# Patient Record
Sex: FEMALE | Race: WHITE | NOT HISPANIC OR LATINO | Employment: OTHER | URBAN - METROPOLITAN AREA
[De-identification: names, ages, dates, MRNs, and addresses within clinical notes are randomized per-mention and may not be internally consistent; named-entity substitution may affect disease eponyms.]

---

## 2017-03-24 ENCOUNTER — TELEPHONE (OUTPATIENT)
Dept: INTERNAL MEDICINE | Facility: CLINIC | Age: 67
End: 2017-03-24

## 2017-03-24 NOTE — TELEPHONE ENCOUNTER
----- Message from Caryn Dupree sent at 3/24/2017 10:12 AM EDT -----  Contact: pt - Dr Ramirez's pt - RE: lab test  Pt calling and would like a fecal test sent to pt. Pt informs would like to have fecal tested and have results available for appt in April. Could you please put order in pt's chart? Please advise. Thanks      Pt # [phone]

## 2017-04-17 ENCOUNTER — TELEPHONE (OUTPATIENT)
Dept: INTERNAL MEDICINE | Facility: CLINIC | Age: 67
End: 2017-04-17

## 2017-04-17 DIAGNOSIS — Z00.00 HEALTH CARE MAINTENANCE: Primary | ICD-10-CM

## 2017-04-17 NOTE — TELEPHONE ENCOUNTER
----- Message from Pennie Hartman sent at 4/17/2017  9:02 AM EDT -----  Contact: Patient  Patient called requesting lab appt prior to appt with Dr. Ramirez next week.  I have her scheduled for Thursday.  Need orders in chart please.

## 2017-04-20 ENCOUNTER — LAB (OUTPATIENT)
Dept: INTERNAL MEDICINE | Facility: CLINIC | Age: 67
End: 2017-04-20

## 2017-04-20 DIAGNOSIS — Z00.00 HEALTH CARE MAINTENANCE: ICD-10-CM

## 2017-04-20 LAB
ALBUMIN SERPL-MCNC: 4.32 G/DL (ref 3.4–4.6)
ALBUMIN/GLOB SERPL: 1.5 G/DL
ALP SERPL-CCNC: 47 U/L (ref 46–116)
ALT SERPL W P-5'-P-CCNC: 26 U/L (ref 14–59)
ANION GAP SERPL CALCULATED.3IONS-SCNC: 9 MMOL/L
AST SERPL-CCNC: 24 U/L (ref 7–37)
BASOPHILS # BLD AUTO: 0.06 10*3/MM3 (ref 0–0.2)
BASOPHILS NFR BLD AUTO: 0.9 % (ref 0–2)
BILIRUB SERPL-MCNC: 0.6 MG/DL (ref 0.2–1)
BILIRUB UR QL STRIP: NEGATIVE
BUN BLD-MCNC: 10 MG/DL (ref 6–22)
BUN/CREAT SERPL: 11.8 (ref 7–25)
CALCIUM SPEC-SCNC: 9.5 MG/DL (ref 8.6–10.5)
CHLORIDE SERPL-SCNC: 101 MMOL/L (ref 95–107)
CHOLEST SERPL-MCNC: 205 MG/DL (ref 0–200)
CLARITY UR: CLEAR
CO2 SERPL-SCNC: 27 MMOL/L (ref 23–32)
COLOR UR: YELLOW
CREAT BLD-MCNC: 0.85 MG/DL (ref 0.55–1.02)
DEPRECATED RDW RBC AUTO: 43 FL (ref 37–54)
EOSINOPHIL # BLD AUTO: 0.09 10*3/MM3 (ref 0–0.7)
EOSINOPHIL NFR BLD AUTO: 1.4 % (ref 0–5)
ERYTHROCYTE [DISTWIDTH] IN BLOOD BY AUTOMATED COUNT: 12.8 % (ref 11.5–15)
GFR SERPL CREATININE-BSD FRML MDRD: 67 ML/MIN/1.73
GLOBULIN UR ELPH-MCNC: 2.9 GM/DL
GLUCOSE BLD-MCNC: 85 MG/DL (ref 70–100)
GLUCOSE UR STRIP-MCNC: NEGATIVE MG/DL
HCT VFR BLD AUTO: 43.8 % (ref 34.1–44.9)
HDLC SERPL-MCNC: 82 MG/DL (ref 40–81)
HGB BLD-MCNC: 14.3 G/DL (ref 11.2–15.7)
HGB UR QL STRIP.AUTO: NEGATIVE
KETONES UR QL STRIP: ABNORMAL
LDLC SERPL CALC-MCNC: 114 MG/DL (ref 0–100)
LDLC/HDLC SERPL: 1.4 {RATIO}
LEUKOCYTE ESTERASE UR QL STRIP.AUTO: NEGATIVE
LYMPHOCYTES # BLD AUTO: 1.8 10*3/MM3 (ref 0.8–7)
LYMPHOCYTES NFR BLD AUTO: 27.5 % (ref 10–60)
MCH RBC QN AUTO: 30.4 PG (ref 26–34)
MCHC RBC AUTO-ENTMCNC: 32.6 G/DL (ref 31–37)
MCV RBC AUTO: 93.2 FL (ref 80–100)
MONOCYTES # BLD AUTO: 0.51 10*3/MM3 (ref 0–1)
MONOCYTES NFR BLD AUTO: 7.8 % (ref 0–13)
NEUTROPHILS # BLD AUTO: 4.08 10*3/MM3 (ref 1–11)
NEUTROPHILS NFR BLD AUTO: 62.4 % (ref 30–85)
NITRITE UR QL STRIP: NEGATIVE
PH UR STRIP.AUTO: 5.5 [PH] (ref 5–8)
PLATELET # BLD AUTO: 252 10*3/MM3 (ref 150–450)
PMV BLD AUTO: 10.6 FL (ref 6–12)
POTASSIUM BLD-SCNC: 4.6 MMOL/L (ref 3.3–5.3)
PROT SERPL-MCNC: 7.2 G/DL (ref 6.3–8.4)
PROT UR QL STRIP: NEGATIVE
RBC # BLD AUTO: 4.7 10*6/MM3 (ref 3.93–5.22)
SODIUM BLD-SCNC: 137 MMOL/L (ref 136–145)
SP GR UR STRIP: 1.01 (ref 1–1.03)
TRIGL SERPL-MCNC: 43 MG/DL (ref 0–150)
TSH SERPL DL<=0.05 MIU/L-ACNC: 0.97 MIU/ML (ref 0.4–4.2)
UROBILINOGEN UR QL STRIP: ABNORMAL
VLDLC SERPL-MCNC: 8.6 MG/DL
WBC NRBC COR # BLD: 6.54 10*3/MM3 (ref 5–10)

## 2017-04-20 PROCEDURE — 36415 COLL VENOUS BLD VENIPUNCTURE: CPT | Performed by: INTERNAL MEDICINE

## 2017-04-20 PROCEDURE — 81003 URINALYSIS AUTO W/O SCOPE: CPT | Performed by: INTERNAL MEDICINE

## 2017-04-20 PROCEDURE — 80050 GENERAL HEALTH PANEL: CPT | Performed by: INTERNAL MEDICINE

## 2017-04-20 PROCEDURE — 80061 LIPID PANEL: CPT | Performed by: INTERNAL MEDICINE

## 2017-04-25 ENCOUNTER — OFFICE VISIT (OUTPATIENT)
Dept: INTERNAL MEDICINE | Facility: CLINIC | Age: 67
End: 2017-04-25

## 2017-04-25 VITALS
DIASTOLIC BLOOD PRESSURE: 68 MMHG | SYSTOLIC BLOOD PRESSURE: 112 MMHG | BODY MASS INDEX: 28.17 KG/M2 | WEIGHT: 159 LBS | HEIGHT: 63 IN

## 2017-04-25 DIAGNOSIS — Z78.0 POST-MENOPAUSE: ICD-10-CM

## 2017-04-25 DIAGNOSIS — J30.1 SEASONAL ALLERGIC RHINITIS DUE TO POLLEN: ICD-10-CM

## 2017-04-25 DIAGNOSIS — Z00.00 HEALTH CARE MAINTENANCE: Primary | ICD-10-CM

## 2017-04-25 DIAGNOSIS — K64.4 EXTERNAL HEMORRHOID: ICD-10-CM

## 2017-04-25 PROCEDURE — 99397 PER PM REEVAL EST PAT 65+ YR: CPT | Performed by: INTERNAL MEDICINE

## 2017-04-25 PROCEDURE — 99213 OFFICE O/P EST LOW 20 MIN: CPT | Performed by: INTERNAL MEDICINE

## 2017-04-25 PROCEDURE — G0439 PPPS, SUBSEQ VISIT: HCPCS | Performed by: INTERNAL MEDICINE

## 2017-04-25 RX ORDER — FLUTICASONE PROPIONATE 50 MCG
2 SPRAY, SUSPENSION (ML) NASAL DAILY
Qty: 1 EACH | Refills: 11 | Status: SHIPPED | OUTPATIENT
Start: 2017-04-25 | End: 2018-06-09 | Stop reason: SDUPTHER

## 2017-04-25 NOTE — PATIENT INSTRUCTIONS
Annual wellness visit with preventive exam as well as age and risk appropriate councelling completed.    Cardiovascular disease councelling: current.  Diabetes screening and councelling: current.  Breast cancer screening: up to date. Recommended every year..  Osteoporosis screening: is due, will schedule. Benefits explained..  Glaucoma screening: up to date.   AAA screening: not needed..  Hep C screening (born between [birth year]) completed..  Colorectal cancer screening: up to date. Recommended every 10 years. Next screening is due in 2024..    Immunizations:   1. Influenza vaccine discussed and recommended, but patient declines.   2. Pneumococcal vaccines: completed.   3. Zostavax: completed.      Advanced directives planning: has Living Will. Discussed. I agree with patient wishes and decision..    Medications reviewed and medication list updated.   Potential harmful drug-disease interactions in the elderly:none.  High risk medication in elderly: none  ASA use: no indications.    Seasonal allergic sinusitis - continue Zyrtec and add nasal spray.  Hemorrhoids external - reassured. Use Preparation H as needed OTC.

## 2017-04-25 NOTE — PROGRESS NOTES
"Subjective   Verito العراقي is a 67 y.o. female. Here for AWV    History of Present Illness   /68  Ht 63\" (160 cm)  Wt 159 lb (72.1 kg)  BMI 28.17 kg/m2  Patient is being seen for subsequent wellness visit.  Hospitalizations in a past: only for childbirth  Once per lifetime Medicare screening tests: ECG - 02/12/2016    AAA risk assessment: 1. FH: none. 2.H/o tobacco smoking: none. 3. CV or PAD: none.    Tobacco use: none   Alcohol use: occasionally  Illicit drugs use: none  Diet: healthy heart  Exercise: 5-6 days a week and walking    Anxiety screening: How many days in the last 2 weeks you were  1. Feeling anxious, nervous, on edge: none  2. Unable to stop worrying: none  3. Worrying too much about different things: none  4. Having problems relaxing:none  5. Feeling restless or unable to sit still:none  6. Feeling irritable or easely annoyed: none  7. Being afraid that something awful might happen: none    Depression screening PHQ9:  Over the past 2 weeks how often have you been bothered by  1. Lack of interest or pleasuer in usual activities: none  2. Feeling down, depressed, hopeless:none  3. Troubles falling asleep/sleeping too long:none  4. Feeling tired, having little energy: none  5. Poor appetite or overeating: none  6. Feeling bad about yourself:none.  7. Trouble concentrating: at the baseline.  8. Moving or speaking too slow or much faster than usual: none  9. Thoughts about harming yourself:none.    Cognitive impairment screening:   Do you have difficulties with:  1. Language: no  2. Behavior: no  3. Learning/retaining new information: no  4. Handling complex tasks: no  5. Reasoning: no  6. Spacial ability and orientation: no    Hearing: normal.  Driving: unrestricted  ADL: independent  ADL details: Does patient needs help with:  telephone use: no  Transportation: no  Housework: no  Shopping: no  meal preparation: no  laundry: no  managing medication:no  Participate in the social activities: " "Y    Fall risk factors:   1.Use of alcohol: yes    2.Polypharmacy: no  3.H/o of previous fall:  no  4. Mobility impairment:  no  5. Visual impairment:  no  6. Deconditioning: no  7. Use of antihypertensive medication:  no  8. Use of sedatives: no  9. Use of antidepressant: no    Home safety:   1.loose rugs: no   2.unfamiliar environment: no   3. Uneven floors: no   4. No grab bars in the bathroom:  Yes. Recommended to update the bathroom and to instal the grab bar.  5. No banister on stairs: no      Advanced directives: has Living Will. Discussed. I agree with patient wishes and decision..    Co-managers: ophthalmology , GYN , dermatologist , GI .   Patient c/o seasonal allergies: runny nose usually in the spring. It had been bothering her some now. Not well controlled with Zyrtec OTC. Run out of the refills for hr Flonase and had not been using any nasal sprays lately.   Patient is concerned that she has some rectal \"protrusion\". Has h/o hemorrhoids post-partum , but had no issues since. She denies any pain, itching or blood in the stool or on the toilet paper. No constipation. Had been walking more recently (6 miles today), also does some yoga and plays golf.She had approached her GYN with this question, but had not been satisfied with her reassurance.    The following portions of the patient's history were reviewed and updated as appropriate: allergies, current medications, past family history, past medical history, past social history, past surgical history and problem list.    Review of Systems   Constitutional: Negative for chills and fever.   HENT: Negative for postnasal drip, sinus pressure and sore throat.    Eyes: Negative for pain and itching.   Respiratory: Negative for cough and chest tightness.    Cardiovascular: Negative for chest pain and leg swelling.   Gastrointestinal: Negative for abdominal pain and blood in stool.   Endocrine: Negative for cold intolerance and heat " intolerance.   Genitourinary: Negative for difficulty urinating and flank pain.   Musculoskeletal: Negative for back pain and neck pain.   Skin: Negative for color change and rash.   Allergic/Immunologic: Positive for environmental allergies.   Neurological: Negative for dizziness, weakness and headaches.   Hematological: Negative for adenopathy. Does not bruise/bleed easily.   Psychiatric/Behavioral: Negative for sleep disturbance. The patient is not nervous/anxious.        Objective   Physical Exam   Constitutional: She is oriented to person, place, and time. Vital signs are normal. She appears well-developed and well-nourished. No distress.   HENT:   Head: Normocephalic and atraumatic.   Right Ear: External ear normal.   Left Ear: External ear normal.   Nose: Nose normal. No mucosal edema. Right sinus exhibits no maxillary sinus tenderness and no frontal sinus tenderness. Left sinus exhibits no maxillary sinus tenderness and no frontal sinus tenderness.   Mouth/Throat: Oropharynx is clear and moist. No oropharyngeal exudate.   Eyes: Conjunctivae, EOM and lids are normal. Pupils are equal, round, and reactive to light. Right eye exhibits no discharge. Left eye exhibits no discharge. Right conjunctiva is not injected. Left conjunctiva is not injected. No scleral icterus. Right eye exhibits normal extraocular motion. Left eye exhibits normal extraocular motion.   Neck: Normal range of motion and full passive range of motion without pain. Neck supple. No JVD present. Carotid bruit is not present. No thyromegaly present.   Cardiovascular: Normal rate, regular rhythm, S1 normal, S2 normal, normal heart sounds and intact distal pulses.  PMI is not displaced.  Exam reveals no gallop and no friction rub.    No murmur heard.  Pulmonary/Chest: Effort normal and breath sounds normal. No accessory muscle usage. No respiratory distress. She has no decreased breath sounds. She has no wheezes. She has no rhonchi. She has no  rales.   Abdominal: Soft. Bowel sounds are normal. She exhibits no distension, no pulsatile liver, no fluid wave, no abdominal bruit, no ascites and no mass. There is no tenderness. There is no rebound and no guarding.   Genitourinary: Rectal exam shows external hemorrhoid. Rectal exam shows no fissure, no mass, no tenderness and anal tone normal.   Musculoskeletal: She exhibits no edema or deformity.   Lymphadenopathy:        Head (right side): No submental, no submandibular, no preauricular, no posterior auricular and no occipital adenopathy present.        Head (left side): No submental, no submandibular, no tonsillar, no preauricular, no posterior auricular and no occipital adenopathy present.     She has no cervical adenopathy.        Right cervical: No superficial cervical, no deep cervical and no posterior cervical adenopathy present.       Left cervical: No superficial cervical, no deep cervical and no posterior cervical adenopathy present.        Right: No supraclavicular adenopathy present.        Left: No supraclavicular adenopathy present.   Neurological: She is alert and oriented to person, place, and time. She has normal strength and normal reflexes. She displays normal reflexes. No cranial nerve deficit. She exhibits normal muscle tone. Coordination normal.   Reflex Scores:       Bicep reflexes are 2+ on the right side and 2+ on the left side.       Patellar reflexes are 2+ on the right side and 2+ on the left side.  Skin: Skin is warm and dry. No rash noted. She is not diaphoretic. No erythema.   tanned   Psychiatric: She has a normal mood and affect. Her speech is normal and behavior is normal. Thought content normal.   Vitals reviewed.      Assessment/Plan   Verito was seen today for annual exam.    Diagnoses and all orders for this visit:    Health care maintenance    Seasonal allergic rhinitis due to pollen  -     fluticasone (FLONASE) 50 MCG/ACT nasal spray; 2 sprays into each nostril  Daily.    Annual wellness visit with preventive exam as well as age and risk appropriate councelling completed.    Cardiovascular disease councelling: current.  Diabetes screening and councelling: current.  Breast cancer screening: up to date. Recommended every year..  Osteoporosis screening: is due, will schedule. Benefits explained..  Glaucoma screening: up to date.   AAA screening: not needed..  Hep C screening (born between [birth year]) completed..  Colorectal cancer screening: up to date. Recommended every 10 years. Next screening is due in 2024..    Immunizations:   1. Influenza vaccine discussed and recommended, but patient declines.   2. Pneumococcal vaccines: completed.   3. Zostavax: completed.      Advanced directives planning: has Living Will. Discussed. I agree with patient wishes and decision..    Medications reviewed and medication list updated.   Potential harmful drug-disease interactions in the elderly:none.  High risk medication in elderly: none  ASA use: no indications.    Seasonal allergic sinusitis - continue Zyrtec and add nasal spray.  Hemorrhoids external - reassured. Use Preparation H as needed OTC.

## 2017-06-05 ENCOUNTER — CLINICAL SUPPORT (OUTPATIENT)
Dept: INTERNAL MEDICINE | Facility: CLINIC | Age: 67
End: 2017-06-05

## 2017-06-05 DIAGNOSIS — Z78.0 POST-MENOPAUSE: ICD-10-CM

## 2017-06-05 PROCEDURE — 77080 DXA BONE DENSITY AXIAL: CPT | Performed by: INTERNAL MEDICINE

## 2017-10-31 ENCOUNTER — AMBULATORY SURGICAL CENTER (AMBULATORY)
Dept: URBAN - METROPOLITAN AREA SURGERY 17 | Facility: SURGERY | Age: 67
End: 2017-10-31

## 2017-10-31 ENCOUNTER — OFFICE (AMBULATORY)
Dept: URBAN - METROPOLITAN AREA CLINIC 64 | Facility: CLINIC | Age: 67
End: 2017-10-31
Payer: MEDICARE

## 2017-10-31 VITALS
OXYGEN SATURATION: 99 % | RESPIRATION RATE: 16 BRPM | OXYGEN SATURATION: 95 % | DIASTOLIC BLOOD PRESSURE: 70 MMHG | TEMPERATURE: 98.2 F | SYSTOLIC BLOOD PRESSURE: 130 MMHG | HEART RATE: 65 BPM | DIASTOLIC BLOOD PRESSURE: 76 MMHG | SYSTOLIC BLOOD PRESSURE: 132 MMHG | HEART RATE: 69 BPM | DIASTOLIC BLOOD PRESSURE: 81 MMHG | OXYGEN SATURATION: 98 % | SYSTOLIC BLOOD PRESSURE: 121 MMHG | RESPIRATION RATE: 17 BRPM | RESPIRATION RATE: 21 BRPM | HEIGHT: 62 IN | HEART RATE: 79 BPM | OXYGEN SATURATION: 94 % | WEIGHT: 160 LBS | SYSTOLIC BLOOD PRESSURE: 131 MMHG | DIASTOLIC BLOOD PRESSURE: 66 MMHG | HEART RATE: 73 BPM | TEMPERATURE: 98 F | DIASTOLIC BLOOD PRESSURE: 71 MMHG | SYSTOLIC BLOOD PRESSURE: 117 MMHG | OXYGEN SATURATION: 96 % | DIASTOLIC BLOOD PRESSURE: 88 MMHG | HEART RATE: 68 BPM | HEART RATE: 80 BPM | SYSTOLIC BLOOD PRESSURE: 118 MMHG | SYSTOLIC BLOOD PRESSURE: 119 MMHG | HEART RATE: 75 BPM | DIASTOLIC BLOOD PRESSURE: 79 MMHG | OXYGEN SATURATION: 100 % | RESPIRATION RATE: 14 BRPM | RESPIRATION RATE: 18 BRPM | RESPIRATION RATE: 15 BRPM | HEART RATE: 70 BPM

## 2017-10-31 DIAGNOSIS — D12.2 BENIGN NEOPLASM OF ASCENDING COLON: ICD-10-CM

## 2017-10-31 DIAGNOSIS — K64.8 OTHER HEMORRHOIDS: ICD-10-CM

## 2017-10-31 DIAGNOSIS — K59.1 FUNCTIONAL DIARRHEA: ICD-10-CM

## 2017-10-31 LAB
GI HISTOLOGY: A. UNSPECIFIED: (no result)
GI HISTOLOGY: B. SELECT: (no result)
GI HISTOLOGY: C. UNSPECIFIED: (no result)
GI HISTOLOGY: D. SELECT: (no result)
GI HISTOLOGY: PDF REPORT: (no result)

## 2017-10-31 PROCEDURE — 88305 TISSUE EXAM BY PATHOLOGIST: CPT | Performed by: INTERNAL MEDICINE

## 2017-10-31 PROCEDURE — 45380 COLONOSCOPY AND BIOPSY: CPT | Mod: 59 | Performed by: INTERNAL MEDICINE

## 2017-10-31 PROCEDURE — 45385 COLONOSCOPY W/LESION REMOVAL: CPT | Performed by: INTERNAL MEDICINE

## 2017-10-31 RX ADMIN — PROPOFOL 100 MG: 10 INJECTION, EMULSION INTRAVENOUS at 13:36

## 2017-10-31 RX ADMIN — PROPOFOL 50 MG: 10 INJECTION, EMULSION INTRAVENOUS at 13:40

## 2017-10-31 RX ADMIN — PROPOFOL 100 MG: 10 INJECTION, EMULSION INTRAVENOUS at 13:34

## 2017-10-31 RX ADMIN — PROPOFOL 50 MG: 10 INJECTION, EMULSION INTRAVENOUS at 13:49

## 2017-10-31 NOTE — SERVICEHPINOTES
She has had diarrhea now for over 2 months. No obvious cause. No bleeding, no nocturnal sx. Family hx. negative, no one else ill. Stool studies negative.

## 2017-11-09 PROBLEM — D12.6 TUBULAR ADENOMA OF COLON: Status: ACTIVE | Noted: 2017-11-09

## 2017-12-11 ENCOUNTER — APPOINTMENT (OUTPATIENT)
Dept: WOMENS IMAGING | Facility: HOSPITAL | Age: 67
End: 2017-12-11

## 2017-12-11 PROCEDURE — G0202 SCR MAMMO BI INCL CAD: HCPCS | Performed by: RADIOLOGY

## 2017-12-11 PROCEDURE — 77063 BREAST TOMOSYNTHESIS BI: CPT | Performed by: RADIOLOGY

## 2018-04-03 ENCOUNTER — TELEPHONE (OUTPATIENT)
Dept: INTERNAL MEDICINE | Facility: CLINIC | Age: 68
End: 2018-04-03

## 2018-04-03 NOTE — TELEPHONE ENCOUNTER
----- Message from Caryn Dupree sent at 4/3/2018 10:59 AM EDT -----  Contact: pt - Dr Ramirez's pt - RE: CPE appt  Pt calling and would like to have CPE before leaving the country. Pt had last CPE on 04/25/2017. Pt is leaving country on 05/14/2017. Pt informs can not do any appt during 04/30/2018 thru 05/04/2018, ok for 04/27/2018 and 05/11/2018 thru 05/14/2018. Could you please see if pt can be worked in during this time? Pt informed that there was nothing during this time but would put message back and see if Dr Ramirez could work pt in. Could you please call pt if appt could be made? Please advise. Thanks        Pt # [phone]

## 2018-04-24 ENCOUNTER — TELEPHONE (OUTPATIENT)
Dept: INTERNAL MEDICINE | Facility: CLINIC | Age: 68
End: 2018-04-24

## 2018-04-24 DIAGNOSIS — I34.1 MITRAL VALVE PROLAPSE: Primary | ICD-10-CM

## 2018-04-24 DIAGNOSIS — I51.89 DIASTOLIC DYSFUNCTION: ICD-10-CM

## 2018-04-24 DIAGNOSIS — Z00.00 HEALTH CARE MAINTENANCE: ICD-10-CM

## 2018-04-24 DIAGNOSIS — R53.83 OTHER FATIGUE: ICD-10-CM

## 2018-04-24 NOTE — TELEPHONE ENCOUNTER
----- Message from Pennie Hartman sent at 4/24/2018  1:58 PM EDT -----  Patient scheduled for labs, Friday 04/27/18.  Need orders please.  Thanks.

## 2018-04-27 ENCOUNTER — LAB (OUTPATIENT)
Dept: INTERNAL MEDICINE | Facility: CLINIC | Age: 68
End: 2018-04-27

## 2018-04-27 DIAGNOSIS — I34.1 MITRAL VALVE PROLAPSE: ICD-10-CM

## 2018-04-27 DIAGNOSIS — R53.83 OTHER FATIGUE: ICD-10-CM

## 2018-04-27 DIAGNOSIS — I51.89 DIASTOLIC DYSFUNCTION: ICD-10-CM

## 2018-04-27 LAB
ALBUMIN SERPL-MCNC: 4.8 G/DL (ref 3.5–5.2)
ALBUMIN/GLOB SERPL: 1.9 G/DL
ALP SERPL-CCNC: 54 U/L (ref 39–117)
ALT SERPL-CCNC: 14 U/L (ref 1–33)
AST SERPL-CCNC: 15 U/L (ref 1–32)
BASOPHILS # BLD AUTO: 0.07 10*3/MM3 (ref 0–0.2)
BASOPHILS NFR BLD AUTO: 1 % (ref 0–1.5)
BILIRUB SERPL-MCNC: 0.3 MG/DL (ref 0.1–1.2)
BILIRUB UR QL STRIP: NEGATIVE
BUN SERPL-MCNC: 16 MG/DL (ref 8–23)
BUN/CREAT SERPL: 17.6 (ref 7–25)
CALCIUM SERPL-MCNC: 9.7 MG/DL (ref 8.6–10.5)
CHLORIDE SERPL-SCNC: 100 MMOL/L (ref 98–107)
CHOLEST SERPL-MCNC: 237 MG/DL (ref 0–200)
CLARITY UR: CLEAR
CO2 SERPL-SCNC: 26.4 MMOL/L (ref 22–29)
COLOR UR: YELLOW
CREAT SERPL-MCNC: 0.91 MG/DL (ref 0.57–1)
EOSINOPHIL # BLD AUTO: 0.21 10*3/MM3 (ref 0–0.7)
EOSINOPHIL # BLD AUTO: 3.1 % (ref 0.3–6.2)
ERYTHROCYTE [DISTWIDTH] IN BLOOD BY AUTOMATED COUNT: 13.6 % (ref 11.7–13)
GLOBULIN SER CALC-MCNC: 2.5 GM/DL
GLUCOSE SERPL-MCNC: 103 MG/DL (ref 65–99)
GLUCOSE UR STRIP-MCNC: NEGATIVE MG/DL
HCT VFR BLD AUTO: 46.1 % (ref 35.6–45.5)
HDLC SERPL-MCNC: 80 MG/DL (ref 40–60)
HGB BLD-MCNC: 15 G/DL (ref 11.9–15.5)
HGB UR QL STRIP.AUTO: NEGATIVE
IMM GRANULOCYTES # BLD: 0.01 10*3/MM3 (ref 0–0.03)
IMM GRANULOCYTES NFR BLD: 0.1 % (ref 0–0.5)
KETONES UR QL STRIP: NEGATIVE
LDLC SERPL CALC-MCNC: 142 MG/DL (ref 0–100)
LEUKOCYTE ESTERASE UR QL STRIP.AUTO: NEGATIVE
LYMPHOCYTES # BLD AUTO: 3.14 10*3/MM3 (ref 0.9–4.8)
LYMPHOCYTES NFR BLD AUTO: 47 % (ref 19.6–45.3)
MCH RBC QN AUTO: 31.3 PG (ref 26.9–32)
MCHC RBC AUTO-ENTMCNC: 32.5 G/DL (ref 32.4–36.3)
MCV RBC AUTO: 96.2 FL (ref 80.5–98.2)
MONOCYTES # BLD AUTO: 0.73 10*3/MM3 (ref 0.2–1.2)
MONOCYTES NFR BLD AUTO: 10.9 % (ref 5–12)
NEUTROPHILS # BLD AUTO: 2.53 10*3/MM3 (ref 1.9–8.1)
NEUTROPHILS NFR BLD AUTO: 38 % (ref 42.7–76)
NITRITE UR QL STRIP: NEGATIVE
PH UR STRIP.AUTO: 7 [PH] (ref 5–8)
PLATELET # BLD AUTO: 249 10*3/MM3 (ref 140–500)
POTASSIUM SERPL-SCNC: 4.7 MMOL/L (ref 3.5–5.2)
PROT SERPL-MCNC: 7.3 G/DL (ref 6–8.5)
PROT UR QL STRIP: NEGATIVE
RBC # BLD AUTO: 4.79 10*6/MM3 (ref 3.9–5.2)
SODIUM SERPL-SCNC: 138 MMOL/L (ref 136–145)
SP GR UR STRIP: 1.01 (ref 1–1.03)
TRIGL SERPL-MCNC: 77 MG/DL (ref 0–150)
TSH SERPL-ACNC: 1.58 MIU/ML (ref 0.27–4.2)
UROBILINOGEN UR QL STRIP: NORMAL
VLDLC SERPL-MCNC: 15.4 MG/DL (ref 5–40)
WBC # BLD AUTO: 6.68 10*3/MM3 (ref 4.5–10.7)

## 2018-04-27 PROCEDURE — 81003 URINALYSIS AUTO W/O SCOPE: CPT | Performed by: INTERNAL MEDICINE

## 2018-04-30 ENCOUNTER — OFFICE VISIT (OUTPATIENT)
Dept: INTERNAL MEDICINE | Facility: CLINIC | Age: 68
End: 2018-04-30

## 2018-04-30 VITALS
HEIGHT: 63 IN | OXYGEN SATURATION: 96 % | BODY MASS INDEX: 28.7 KG/M2 | DIASTOLIC BLOOD PRESSURE: 76 MMHG | WEIGHT: 162 LBS | SYSTOLIC BLOOD PRESSURE: 116 MMHG | HEART RATE: 83 BPM

## 2018-04-30 DIAGNOSIS — E78.00 PURE HYPERCHOLESTEROLEMIA: ICD-10-CM

## 2018-04-30 DIAGNOSIS — Z00.00 HEALTH CARE MAINTENANCE: Primary | ICD-10-CM

## 2018-04-30 DIAGNOSIS — R73.01 IMPAIRED FASTING BLOOD SUGAR: ICD-10-CM

## 2018-04-30 DIAGNOSIS — F41.9 ANXIETY: ICD-10-CM

## 2018-04-30 PROBLEM — E66.3 OVERWEIGHT (BMI 25.0-29.9): Status: ACTIVE | Noted: 2018-04-30

## 2018-04-30 PROCEDURE — 99213 OFFICE O/P EST LOW 20 MIN: CPT | Performed by: INTERNAL MEDICINE

## 2018-04-30 PROCEDURE — G0439 PPPS, SUBSEQ VISIT: HCPCS | Performed by: INTERNAL MEDICINE

## 2018-04-30 RX ORDER — ESCITALOPRAM OXALATE 10 MG/1
10 TABLET ORAL DAILY
Qty: 30 TABLET | Refills: 11 | Status: SHIPPED | OUTPATIENT
Start: 2018-04-30 | End: 2018-08-29

## 2018-06-09 DIAGNOSIS — J30.1 SEASONAL ALLERGIC RHINITIS DUE TO POLLEN: ICD-10-CM

## 2018-06-11 RX ORDER — FLUTICASONE PROPIONATE 50 MCG
SPRAY, SUSPENSION (ML) NASAL
Qty: 16 ML | Refills: 10 | Status: SHIPPED | OUTPATIENT
Start: 2018-06-11 | End: 2019-06-19 | Stop reason: SDUPTHER

## 2018-08-29 ENCOUNTER — OFFICE VISIT (OUTPATIENT)
Dept: INTERNAL MEDICINE | Facility: CLINIC | Age: 68
End: 2018-08-29

## 2018-08-29 VITALS
DIASTOLIC BLOOD PRESSURE: 72 MMHG | HEIGHT: 63 IN | BODY MASS INDEX: 28.88 KG/M2 | SYSTOLIC BLOOD PRESSURE: 112 MMHG | RESPIRATION RATE: 14 BRPM | WEIGHT: 163 LBS

## 2018-08-29 DIAGNOSIS — F41.8 ANXIETY ASSOCIATED WITH DEPRESSION: Primary | ICD-10-CM

## 2018-08-29 DIAGNOSIS — E78.00 PURE HYPERCHOLESTEROLEMIA: ICD-10-CM

## 2018-08-29 PROCEDURE — 99213 OFFICE O/P EST LOW 20 MIN: CPT | Performed by: INTERNAL MEDICINE

## 2018-08-29 RX ORDER — DESVENLAFAXINE SUCCINATE 50 MG/1
50 TABLET, EXTENDED RELEASE ORAL DAILY
Qty: 30 TABLET | Refills: 11 | Status: SHIPPED | OUTPATIENT
Start: 2018-08-29 | End: 2019-03-29 | Stop reason: SDUPTHER

## 2018-08-29 NOTE — PROGRESS NOTES
"Subjective   Verito العراقي is a 68 y.o. female.     History of Present Illness   Verito العراقي 68 y.o. female presents today for anxiety d/o f/u. Last was seen on 04/30/2018 and on that visit medication was changed due to inadequate control.We had started Pristiq 50 mg.  Patient is compliant with treatment and denies  side effects. Patient states that the mood had much improved with the change in medical treatment. Patient reports that the level of anxiety is much less. Had been much calmer.    /72 (BP Location: Left arm, Patient Position: Sitting, Cuff Size: Adult)   Resp 14   Ht 160 cm (63\")   Wt 73.9 kg (163 lb)   BMI 28.87 kg/m²     Current Outpatient Prescriptions:   •  Calcium Carbonate-Vitamin D (CALCIUM + D PO), Take 1 tablet by mouth daily., Disp: , Rfl:   •  calcium carbonate-vitamin d 600-400 MG-UNIT per tablet, Take 1 tablet by mouth daily as needed., Disp: , Rfl:   •  Cetirizine HCl (ZYRTEC PO), Take 1 tablet by mouth daily., Disp: , Rfl:   •  Flaxseed, Linseed, (FLAX SEED OIL) 1300 MG capsule, Take by mouth., Disp: , Rfl:   •  fluticasone (FLONASE) 50 MCG/ACT nasal spray, PLACE TWO SPRAYS IN EACH NOSTRIL DAILY, Disp: 16 mL, Rfl: 10  •  glucosamine-chondroitin 500-400 MG capsule capsule, Take 2 capsules by mouth daily., Disp: , Rfl:   •  Multiple Vitamins tablet, Take by mouth., Disp: , Rfl:   •  vitamin C (ASCORBIC ACID) 500 MG tablet, Take 1 tablet by mouth daily., Disp: , Rfl:   •  vitamin E 400 UNIT capsule, Take 1 capsule by mouth daily., Disp: , Rfl:   •  desvenlafaxine (PRISTIQ) 50 MG 24 hr tablet, Take 1 tablet by mouth Daily., Disp: 30 tablet, Rfl: 11  The following portions of the patient's history were reviewed and updated as appropriate: allergies, current medications, past family history, past medical history, past social history, past surgical history and problem list.    Review of Systems   Constitutional: Negative for chills and fever.   Eyes: Negative for pain and " redness.   Respiratory: Negative for cough and shortness of breath.    Cardiovascular: Negative for chest pain and leg swelling.   Neurological: Negative for dizziness and headaches.       Objective   Physical Exam   Constitutional: She is oriented to person, place, and time. She appears well-developed and well-nourished.   HENT:   Head: Normocephalic and atraumatic.   Right Ear: Tympanic membrane, external ear and ear canal normal.   Left Ear: Tympanic membrane, external ear and ear canal normal.   Nose: Nose normal. Right sinus exhibits no maxillary sinus tenderness and no frontal sinus tenderness. Left sinus exhibits no maxillary sinus tenderness and no frontal sinus tenderness.   Mouth/Throat: Uvula is midline, oropharynx is clear and moist and mucous membranes are normal.   Eyes: Pupils are equal, round, and reactive to light. Conjunctivae and EOM are normal. Right eye exhibits no discharge. Left eye exhibits no discharge. No scleral icterus.   Neck: Neck supple. No JVD present.   Cardiovascular: Normal rate, regular rhythm and normal heart sounds.  Exam reveals no gallop and no friction rub.    No murmur heard.  Pulmonary/Chest: Effort normal and breath sounds normal. She has no wheezes. She has no rales.   Musculoskeletal: She exhibits no edema.   Lymphadenopathy:     She has no cervical adenopathy.   Neurological: She is alert and oriented to person, place, and time. No cranial nerve deficit.   Skin: Skin is warm and dry. No rash noted.   tanned   Psychiatric: She has a normal mood and affect. Her behavior is normal.   Vitals reviewed.      Assessment/Plan   Vertio was seen today for anxiety.    Diagnoses and all orders for this visit:    Anxiety associated with depression  -     desvenlafaxine (PRISTIQ) 50 MG 24 hr tablet; Take 1 tablet by mouth Daily.    Pure hypercholesterolemia  -     CBC & Differential; Future  -     Comprehensive Metabolic Panel; Future  -     Lipid Panel; Future  -     TSH;  Future      Anxiety/depression - much improved with medication, continue same. Regular exercise recommended.

## 2018-12-12 ENCOUNTER — APPOINTMENT (OUTPATIENT)
Dept: WOMENS IMAGING | Facility: HOSPITAL | Age: 68
End: 2018-12-12

## 2018-12-12 PROCEDURE — 77067 SCR MAMMO BI INCL CAD: CPT | Performed by: RADIOLOGY

## 2018-12-12 PROCEDURE — 77063 BREAST TOMOSYNTHESIS BI: CPT | Performed by: RADIOLOGY

## 2019-03-29 DIAGNOSIS — F41.8 ANXIETY ASSOCIATED WITH DEPRESSION: ICD-10-CM

## 2019-03-29 RX ORDER — DESVENLAFAXINE SUCCINATE 50 MG/1
TABLET, EXTENDED RELEASE ORAL
Qty: 30 TABLET | Refills: 4 | Status: SHIPPED | OUTPATIENT
Start: 2019-03-29 | End: 2019-07-24 | Stop reason: SDUPTHER

## 2019-06-11 ENCOUNTER — LAB (OUTPATIENT)
Dept: INTERNAL MEDICINE | Facility: CLINIC | Age: 69
End: 2019-06-11

## 2019-06-11 DIAGNOSIS — E78.00 PURE HYPERCHOLESTEROLEMIA: ICD-10-CM

## 2019-06-11 LAB
ALBUMIN SERPL-MCNC: 4.6 G/DL (ref 3.5–5.2)
ALBUMIN/GLOB SERPL: 1.8 G/DL
ALP SERPL-CCNC: 57 U/L (ref 39–117)
ALT SERPL-CCNC: 19 U/L (ref 1–33)
AST SERPL-CCNC: 20 U/L (ref 1–32)
BASOPHILS # BLD AUTO: 0.07 10*3/MM3 (ref 0–0.2)
BASOPHILS NFR BLD AUTO: 1.4 % (ref 0–1.5)
BILIRUB SERPL-MCNC: 0.5 MG/DL (ref 0.2–1.2)
BUN SERPL-MCNC: 12 MG/DL (ref 8–23)
BUN/CREAT SERPL: 14.6 (ref 7–25)
CALCIUM SERPL-MCNC: 9.5 MG/DL (ref 8.6–10.5)
CHLORIDE SERPL-SCNC: 101 MMOL/L (ref 98–107)
CHOLEST SERPL-MCNC: 243 MG/DL (ref 0–200)
CO2 SERPL-SCNC: 23.5 MMOL/L (ref 22–29)
CREAT SERPL-MCNC: 0.82 MG/DL (ref 0.57–1)
DEPRECATED RDW RBC AUTO: 45.1 FL (ref 37–54)
EOSINOPHIL # BLD AUTO: 0.18 10*3/MM3 (ref 0–0.4)
EOSINOPHIL NFR BLD AUTO: 3.5 % (ref 0.3–6.2)
ERYTHROCYTE [DISTWIDTH] IN BLOOD BY AUTOMATED COUNT: 13.5 % (ref 12.3–15.4)
GLOBULIN SER CALC-MCNC: 2.5 GM/DL
GLUCOSE SERPL-MCNC: 115 MG/DL (ref 65–99)
HCT VFR BLD AUTO: 45.4 % (ref 34–46.6)
HDLC SERPL-MCNC: 70 MG/DL (ref 40–60)
HGB BLD-MCNC: 14.6 G/DL (ref 12–15.9)
LDLC SERPL CALC-MCNC: 153 MG/DL (ref 0–100)
LYMPHOCYTES # BLD AUTO: 1.86 10*3/MM3 (ref 0.7–3.1)
LYMPHOCYTES NFR BLD AUTO: 36.3 % (ref 19.6–45.3)
MCH RBC QN AUTO: 30 PG (ref 26.6–33)
MCHC RBC AUTO-ENTMCNC: 32.2 G/DL (ref 31.5–35.7)
MCV RBC AUTO: 93.4 FL (ref 79–97)
MONOCYTES # BLD AUTO: 0.54 10*3/MM3 (ref 0.1–0.9)
MONOCYTES NFR BLD AUTO: 10.5 % (ref 5–12)
NEUTROPHILS # BLD AUTO: 2.47 10*3/MM3 (ref 1.7–7)
NEUTROPHILS NFR BLD AUTO: 48.3 % (ref 42.7–76)
PLATELET # BLD AUTO: 245 10*3/MM3 (ref 140–450)
PMV BLD AUTO: 9.8 FL (ref 6–12)
POTASSIUM SERPL-SCNC: 4.5 MMOL/L (ref 3.5–5.2)
PROT SERPL-MCNC: 7.1 G/DL (ref 6–8.5)
RBC # BLD AUTO: 4.86 10*6/MM3 (ref 3.77–5.28)
SODIUM SERPL-SCNC: 138 MMOL/L (ref 136–145)
TRIGL SERPL-MCNC: 102 MG/DL (ref 0–150)
VLDLC SERPL-MCNC: 20.4 MG/DL
WBC NRBC COR # BLD: 5.12 10*3/MM3 (ref 3.4–10.8)

## 2019-06-11 PROCEDURE — 36415 COLL VENOUS BLD VENIPUNCTURE: CPT | Performed by: INTERNAL MEDICINE

## 2019-06-11 PROCEDURE — 85025 COMPLETE CBC W/AUTO DIFF WBC: CPT | Performed by: INTERNAL MEDICINE

## 2019-06-12 LAB — TSH SERPL-ACNC: 1.71 MIU/ML (ref 0.27–4.2)

## 2019-06-19 DIAGNOSIS — J30.1 SEASONAL ALLERGIC RHINITIS DUE TO POLLEN: ICD-10-CM

## 2019-06-19 RX ORDER — FLUTICASONE PROPIONATE 50 MCG
SPRAY, SUSPENSION (ML) NASAL
Qty: 16 ML | Refills: 9 | Status: SHIPPED | OUTPATIENT
Start: 2019-06-19 | End: 2020-09-25 | Stop reason: SDUPTHER

## 2019-07-24 ENCOUNTER — OFFICE VISIT (OUTPATIENT)
Dept: INTERNAL MEDICINE | Facility: CLINIC | Age: 69
End: 2019-07-24

## 2019-07-24 VITALS
BODY MASS INDEX: 30.12 KG/M2 | SYSTOLIC BLOOD PRESSURE: 128 MMHG | HEIGHT: 63 IN | RESPIRATION RATE: 14 BRPM | WEIGHT: 170 LBS | DIASTOLIC BLOOD PRESSURE: 78 MMHG

## 2019-07-24 DIAGNOSIS — E78.00 PURE HYPERCHOLESTEROLEMIA: ICD-10-CM

## 2019-07-24 DIAGNOSIS — R73.01 IMPAIRED FASTING BLOOD SUGAR: ICD-10-CM

## 2019-07-24 DIAGNOSIS — Z12.31 VISIT FOR SCREENING MAMMOGRAM: Primary | ICD-10-CM

## 2019-07-24 DIAGNOSIS — F41.8 ANXIETY ASSOCIATED WITH DEPRESSION: ICD-10-CM

## 2019-07-24 PROCEDURE — 99397 PER PM REEVAL EST PAT 65+ YR: CPT | Performed by: INTERNAL MEDICINE

## 2019-07-24 PROCEDURE — G0439 PPPS, SUBSEQ VISIT: HCPCS | Performed by: INTERNAL MEDICINE

## 2019-07-24 RX ORDER — DESVENLAFAXINE SUCCINATE 50 MG/1
50 TABLET, EXTENDED RELEASE ORAL DAILY
Qty: 90 TABLET | Refills: 3 | Status: SHIPPED | OUTPATIENT
Start: 2019-07-24 | End: 2020-08-24 | Stop reason: SDUPTHER

## 2019-07-24 NOTE — PROGRESS NOTES
"Subjective   Verito العراقي is a 69 y.o. female.     History of Present Illness   /78 (BP Location: Left arm, Patient Position: Sitting, Cuff Size: Adult)   Resp 14   Ht 160 cm (63\")   Wt 77.1 kg (170 lb)   BMI 30.11 kg/m²   Patient is being seen for subsequent wellness visit.  Hospitalizations in a past:only for childbirth  Once per lifetime Medicare screening tests: ECG - 12/12/2016, nl    AAA risk assessment: 1. FH: none. 2.H/o tobacco smoking: none. 3. CV or PAD: none.    Tobacco use: none   Alcohol use: once a week  Illicit drugs use: none  Diet: healthy heart  Exercise: 5-6 days a week, walking and yoga    Anxiety screening: How many days in the last 2 weeks you were  1. Feeling anxious, nervous, on edge: none  2. Unable to stop worrying: none  3. Worrying too much about different things: none  4. Having problems relaxing:none  5. Feeling restless or unable to sit still:none  6. Feeling irritable or easely annoyed: none  7. Being afraid that something awful might happen: none    Depression screening PHQ9:  Over the past 2 weeks how often have you been bothered by  1. Lack of interest or pleasuer in usual activities: none  2. Feeling down, depressed, hopeless:none  3. Troubles falling asleep/sleeping too long:none  4. Feeling tired, having little energy: none  5. Poor appetite or overeating: none  6. Feeling bad about yourself:none.  7. Trouble concentrating: at the baseline.  8. Moving or speaking too slow or much faster than usual: none  9. Thoughts about harming yourself:none.    Cognitive impairment screening:   Do you have difficulties with:  1. Language: no  2. Behavior: no  3. Learning/retaining new information: no  4. Handling complex tasks: no  5. Reasoning: no  6. Spacial ability and orientation: no    Hearing: normal.  Driving: unrestricted  ADL: independent  ADL details: Does patient needs help with:  telephone use: no  Transportation: no  Housework: no  Shopping: no  meal preparation: " no  laundry: no  managing medication:no  Participate in the social activities: Y    Fall risk factors:   1.Use of alcohol: no    2.Polypharmacy: no  3.H/o of previous fall:  no  4. Mobility impairment:  no  5. Visual impairment:  no  6. Deconditioning: no  7. Use of antihypertensive medication:  no  8. Use of sedatives: no  9. Use of antidepressant: yes    Home safety:   1.loose rugs: no   2.unfamiliar environment: no   3. Uneven floors: no   4. No grab bars in the bathroom: yes, recommended to install  5. No banister on stairs: no      Advanced directives: has Living Will. Discussed. I agree with patient wishes and decision..    Co-managers: ophthalmology , GYN , gastroenterologist     Patient just came from vacation in [place], and she is concerned, that she had some swelling in her legs on arrival to [place], and after her return flight, on arrival to [place].                                       The following portions of the patient's history were reviewed and updated as appropriate: allergies, current medications, past family history, past medical history, past social history, past surgical history and problem list.    Review of Systems   Constitutional: Negative for chills and fever.   HENT: Negative for postnasal drip, sinus pressure and sore throat.    Eyes: Negative for pain and itching.   Respiratory: Negative for cough and chest tightness.    Cardiovascular: Negative for chest pain and leg swelling.   Gastrointestinal: Negative for abdominal pain and blood in stool.   Endocrine: Negative for cold intolerance and heat intolerance.   Genitourinary: Negative for difficulty urinating and flank pain.   Musculoskeletal: Negative for back pain and neck pain.   Skin: Negative for color change and rash.   Neurological: Negative for dizziness, weakness and headaches.   Hematological: Negative for adenopathy. Does not bruise/bleed easily.   Psychiatric/Behavioral: Negative for sleep  disturbance. The patient is not nervous/anxious.        Objective   Physical Exam   Constitutional: She is oriented to person, place, and time. Vital signs are normal. She appears well-developed. No distress.   overweight   HENT:   Head: Normocephalic and atraumatic.   Right Ear: External ear normal.   Left Ear: External ear normal.   Nose: Nose normal. No mucosal edema. Right sinus exhibits no maxillary sinus tenderness and no frontal sinus tenderness. Left sinus exhibits no maxillary sinus tenderness and no frontal sinus tenderness.   Mouth/Throat: Oropharynx is clear and moist. No oropharyngeal exudate.   Eyes: Conjunctivae, EOM and lids are normal. Pupils are equal, round, and reactive to light. Right eye exhibits no discharge. Left eye exhibits no discharge. Right conjunctiva is not injected. Left conjunctiva is not injected. No scleral icterus. Right eye exhibits normal extraocular motion. Left eye exhibits normal extraocular motion.   Neck: Normal range of motion and full passive range of motion without pain. Neck supple. No JVD present. Carotid bruit is not present. No thyromegaly present.   Cardiovascular: Normal rate, regular rhythm, S1 normal, S2 normal, normal heart sounds and intact distal pulses. PMI is not displaced. Exam reveals no gallop and no friction rub.   No murmur heard.  Pulmonary/Chest: Effort normal and breath sounds normal. No accessory muscle usage. No respiratory distress. She has no decreased breath sounds. She has no wheezes. She has no rhonchi. She has no rales.   Abdominal: Soft. Bowel sounds are normal. She exhibits no distension, no pulsatile liver, no fluid wave, no abdominal bruit, no ascites and no mass. There is no tenderness. There is no rebound and no guarding.   Musculoskeletal: She exhibits deformity (hammer toes). She exhibits no edema.   Lymphadenopathy:        Head (right side): No submental, no submandibular, no preauricular, no posterior auricular and no occipital  adenopathy present.        Head (left side): No submental, no submandibular, no tonsillar, no preauricular, no posterior auricular and no occipital adenopathy present.     She has no cervical adenopathy.        Right cervical: No superficial cervical, no deep cervical and no posterior cervical adenopathy present.       Left cervical: No superficial cervical, no deep cervical and no posterior cervical adenopathy present.        Right: No supraclavicular adenopathy present.        Left: No supraclavicular adenopathy present.   Neurological: She is alert and oriented to person, place, and time. She has normal strength and normal reflexes. She displays normal reflexes. No cranial nerve deficit. She exhibits normal muscle tone. Coordination normal.   Reflex Scores:       Bicep reflexes are 2+ on the right side and 2+ on the left side.       Patellar reflexes are 2+ on the right side and 2+ on the left side.  Skin: Skin is warm and dry. No rash noted. She is not diaphoretic. No erythema.   tanned   Psychiatric: She has a normal mood and affect. Her speech is normal and behavior is normal. Thought content normal.   Vitals reviewed.      Assessment/Plan   Verito was seen today for medicare wellness-subsequent and annual exam.    Diagnoses and all orders for this visit:    Visit for screening mammogram  -     Mammo Screening Bilateral With CAD; Future    Pure hypercholesterolemia  -     Comprehensive Metabolic Panel; Future  -     Lipid Panel; Future    Impaired fasting blood sugar  -     Hemoglobin A1c; Future        Annual wellness visit with preventive exam as well as age and risk appropriate councelling completed.    Cardiovascular disease councelling: current. Recommended: Regular aerobic exercise recommended., Weight loss recommended.  Diabetes screening and councelling: current. Recommended: Low carb diet recommended. Needs to avoid starches and sweets., Regular aerobic exercise recommended., Weight loss  recommended.  Breast cancer screening: up to date. Recommended every year..  Osteoporosis screening: up to date. Recommended every 5 years. Next screening is due in 2022..  Glaucoma screening: up to date.   AAA screening: not needed..  Hep C screening (born between [id number]) completed..  Colorectal cancer screening: up to date. Recommended every 5 years. Next screening is recommended in 2022..    Immunizations:   1. Influenza vaccine  is up to date and recommended yearly.   2. Pneumococcal vaccines: completed.   3. Shingles prevention: discussed and recommended to get Shindrix at the pharmacy.      Advanced directives planning: has Living Will. Discussed. I agree with patient wishes and decision..    Medications reviewed and medication list updated.   Potential harmful drug-disease interactions in the elderly:none.  High risk medication in elderly: none  ASA use: no indications.    Elevated fasting blood sugar-patient has family history of diabetes in her father, and needs to make every possible effort to lose weight.  Low-carb diet recommended.  Continue regular exercise.  We will reevaluate in 3 months.  Mild hyperlipidemia-again, patient needs to lose some weight.  We will recheck your fasting lipids in 3 months and we will recalculate her next 10 years cardiovascular risk, as it was still low at 6%, when we calculated it last time year ago.  On the last blood test your cholesterol had slightly increased, but I am reluctant to start statin medication, due to your increased risk of diabetes as above.  Obesity - lifestyle modifications discussed with patient and Weight Watchers and low carb diet, avoiding late dinners, healthy substitutions for calorie-dense snacks recommended. Weight loss target will be at least 10% of body weight. Health benefits of such weight loss explained. Target rate of the weight loss will be 1 lb a week.   Regular exercise recommended.   Episode of lower extremity edema after prolonged  airplane flight.Role of dietary salt restriction in control and prevention of edema explained to the patient. Advised to read labels on comertial food packaging, avoid TV dinners, canned soups and processed foods. Recommended to elevate feet whenever at rest. Wearing compression stoking or travelers socks encouraged, especially for the long car rides, airplane flights and in all situations of prolonged standing or walking.

## 2019-07-24 NOTE — PATIENT INSTRUCTIONS
Serving Sizes  A serving size is a measured amount of food or drink, such as one slice of bread, that has an associated nutrient content. Knowing the serving size of a food or drink can help you determine how much of that food you should consume.  What is the size of one serving?  The size of one healthy serving depends on the food or drink. To determine a serving size, read the food label. If the food or drink does not have a food label, try to find serving size information online. Or, use the following to estimate the size of one adult serving:  Grain  1 slice bread. ½ bagel. ½ cup pasta.  Vegetable  ½ cup cooked or canned vegetables. 1 cup raw, leafy greens.  Fruit  ½ cup canned fruit. 1 medium fruit. ¼ cup dried fruit.  Meat and Other Protein Sources  1 oz meat, poultry, or fish. ¼ cup cooked beans. 1 egg. ¼ cup nuts or seeds. 1 Tbsp nut butter. ¼ cup tofu or tempeh. 2 Tbsp hummus.  Dairy  An individual container of yogurt (6-8 oz). 1 piece of cheese the size of your thumb (1 oz). 1 cup (8 oz) milk or milk alternative.  Fat  A piece the size of one dice. 1 tsp soft margarine. 1 Tbsp mayonnaise. 1 tsp vegetable oil. 1 Tbsp regular salad dressing. 2 Tbsp low-fat salad dressing.  How many servings should I eat from each food group each day?  The following are the suggested number of servings to try and have every day from each food group. You can also look at your eating throughout the week and aim for meeting these requirements on most days for overall healthy eating.  Grain  6-8 servings. Try to have half of your grains from whole grains, such as whole wheat bread, corn tortillas, oatmeal, brown rice, whole wheat pasta, and bulgur.  Vegetable  At least 2½-3 servings.  Fruit  2 servings.  Meat and Other Protein Foods  5-6 servings. Aim to have lean proteins, such as chicken, turkey, fish, beans, or tofu.  Dairy  3 servings. Choose low-fat or nonfat if you are trying to control your weight.  Fat  2-3  servings.  Is a serving the same thing as a portion?  No. A portion is the actual amount you eat, which may be more than one serving. Knowing the specific serving size of a food and the nutritional information that goes with it can help you make a healthy decision on what size portion to eat.  What are some tips to help me learn healthy serving sizes?  · Check food labels for serving sizes. Many foods that come as a single portion actually contain multiple servings.  · Determine the serving size of foods you commonly eat and figure out how large a portion you usually eat.  · Measure the number of servings that can be held by the bowls, glasses, cups, and plates you typically use. For example, pour your breakfast cereal into your regular bowl and then pour it into a measuring cup.  · For 1-2 days, measure the serving sizes of all the foods you eat.  · Practice estimating serving sizes and determining how big your portions should be.  This information is not intended to replace advice given to you by your health care provider. Make sure you discuss any questions you have with your health care provider.  Document Released: 09/15/2004 Document Revised: 08/12/2017 Document Reviewed: 03/17/2015  [employer] Interactive Patient Education © 2018 [employer]    Annual wellness visit with preventive exam as well as age and risk appropriate councelling completed.    Cardiovascular disease councelling: current. Recommended: Regular aerobic exercise recommended., Weight loss recommended.  Diabetes screening and councelling: current. Recommended: Low carb diet recommended. Needs to avoid starches and sweets., Regular aerobic exercise recommended., Weight loss recommended.  Breast cancer screening: up to date. Recommended every year..  Osteoporosis screening: up to date. Recommended every 5 years. Next screening is due in 2022..  Glaucoma screening: up to date.   AAA screening: not needed..  Hep C screening (born between [id number])  completed..  Colorectal cancer screening: up to date. Recommended every 5 years. Next screening is recommended in 2022..    Immunizations:   1. Influenza vaccine  is up to date and recommended yearly.   2. Pneumococcal vaccines: completed.   3. Shingles prevention: discussed and recommended to get Shindrix at the pharmacy.      Advanced directives planning: has Living Will. Discussed. I agree with patient wishes and decision..    Medications reviewed and medication list updated.   Potential harmful drug-disease interactions in the elderly:none.  High risk medication in elderly: none  ASA use: no indications.    Elevated fasting blood sugar-patient has family history of diabetes in her father, and needs to make every possible effort to lose weight.  Low-carb diet recommended.  Continue regular exercise.  We will reevaluate in 3 months.  Mild hyperlipidemia-again, patient needs to lose some weight.  We will recheck your fasting lipids in 3 months and we will recalculate her next 10 years cardiovascular risk, as it was still low at 6%, when we calculated it last time year ago.  On the last blood test your cholesterol had slightly increased, but I am reluctant to start statin medication, due to your increased risk of diabetes as above.  Obesity - lifestyle modifications discussed with patient and Weight Watchers and low carb diet, avoiding late dinners, healthy substitutions for calorie-dense snacks recommended. Weight loss target will be at least 10% of body weight. Health benefits of such weight loss explained. Target rate of the weight loss will be 1 lb a week.   Regular exercise recommended.   Episode of lower extremity edema after prolonged airplane flight.Role of dietary salt restriction in control and prevention of edema explained to the patient. Advised to read labels on comertial food packaging, avoid TV dinners, canned soups and processed foods. Recommended to elevate feet whenever at rest. Wearing compression  stoking or travelers socks encouraged, especially for the long car rides, airplane flights and in all situations of prolonged standing or walking.    Return in about 3 months (around 10/24/2019) for [facility] argueta.  Serving Sizes  A serving size is a measured amount of food or drink, such as one slice of bread, that has an associated nutrient content. Knowing the serving size of a food or drink can help you determine how much of that food you should consume.  What is the size of one serving?  The size of one healthy serving depends on the food or drink. To determine a serving size, read the food label. If the food or drink does not have a food label, try to find serving size information online. Or, use the following to estimate the size of one adult serving:  Grain  1 slice bread. ½ bagel. ½ cup pasta.  Vegetable  ½ cup cooked or canned vegetables. 1 cup raw, leafy greens.  Fruit  ½ cup canned fruit. 1 medium fruit. ¼ cup dried fruit.  Meat and Other Protein Sources  1 oz meat, poultry, or fish. ¼ cup cooked beans. 1 egg. ¼ cup nuts or seeds. 1 Tbsp nut butter. ¼ cup tofu or tempeh. 2 Tbsp hummus.  Dairy  An individual container of yogurt (6-8 oz). 1 piece of cheese the size of your thumb (1 oz). 1 cup (8 oz) milk or milk alternative.  Fat  A piece the size of one dice. 1 tsp soft margarine. 1 Tbsp mayonnaise. 1 tsp vegetable oil. 1 Tbsp regular salad dressing. 2 Tbsp low-fat salad dressing.  How many servings should I eat from each food group each day?  The following are the suggested number of servings to try and have every day from each food group. You can also look at your eating throughout the week and aim for meeting these requirements on most days for overall healthy eating.  Grain  6-8 servings. Try to have half of your grains from whole grains, such as whole wheat bread, corn tortillas, oatmeal, brown rice, whole wheat pasta, and bulgur.  Vegetable  At least 2½-3 servings.  Fruit  2 servings.  Meat and Other  Protein Foods  5-6 servings. Aim to have lean proteins, such as chicken, turkey, fish, beans, or tofu.  Dairy  3 servings. Choose low-fat or nonfat if you are trying to control your weight.  Fat  2-3 servings.  Is a serving the same thing as a portion?  No. A portion is the actual amount you eat, which may be more than one serving. Knowing the specific serving size of a food and the nutritional information that goes with it can help you make a healthy decision on what size portion to eat.  What are some tips to help me learn healthy serving sizes?  · Check food labels for serving sizes. Many foods that come as a single portion actually contain multiple servings.  · Determine the serving size of foods you commonly eat and figure out how large a portion you usually eat.  · Measure the number of servings that can be held by the bowls, glasses, cups, and plates you typically use. For example, pour your breakfast cereal into your regular bowl and then pour it into a measuring cup.  · For 1-2 days, measure the serving sizes of all the foods you eat.  · Practice estimating serving sizes and determining how big your portions should be.  This information is not intended to replace advice given to you by your health care provider. Make sure you discuss any questions you have with your health care provider.  Document Released: 09/15/2004 Document Revised: 08/12/2017 Document Reviewed: 03/17/2015  [employer] Interactive Patient Education © 2018 [employer]

## 2019-10-18 ENCOUNTER — LAB (OUTPATIENT)
Dept: INTERNAL MEDICINE | Facility: CLINIC | Age: 69
End: 2019-10-18

## 2019-10-18 DIAGNOSIS — E78.00 PURE HYPERCHOLESTEROLEMIA: ICD-10-CM

## 2019-10-18 DIAGNOSIS — R73.01 IMPAIRED FASTING BLOOD SUGAR: ICD-10-CM

## 2019-10-18 LAB — HBA1C MFR BLD: 5.2 % (ref 4.8–5.6)

## 2019-10-19 LAB
ALBUMIN SERPL-MCNC: 4.8 G/DL (ref 3.5–5.2)
ALBUMIN/GLOB SERPL: 2.1 G/DL
ALP SERPL-CCNC: 54 U/L (ref 39–117)
ALT SERPL-CCNC: 19 U/L (ref 1–33)
AST SERPL-CCNC: 20 U/L (ref 1–32)
BILIRUB SERPL-MCNC: 0.5 MG/DL (ref 0.2–1.2)
BUN SERPL-MCNC: 11 MG/DL (ref 8–23)
BUN/CREAT SERPL: 13.4 (ref 7–25)
CALCIUM SERPL-MCNC: 9.5 MG/DL (ref 8.6–10.5)
CHLORIDE SERPL-SCNC: 100 MMOL/L (ref 98–107)
CHOLEST SERPL-MCNC: 203 MG/DL (ref 0–200)
CO2 SERPL-SCNC: 23.5 MMOL/L (ref 22–29)
CREAT SERPL-MCNC: 0.82 MG/DL (ref 0.57–1)
GLOBULIN SER CALC-MCNC: 2.3 GM/DL
GLUCOSE SERPL-MCNC: 82 MG/DL (ref 65–99)
HDLC SERPL-MCNC: 66 MG/DL (ref 40–60)
LDLC SERPL CALC-MCNC: 125 MG/DL (ref 0–100)
POTASSIUM SERPL-SCNC: 4.5 MMOL/L (ref 3.5–5.2)
PROT SERPL-MCNC: 7.1 G/DL (ref 6–8.5)
SODIUM SERPL-SCNC: 139 MMOL/L (ref 136–145)
TRIGL SERPL-MCNC: 59 MG/DL (ref 0–150)
VLDLC SERPL-MCNC: 11.8 MG/DL

## 2019-10-25 ENCOUNTER — OFFICE VISIT (OUTPATIENT)
Dept: INTERNAL MEDICINE | Facility: CLINIC | Age: 69
End: 2019-10-25

## 2019-10-25 VITALS
WEIGHT: 160 LBS | DIASTOLIC BLOOD PRESSURE: 78 MMHG | BODY MASS INDEX: 28.35 KG/M2 | RESPIRATION RATE: 14 BRPM | SYSTOLIC BLOOD PRESSURE: 120 MMHG | HEIGHT: 63 IN

## 2019-10-25 DIAGNOSIS — E78.00 PURE HYPERCHOLESTEROLEMIA: Primary | ICD-10-CM

## 2019-10-25 DIAGNOSIS — E66.3 OVERWEIGHT (BMI 25.0-29.9): ICD-10-CM

## 2019-10-25 DIAGNOSIS — Z23 NEED FOR VACCINATION: ICD-10-CM

## 2019-10-25 DIAGNOSIS — R73.01 IMPAIRED FASTING BLOOD SUGAR: ICD-10-CM

## 2019-10-25 PROCEDURE — 90662 IIV NO PRSV INCREASED AG IM: CPT | Performed by: INTERNAL MEDICINE

## 2019-10-25 PROCEDURE — G0008 ADMIN INFLUENZA VIRUS VAC: HCPCS | Performed by: INTERNAL MEDICINE

## 2019-10-25 PROCEDURE — 99214 OFFICE O/P EST MOD 30 MIN: CPT | Performed by: INTERNAL MEDICINE

## 2019-10-25 NOTE — PROGRESS NOTES
"Subjective   Verito العراقي is a 69 y.o. female.     History of Present Illness     /78 (BP Location: Left arm, Patient Position: Sitting, Cuff Size: Adult)   Resp 14   Ht 160 cm (63\")   Wt 72.6 kg (160 lb)   BMI 28.34 kg/m²     Current Outpatient Medications:   •  Calcium Carbonate-Vitamin D (CALCIUM + D PO), Take 1 tablet by mouth daily., Disp: , Rfl:   •  calcium carbonate-vitamin d 600-400 MG-UNIT per tablet, Take 1 tablet by mouth daily as needed., Disp: , Rfl:   •  Cetirizine HCl (ZYRTEC PO), Take 1 tablet by mouth daily., Disp: , Rfl:   •  desvenlafaxine (PRISTIQ) 50 MG 24 hr tablet, Take 1 tablet by mouth Daily., Disp: 90 tablet, Rfl: 3  •  Flaxseed, Linseed, (FLAX SEED OIL) 1300 MG capsule, Take by mouth., Disp: , Rfl:   •  fluticasone (FLONASE) 50 MCG/ACT nasal spray, SPRAY TWO SPRAYS IN EACH NOSTRIL ONCE DAILY, Disp: 16 mL, Rfl: 9  •  glucosamine-chondroitin 500-400 MG capsule capsule, Take 2 capsules by mouth daily., Disp: , Rfl:   •  Multiple Vitamins tablet, Take by mouth., Disp: , Rfl:   •  vitamin C (ASCORBIC ACID) 500 MG tablet, Take 1 tablet by mouth daily., Disp: , Rfl:   •  vitamin E 400 UNIT capsule, Take 1 capsule by mouth daily., Disp: , Rfl:     Patient has been diagnosed with hyperlipidemia. Target LDL is < 130 mg/dl (2 or more risk factors are present). Patient is on low fat diet with good compliance. Exercise 5-6 days a week and walking.She had increased her walking to 5 mi daily since we met last, and had lost 10 lbs in the last 3 months.    Patient had been noticed to have elevated fasting blood sugars, ans her FH of DM is of concern. Verito العراقي uses diet and exercise for blood sugars control. Patient does not check home blood sugars.. Compliance with low carb diabetic diet is good.    Patient is here for weight management. Last time we had addressed this problem 3 months ago. At that time BMI was 30.1. Dietary  and lifestyle changes were discussed and low carb diet and " incerased exercise was prescribed. Patient had lost 10 lbs since last visit. Today patient's BMI is 28.4.  Patient reports good compliance with low carb. Exercise: 6 days a week.       . The following portions of the patient's history were reviewed and updated as appropriate: allergies, current medications, past family history, past medical history, past social history, past surgical history and problem list.    Review of Systems   Constitutional: Negative for chills and fever.   Eyes: Negative for pain and redness.   Respiratory: Negative for cough and shortness of breath.    Cardiovascular: Negative for chest pain and leg swelling.   Neurological: Negative for dizziness and headaches.       Objective   Physical Exam   Constitutional: She is oriented to person, place, and time. She appears well-developed.   overweight   HENT:   Head: Normocephalic and atraumatic.   Right Ear: Tympanic membrane, external ear and ear canal normal.   Left Ear: Tympanic membrane, external ear and ear canal normal.   Nose: Nose normal. Right sinus exhibits no maxillary sinus tenderness and no frontal sinus tenderness. Left sinus exhibits no maxillary sinus tenderness and no frontal sinus tenderness.   Mouth/Throat: Uvula is midline, oropharynx is clear and moist and mucous membranes are normal.   Eyes: Conjunctivae and EOM are normal. Pupils are equal, round, and reactive to light. Right eye exhibits no discharge. Left eye exhibits no discharge. No scleral icterus.   Neck: Neck supple. No JVD present.   Cardiovascular: Normal rate, regular rhythm and normal heart sounds. Exam reveals no gallop and no friction rub.   No murmur heard.  Pulmonary/Chest: Effort normal and breath sounds normal. She has no wheezes. She has no rales.   Musculoskeletal: She exhibits no edema.   Lymphadenopathy:     She has no cervical adenopathy.   Neurological: She is alert and oriented to person, place, and time. No cranial nerve deficit.   Skin: Skin is warm  and dry. No rash noted.   tanned   Psychiatric: She has a normal mood and affect. Her behavior is normal.   Vitals reviewed.      Assessment/Plan   Verito was seen today for hyperlipidemia, blood sugar and weight check.    Diagnoses and all orders for this visit:    Pure hypercholesterolemia    Impaired fasting blood sugar    Overweight (BMI 25.0-29.9)        Hyperlipidemia - well controlled with diet and exercise. Normal liver function tests. LDL target is below < 130 mg/dl (2 or more risk factors are present). Patient's 125. Continue same regular exercise.  Impaired fasting blood sugars - resolved weith weight loss and increased exercise.. Hb A1C is   Lab Results   Component Value Date    HGBA1C 5.20 10/18/2019    .  No hypoglycemic episodes. Low carb diet and regular exercise recommended. Weight loss will be very beneficial.  Overweight - doing great with increase exercise and low carb diet. Continue same efforts.

## 2019-10-25 NOTE — PATIENT INSTRUCTIONS
Hyperlipidemia - well controlled with diet and exercise. Normal liver function tests. LDL target is below < 130 mg/dl (2 or more risk factors are present). Patient's 125. Continue same regular exercise.  Impaired fasting blood sugars - resolved weith weight loss and increased exercise.. Hb A1C is   Lab Results   Component Value Date    HGBA1C 5.20 10/18/2019    .  No hypoglycemic episodes. Low carb diet and regular exercise recommended. Weight loss will be very beneficial.  Overweight - doing great with increase exercise and low carb diet. Continue same efforts

## 2019-11-08 ENCOUNTER — TELEPHONE (OUTPATIENT)
Dept: INTERNAL MEDICINE | Facility: CLINIC | Age: 69
End: 2019-11-08

## 2019-11-08 NOTE — TELEPHONE ENCOUNTER
Pt called and is requesting a copy of her blood work from her appt at the end of October.  Pt is requesting the be mailed to her home address if possible.  I advised her she may have to pick them up and request a phone call when they are ready.     [address] [place] [address]

## 2019-12-06 ENCOUNTER — TELEPHONE (OUTPATIENT)
Dept: INTERNAL MEDICINE | Facility: CLINIC | Age: 69
End: 2019-12-06

## 2019-12-06 RX ORDER — ALBUTEROL SULFATE 90 UG/1
2 AEROSOL, METERED RESPIRATORY (INHALATION) EVERY 4 HOURS PRN
Qty: 18 G | Refills: 0 | Status: SHIPPED | OUTPATIENT
Start: 2019-12-06 | End: 2019-12-23

## 2019-12-06 NOTE — TELEPHONE ENCOUNTER
Patient was calling to ask if something could be called in for her chest congestion. Patient stated it has to be allergy related because she has had it before. Patient has been taking Robitussin for a week. Patient was offered an appointment as well as the walk in clinic hours but patient really just wanted something called in to her [facility] [address] [facility] patient would like a call when this is done or if theres an issue [phone]

## 2019-12-06 NOTE — TELEPHONE ENCOUNTER
Patient called and stated she is normally on this medication and it is not helping. Patient would like something prescribed in addition to the advair.    Pt call back  [phone]

## 2019-12-06 NOTE — TELEPHONE ENCOUNTER
Please call in Advair discus 250/50, 1 inhalation twice a day.  Rinse mouth with water after using.

## 2019-12-16 ENCOUNTER — APPOINTMENT (OUTPATIENT)
Dept: WOMENS IMAGING | Facility: HOSPITAL | Age: 69
End: 2019-12-16

## 2019-12-16 PROCEDURE — 77063 BREAST TOMOSYNTHESIS BI: CPT | Performed by: RADIOLOGY

## 2019-12-16 PROCEDURE — 77067 SCR MAMMO BI INCL CAD: CPT | Performed by: RADIOLOGY

## 2019-12-23 RX ORDER — ALBUTEROL SULFATE 90 UG/1
AEROSOL, METERED RESPIRATORY (INHALATION)
Qty: 1 INHALER | Refills: 0 | Status: SHIPPED | OUTPATIENT
Start: 2019-12-23 | End: 2022-07-08

## 2020-01-22 ENCOUNTER — APPOINTMENT (OUTPATIENT)
Dept: WOMENS IMAGING | Facility: HOSPITAL | Age: 70
End: 2020-01-22

## 2020-01-22 PROCEDURE — 76641 ULTRASOUND BREAST COMPLETE: CPT | Performed by: RADIOLOGY

## 2020-01-22 PROCEDURE — G0279 TOMOSYNTHESIS, MAMMO: HCPCS | Performed by: RADIOLOGY

## 2020-01-22 PROCEDURE — 77065 DX MAMMO INCL CAD UNI: CPT | Performed by: RADIOLOGY

## 2020-07-27 ENCOUNTER — OFFICE VISIT (OUTPATIENT)
Dept: FAMILY MEDICINE CLINIC | Facility: CLINIC | Age: 70
End: 2020-07-27

## 2020-07-27 VITALS
DIASTOLIC BLOOD PRESSURE: 68 MMHG | TEMPERATURE: 98 F | HEART RATE: 80 BPM | OXYGEN SATURATION: 98 % | BODY MASS INDEX: 31.01 KG/M2 | WEIGHT: 175 LBS | HEIGHT: 63 IN | SYSTOLIC BLOOD PRESSURE: 112 MMHG

## 2020-07-27 DIAGNOSIS — Z91.09 ENVIRONMENTAL ALLERGIES: Primary | ICD-10-CM

## 2020-07-27 DIAGNOSIS — E78.00 PURE HYPERCHOLESTEROLEMIA: ICD-10-CM

## 2020-07-27 DIAGNOSIS — F41.8 ANXIETY ASSOCIATED WITH DEPRESSION: ICD-10-CM

## 2020-07-27 PROCEDURE — 99214 OFFICE O/P EST MOD 30 MIN: CPT | Performed by: FAMILY MEDICINE

## 2020-07-27 RX ORDER — DESVENLAFAXINE SUCCINATE 50 MG/1
50 TABLET, EXTENDED RELEASE ORAL DAILY
Qty: 90 TABLET | Refills: 1 | Status: CANCELLED | OUTPATIENT
Start: 2020-07-27

## 2020-07-27 NOTE — PROGRESS NOTES
Verito العراقي is a 70 y.o. female.     Chief Complaint   Patient presents with   • Establish Care     new pt establishing today with dr estrada    • Allergies     pt takes allergies medication every day is doing ok sometimes needs the inhaler        HPI     Pt is a pleasant 70 y.o. YO female here for today for environmental allergies.  She is a new patient to me and is here to establish care, she is transferring care from another [facility] PCP.  Her other medical problems include hyperlipidemia, anxiety with depression, and mitral valve prolapse with diastolic dysfunction.    Environmental Allergies -- chronic, well controlled. Has some allergies year round but worse in the spring and fall. Takes cetirizine and Flonase daily.  She did have episode last winter where she required an albuterol inhaler.  No personal history of asthma.     Hyperlipidemia - chronic, seems to be mostly diet related and fluctuates with weight.  Patient typically gained some weight with her travels and over the summer, she is hoping to lose weight prior to lipids next being checked.  Last lipid check was October 2019.    Anxiety - chronic, stable.  She only started to struggle with this problem in the last couple of years.  Thinks she was started on treatment a bit over a year ago.  Currently takes desvenlafaxine 50 mg once daily.  Denies any negative side effects with this medication and feels that it does help her symptoms.    Mitral valve prolapse and diastolic dysfunction found incidentally based on a murmur and on subsequent echo.  Patient is asymptomatic.    The following portions of the patient's history were reviewed by me and updated as appropriate: allergies, current medications, past family history, past medical history, past social history, past surgical history, and problem list.     Review of Systems   Constitutional: Negative.    HENT: Negative.    Eyes: Negative for discharge and itching.   Respiratory: Negative.     Cardiovascular: Negative for chest pain, palpitations and leg swelling.   Gastrointestinal: Negative.    Endocrine: Negative.  Negative for cold intolerance and heat intolerance.   Genitourinary: Negative.    Musculoskeletal: Negative.  Negative for arthralgias and back pain.   Skin: Negative.    Allergic/Immunologic: Positive for environmental allergies. Negative for food allergies and immunocompromised state.   Neurological: Negative.  Negative for dizziness and facial asymmetry.   Hematological: Negative for adenopathy. Does not bruise/bleed easily.   Psychiatric/Behavioral: Negative for agitation and behavioral problems.     I have reviewed and confirmed the accuracy of the ROS as documented by the MA/LPN/RN Holli Maria MD    Objective  Vitals:    07/27/20 1251   BP: 112/68   Pulse: 80   Temp: 98 °F (36.7 °C)   SpO2: 98%     Body mass index is 31 kg/m².       Physical Exam   Constitutional: She is oriented to person, place, and time. She appears well-developed and well-nourished. No distress.   HENT:   Head: Normocephalic and atraumatic.   Nose: Nose normal.   Eyes: Conjunctivae are normal. Right eye exhibits no discharge. Left eye exhibits no discharge. No scleral icterus.   Neck: Neck supple. No thyromegaly present.   Cardiovascular: Normal rate and regular rhythm.   No murmur heard.  Pulmonary/Chest: Effort normal and breath sounds normal. No respiratory distress.   Lymphadenopathy:     She has no cervical adenopathy.   Neurological: She is alert and oriented to person, place, and time.   Skin: No erythema. No pallor.   Psychiatric: She has a normal mood and affect. Her behavior is normal. Judgment and thought content normal.   Vitals reviewed.        Current Outpatient Medications:   •  albuterol sulfate  (90 Base) MCG/ACT inhaler, INHALE TWO PUFFS BY MOUTH EVERY 4 HOURS AS NEEDED FOR WHEEZING, Disp: 1 inhaler, Rfl: 0  •  calcium carbonate-vitamin d 600-400 MG-UNIT per tablet, Take 1 tablet  by mouth daily as needed., Disp: , Rfl:   •  Cetirizine HCl (ZYRTEC PO), Take 1 tablet by mouth daily., Disp: , Rfl:   •  desvenlafaxine (PRISTIQ) 50 MG 24 hr tablet, Take 1 tablet by mouth Daily., Disp: 90 tablet, Rfl: 3  •  Flaxseed, Linseed, (FLAX SEED OIL) 1300 MG capsule, Take by mouth., Disp: , Rfl:   •  fluticasone (FLONASE) 50 MCG/ACT nasal spray, SPRAY TWO SPRAYS IN EACH NOSTRIL ONCE DAILY, Disp: 16 mL, Rfl: 9  •  Multiple Vitamins tablet, Take by mouth., Disp: , Rfl:   •  vitamin C (ASCORBIC ACID) 500 MG tablet, Take 1 tablet by mouth daily., Disp: , Rfl:   •  vitamin E 400 UNIT capsule, Take 1 capsule by mouth daily., Disp: , Rfl:   •  glucosamine-chondroitin 500-400 MG capsule capsule, Take 2 capsules by mouth daily., Disp: , Rfl:     Procedures    Lab Results (most recent)     None              Verito was seen today for establish care and allergies.    Diagnoses and all orders for this visit:    Environmental allergies -chronic, well-controlled on current therapy with daily cetirizine and fluticasone.  We will continue the same with no changes.    Pure hypercholesterolemia -chronic, stable, had improved on last check with diet changes and weight loss.  Patient is motivated to make similar changes now.  We will plan on checking when she can be fasting at a follow-up visit in about 3 months.  At that time will estimate her ASCVD risk and determine if further treatment or interventions are necessary.    Anxiety associated with depression -chronic, stable, symptoms are decently controlled per patient report.  Tolerating treatment with desvenlafaxine 50 mg daily well.  We will continue the same.    Encourage patient to have a Medicare wellness visit but she declined.  We will plan on follow-up for labs and cholesterol discussion in about 3 months.    Return in about 3 months (around 10/27/2020) for Recheck, needs AM appointment - fasting for labs/cholesterol.      Holli Maria MD

## 2020-08-24 DIAGNOSIS — F41.8 ANXIETY ASSOCIATED WITH DEPRESSION: ICD-10-CM

## 2020-08-24 NOTE — TELEPHONE ENCOUNTER
Caller: Verito العراقي    Relationship: Self    Best call back number: [phone]     Medication needed:   Requested Prescriptions     Pending Prescriptions Disp Refills   • desvenlafaxine (PRISTIQ) 50 MG 24 hr tablet 90 tablet 3     Sig: Take 1 tablet by mouth Daily.       When do you need the refill by: 08/25/2020    What details did the patient provide when requesting the medication: HAS ABOUT 15 LEFT    Does the patient have less than a 3 day supply:  [] Yes  [x] No    What is the patient's preferred pharmacy:       [facility] [address] [place] [address] [address] AT [facility] [address] - [phone]  - [phone] FX

## 2020-08-25 RX ORDER — DESVENLAFAXINE SUCCINATE 50 MG/1
50 TABLET, EXTENDED RELEASE ORAL DAILY
Qty: 90 TABLET | Refills: 3 | Status: SHIPPED | OUTPATIENT
Start: 2020-08-25 | End: 2021-07-30

## 2020-08-25 NOTE — TELEPHONE ENCOUNTER
Are you okay with refilling this does not look like you were the one refilling it but you have seen patient recently

## 2020-09-25 DIAGNOSIS — J30.1 SEASONAL ALLERGIC RHINITIS DUE TO POLLEN: ICD-10-CM

## 2020-09-25 RX ORDER — FLUTICASONE PROPIONATE 50 MCG
2 SPRAY, SUSPENSION (ML) NASAL DAILY
Qty: 16 ML | Refills: 9 | Status: SHIPPED | OUTPATIENT
Start: 2020-09-25 | End: 2021-11-04 | Stop reason: SDUPTHER

## 2020-11-18 ENCOUNTER — OFFICE VISIT (OUTPATIENT)
Dept: FAMILY MEDICINE CLINIC | Facility: CLINIC | Age: 70
End: 2020-11-18

## 2020-11-18 VITALS
OXYGEN SATURATION: 98 % | DIASTOLIC BLOOD PRESSURE: 76 MMHG | HEART RATE: 76 BPM | SYSTOLIC BLOOD PRESSURE: 118 MMHG | BODY MASS INDEX: 30.83 KG/M2 | TEMPERATURE: 96.9 F | WEIGHT: 174 LBS | HEIGHT: 63 IN

## 2020-11-18 DIAGNOSIS — R73.01 IMPAIRED FASTING BLOOD SUGAR: ICD-10-CM

## 2020-11-18 DIAGNOSIS — Z23 NEED FOR VACCINATION: ICD-10-CM

## 2020-11-18 DIAGNOSIS — Z13.0 SCREENING FOR DEFICIENCY ANEMIA: ICD-10-CM

## 2020-11-18 DIAGNOSIS — E55.9 HYPOVITAMINOSIS D: ICD-10-CM

## 2020-11-18 DIAGNOSIS — E78.00 PURE HYPERCHOLESTEROLEMIA: Primary | ICD-10-CM

## 2020-11-18 PROCEDURE — 99212 OFFICE O/P EST SF 10 MIN: CPT | Performed by: FAMILY MEDICINE

## 2020-11-18 NOTE — PROGRESS NOTES
Verito العراقي is a 70 y.o. female.     Chief Complaint   Patient presents with   • Hyperlipidemia     pt is fasting for labs      Masks/face shield/appropriate PPE were worn for the entirety of the visit by the patient, MA, and provider.     HPI     Pt is a pleasant 70 y.o. YO female here for follow-up of hyperlipidemia.  She has never been on treatment for her cholesterol levels, they often fluctuate with her weight.  Levels have not been checked for the last year.  She is fasting today for levels to be checked.    Not due for colonoscopy until 2022, Mammogram due January 2021, DEXA 2022    The following portions of the patient's history were reviewed by me and updated as appropriate: allergies, current medications, past family history, past medical history, past social history, past surgical history, and problem list.     Review of Systems   Constitutional: Negative.    HENT: Negative.    Eyes: Negative.    Respiratory: Negative.    Cardiovascular: Negative.  Negative for chest pain, palpitations and leg swelling.   Gastrointestinal: Negative.    Endocrine: Negative.    Genitourinary: Negative.    Musculoskeletal: Negative.    Skin: Negative.    Allergic/Immunologic: Negative.    Neurological: Negative.    Hematological: Negative.    Psychiatric/Behavioral: Negative.    I have reviewed and confirmed the accuracy of the ROS as documented by the MA/LPN/RN Holli Maria MD    Objective  Vitals:    11/18/20 1242   BP: 118/76   Pulse: 76   Temp: 96.9 °F (36.1 °C)   SpO2: 98%     Body mass index is 30.82 kg/m².       Physical Exam  Vitals signs reviewed.   Constitutional:       General: She is not in acute distress.     Appearance: She is well-developed.   HENT:      Head: Normocephalic and atraumatic.   Eyes:      General: No scleral icterus.        Right eye: No discharge.         Left eye: No discharge.      Conjunctiva/sclera: Conjunctivae normal.   Pulmonary:      Effort: Pulmonary effort is normal. No  respiratory distress.   Skin:     Coloration: Skin is not pale.      Findings: No erythema.   Neurological:      Mental Status: She is alert and oriented to person, place, and time.   Psychiatric:         Behavior: Behavior normal.         Thought Content: Thought content normal.         Judgment: Judgment normal.           Current Outpatient Medications:   •  Cetirizine HCl (ZYRTEC PO), Take 1 tablet by mouth daily., Disp: , Rfl:   •  desvenlafaxine (PRISTIQ) 50 MG 24 hr tablet, Take 1 tablet by mouth Daily., Disp: 90 tablet, Rfl: 3  •  fluticasone (FLONASE) 50 MCG/ACT nasal spray, 2 sprays by Each Nare route Daily., Disp: 16 mL, Rfl: 9  •  glucosamine-chondroitin 500-400 MG capsule capsule, Take 2 capsules by mouth daily., Disp: , Rfl:   •  Multiple Vitamins tablet, Take by mouth., Disp: , Rfl:   •  albuterol sulfate  (90 Base) MCG/ACT inhaler, INHALE TWO PUFFS BY MOUTH EVERY 4 HOURS AS NEEDED FOR WHEEZING, Disp: 1 inhaler, Rfl: 0  •  calcium carbonate-vitamin d 600-400 MG-UNIT per tablet, Take 1 tablet by mouth daily as needed., Disp: , Rfl:   •  Flaxseed, Linseed, (FLAX SEED OIL) 1300 MG capsule, Take by mouth., Disp: , Rfl:   •  vitamin C (ASCORBIC ACID) 500 MG tablet, Take 1 tablet by mouth daily., Disp: , Rfl:   •  vitamin E 400 UNIT capsule, Take 1 capsule by mouth daily., Disp: , Rfl:     Current Facility-Administered Medications:   •  pneumococcal polysaccharide 23-valent (PNEUMOVAX-23) vaccine 0.5 mL, 0.5 mL, Intramuscular, Once, Holli Bowers MD    Procedures    Lab Results (most recent)     None              Diagnoses and all orders for this visit:    1. Pure hypercholesterolemia (Primary)  -     Lipid Panel  -     Comprehensive Metabolic Panel    2. Screening for deficiency anemia  -     CBC & Differential    3. Impaired fasting blood sugar  -     Comprehensive Metabolic Panel  -     Hemoglobin A1c    4. Hypovitaminosis D  -     Vitamin D 25 hydroxy    5. Need for vaccination  -      pneumococcal polysaccharide 23-valent (PNEUMOVAX-23) vaccine 0.5 mL    Patient presents today for routine follow-up and primarily for fasting blood work.  She has of elevated cholesterol levels in the past which we discussed at her last visit.  Has never been on treatment for them but does try to manage with weight maintenance.  We will check levels today and calculate her ASCVD risk to determine if she needs any further treatment for this.  We will also do a screening CBC for anemia and check a routine CMP, A1c, and vitamin D.      Return in about 8 months (around 7/18/2021) for Medicare Wellness.      Holli Maria MD

## 2020-11-19 LAB
25(OH)D3+25(OH)D2 SERPL-MCNC: 48.5 NG/ML (ref 30–100)
ALBUMIN SERPL-MCNC: 4.5 G/DL (ref 3.5–5.2)
ALBUMIN/GLOB SERPL: 2 G/DL
ALP SERPL-CCNC: 62 U/L (ref 39–117)
ALT SERPL-CCNC: 17 U/L (ref 1–33)
AST SERPL-CCNC: 21 U/L (ref 1–32)
BASOPHILS # BLD AUTO: 0.08 10*3/MM3 (ref 0–0.2)
BASOPHILS NFR BLD AUTO: 1.4 % (ref 0–1.5)
BILIRUB SERPL-MCNC: 0.4 MG/DL (ref 0–1.2)
BUN SERPL-MCNC: 13 MG/DL (ref 8–23)
BUN/CREAT SERPL: 18.1 (ref 7–25)
CALCIUM SERPL-MCNC: 9.7 MG/DL (ref 8.6–10.5)
CHLORIDE SERPL-SCNC: 95 MMOL/L (ref 98–107)
CHOLEST SERPL-MCNC: 223 MG/DL (ref 0–200)
CO2 SERPL-SCNC: 27.5 MMOL/L (ref 22–29)
CREAT SERPL-MCNC: 0.72 MG/DL (ref 0.57–1)
EOSINOPHIL # BLD AUTO: 0.15 10*3/MM3 (ref 0–0.4)
EOSINOPHIL NFR BLD AUTO: 2.7 % (ref 0.3–6.2)
ERYTHROCYTE [DISTWIDTH] IN BLOOD BY AUTOMATED COUNT: 12.5 % (ref 12.3–15.4)
GLOBULIN SER CALC-MCNC: 2.3 GM/DL
GLUCOSE SERPL-MCNC: 91 MG/DL (ref 65–99)
HBA1C MFR BLD: 5.3 % (ref 4.8–5.6)
HCT VFR BLD AUTO: 43.9 % (ref 34–46.6)
HDLC SERPL-MCNC: 90 MG/DL (ref 40–60)
HGB BLD-MCNC: 14.6 G/DL (ref 12–15.9)
IMM GRANULOCYTES # BLD AUTO: 0.01 10*3/MM3 (ref 0–0.05)
IMM GRANULOCYTES NFR BLD AUTO: 0.2 % (ref 0–0.5)
LDLC SERPL CALC-MCNC: 124 MG/DL (ref 0–100)
LYMPHOCYTES # BLD AUTO: 1.85 10*3/MM3 (ref 0.7–3.1)
LYMPHOCYTES NFR BLD AUTO: 33 % (ref 19.6–45.3)
MCH RBC QN AUTO: 30.4 PG (ref 26.6–33)
MCHC RBC AUTO-ENTMCNC: 33.3 G/DL (ref 31.5–35.7)
MCV RBC AUTO: 91.5 FL (ref 79–97)
MONOCYTES # BLD AUTO: 0.61 10*3/MM3 (ref 0.1–0.9)
MONOCYTES NFR BLD AUTO: 10.9 % (ref 5–12)
NEUTROPHILS # BLD AUTO: 2.9 10*3/MM3 (ref 1.7–7)
NEUTROPHILS NFR BLD AUTO: 51.8 % (ref 42.7–76)
NRBC BLD AUTO-RTO: 0 /100 WBC (ref 0–0.2)
PLATELET # BLD AUTO: 244 10*3/MM3 (ref 140–450)
POTASSIUM SERPL-SCNC: 4.6 MMOL/L (ref 3.5–5.2)
PROT SERPL-MCNC: 6.8 G/DL (ref 6–8.5)
RBC # BLD AUTO: 4.8 10*6/MM3 (ref 3.77–5.28)
SODIUM SERPL-SCNC: 134 MMOL/L (ref 136–145)
TRIGL SERPL-MCNC: 51 MG/DL (ref 0–150)
VLDLC SERPL CALC-MCNC: 9 MG/DL (ref 5–40)
WBC # BLD AUTO: 5.6 10*3/MM3 (ref 3.4–10.8)

## 2020-12-10 ENCOUNTER — TELEPHONE (OUTPATIENT)
Dept: FAMILY MEDICINE CLINIC | Facility: CLINIC | Age: 70
End: 2020-12-10

## 2020-12-10 NOTE — TELEPHONE ENCOUNTER
Caller: Verito Rodas    Relationship to patient: Self    Best call back number: [phone]     Concerns or Questions if Applicable:MS. RODAS IS WANTING TO   KNOW IF SHE WOULD HAVE TO GET 2 SHINGREX SHOTS INSTEAD OF 1 THIS YEAR 2020.    PLEASE ADVISE

## 2020-12-11 ENCOUNTER — TELEPHONE (OUTPATIENT)
Dept: FAMILY MEDICINE CLINIC | Facility: CLINIC | Age: 70
End: 2020-12-11

## 2020-12-11 NOTE — TELEPHONE ENCOUNTER
** hub ok to read** left message to inform patient the shingrix vaccine is a 2 part vaccine now. She will get one now and one in 2-6 months.

## 2020-12-11 NOTE — TELEPHONE ENCOUNTER
Pt CALLED STATING SHE JUST NEEDED TO KNOW WHAT SHINGLES VACCINE SHE RECEIVED A YEAR AGO. SHE RECEIVED IT FROM DR RAMIREZ. CAN LEAVE A VM.

## 2020-12-17 ENCOUNTER — APPOINTMENT (OUTPATIENT)
Dept: WOMENS IMAGING | Facility: HOSPITAL | Age: 70
End: 2020-12-17

## 2020-12-17 PROCEDURE — 77063 BREAST TOMOSYNTHESIS BI: CPT | Performed by: RADIOLOGY

## 2020-12-17 PROCEDURE — 77067 SCR MAMMO BI INCL CAD: CPT | Performed by: RADIOLOGY

## 2021-03-09 DIAGNOSIS — Z23 IMMUNIZATION DUE: ICD-10-CM

## 2021-06-21 NOTE — PROGRESS NOTES
"Chief Complaint  Establish Care (new pt - nonfasting), Headache (intermittent headaches and light dizziness 2-3 weekly), and Depression (pt would like to change med - doesn't want \"something so heavy\")     Masks/face shield/appropriate PPE were worn for the entirety of the visit by the patient, MA, and provider.     Subjective          Verito العراقي presents to [facility]  History of Present Illness  Verito العراقي is a pleasent 71 y.o. female who presents to the clinic today to establish care. Patient also has complaints of occasional dizziness and headaches. She would also like to start on a new medication for anxiety.  Patient's last follow-up appointment was 11/18/2020.  Patient has a past medical history of hyperlipidemia, anxiety with depression, and mitral valve prolapse with diastolic dysfunction.    Dizziness: New finding.  Patient reports her dizziness started about a month ago.  She states it is occasional and mild and does not prevent her from performing normal daily activities. Dizziness typically occurs in the middle of the day, but does not occur daily. She believes it is  triggered by exercise, like walking and does not last long. She denies associated symptoms with dizziness including chest pain, shortness of breath, heart palpitations, fatigue, nausea, or vomiting.  She does occasionally have a headache with the dizziness that is located on the top of her head. The headache is mild. She denies changes in her diet and medications. She drinks about 3-4 glasses of water a day.     Anxiety: Patient has been prescribed desvenlafaxine 50 mg p.o. once daily and stopped taking it in late winter of 2020. She also was taking it intermittently. The last time she tried taking the medication was late January.  She describes feelings of being overwhelmed. Her symptoms are not every day of the week. She denies feelings of depression.     Environmental allergies: Reportedly " "chronic and well controlled.  Patient taking cetirizine and Flonase daily.    Hyperlipidemia - Chronic.  Last lipid panel was obtained on 11/18/2020 that resulted elevated total cholesterol 223, elevated HDL 90, and elevated LDL of 124.  Patient was encouraged to limit fatty and fried foods. ASVD 10 year risk score 7.7%. She has tried walking every day. She reports having an overall healthy diet. She has limited fried foods.  She does eat cheese and crackers daily and drinks 1 glass of wine daily.    Mitral valve prolapse and diastolic dysfunction: Found incidentally based on a murmur and on subsequent echo.  Patient reportedly asymptomatic today. Denies heart palpitations, shortness of breath, or chest pain.  Patient does not follow with a cardiologist.    Cancer screenings: Her mammogram was previously ordered annually by her OBGYN. She has a history of colon polyps and is due for her colonoscopy in 2022.     Review of Systems   Constitutional: Negative.    Eyes: Negative.    Respiratory: Negative.    Cardiovascular: Negative.    Gastrointestinal: Negative.    Endocrine: Negative.    Musculoskeletal: Negative.    Skin: Negative.    Allergic/Immunologic: Negative.    Neurological: Positive for dizziness and headaches.   Hematological: Negative.    Psychiatric/Behavioral: Negative.      Objective   Vital Signs:   /74 (BP Location: Right arm, Patient Position: Sitting)   Pulse 69   Temp 97.7 °F (36.5 °C)   Ht 160 cm (63\")   Wt 80.3 kg (177 lb)   SpO2 98%   BMI 31.35 kg/m²       Vitals:    06/25/21 1045 06/25/21 1122 06/25/21 1123 06/25/21 1124   Orthostatic BP:  120/76 116/70 120/74   Orthostatic Pulse:  77 93 78   Patient Position: Sitting Lying Sitting Standing       Physical Exam  Constitutional:       General: She is not in acute distress.     Appearance: Normal appearance. She is not ill-appearing, toxic-appearing or diaphoretic.   HENT:      Head: Normocephalic and atraumatic.      Right Ear: " Tympanic membrane, ear canal and external ear normal. No swelling. There is no impacted cerumen. Tympanic membrane is not bulging.      Left Ear: Tympanic membrane, ear canal and external ear normal. No swelling. There is no impacted cerumen. Tympanic membrane is not bulging.   Eyes:      General: No scleral icterus.        Right eye: No discharge.      Extraocular Movements: Extraocular movements intact.      Right eye: No nystagmus.      Left eye: No nystagmus.      Conjunctiva/sclera: Conjunctivae normal.      Pupils: Pupils are equal, round, and reactive to light.   Cardiovascular:      Rate and Rhythm: Normal rate and regular rhythm.      Pulses: Normal pulses.      Heart sounds: Normal heart sounds. No murmur heard.     Pulmonary:      Effort: Pulmonary effort is normal. No respiratory distress.      Breath sounds: Normal breath sounds. No wheezing.   Abdominal:      General: Bowel sounds are normal. There is no distension.      Palpations: Abdomen is soft.      Tenderness: There is no abdominal tenderness.   Musculoskeletal:         General: Normal range of motion.      Cervical back: Normal range of motion. No rigidity or tenderness.      Right lower leg: No edema.      Left lower leg: No edema.   Lymphadenopathy:      Cervical: No cervical adenopathy.   Skin:     General: Skin is warm.   Neurological:      General: No focal deficit present.      Mental Status: She is alert and oriented to person, place, and time.      Motor: No weakness.      Gait: Gait normal.   Psychiatric:         Mood and Affect: Mood normal.         Behavior: Behavior normal.        Result Review :     Common labs    Common Labsle 11/18/20 11/18/20 11/18/20 11/18/20    1330 1330 1330 1330   Glucose  91     BUN  13     Creatinine  0.72     eGFR Non  Am  80     eGFR African Am  97     Sodium  134 (A)     Potassium  4.6     Chloride  95 (A)     Calcium  9.7     Total Protein  6.8     Albumin  4.50     Total Bilirubin  0.4      Alkaline Phosphatase  62     AST (SGOT)  21     ALT (SGPT)  17     WBC   5.60    Hemoglobin   14.6    Hematocrit   43.9    Platelets   244    Total Cholesterol 223 (A)      Triglycerides 51      HDL Cholesterol 90 (A)      LDL Cholesterol  124 (A)      Hemoglobin A1C    5.30   (A) Abnormal value       Comments are available for some flowsheets but are not being displayed.           Data reviewed: last office visit note and recent lab work          Assessment and Plan    Diagnoses and all orders for this visit:    1. Dizziness (Primary)  -     Comprehensive metabolic panel  -     Ambulatory Referral to Cardiology    2. Anxiety  -     busPIRone (BUSPAR) 5 MG tablet; Take 1 tablet by mouth 2 (Two) Times a Day.  Dispense: 60 tablet; Refill: 0    3. Mitral valve prolapse  -     Ambulatory Referral to Cardiology    4. Environmental allergies    5. Pure hypercholesterolemia       1. Dizziness: Consider addition to dehydration, electrolyte abnormality, inner ear disorder, or cardiac abnormality.  Orthostatic blood pressures performed and were normal.  Assessment of inner ears normal bilaterally.  Educated patient to increase fluid intake to at least 8 glasses of water a day and to increase fluid intake when exercising.  Encouraged patient to try Gatorade after physical activity.  Patient also encouraged to keep a log of when the dizziness occurs and what activities are being performed, the time of day, associated symptoms, and length of episodes.  Patient is going to bring this log to her follow-up appointment in 4 weeks. Will order CMP today. If patient's symptoms continue, will consider further work-up.   2. Anxiety: We will start patient on BuSpar and follow-up in 4 weeks.  Patient is to call if she experiences any negative side effects from this medication or if her symptoms do not improve.  3. Hyperlipidemia: Continue intake of lean meats, vegetables, and fruits.  Limit fried foods, saturated fats, and added sugars.   Discussed decreasing cheese intake to only 3-4 times a week and decreasing wine intake to only 1 glass of wine 3-4 times a week.  We will recheck lipid panel prior to next visit as she is not fasting today.   4. Mitral valve prolapse and systolic dysfunction: Patient currently does not follow with cardiologist.  Will place consult today.  5. Environmental allergies: Continue Zyrtec and Flonase.    I spent 30 minutes caring for Verito on this date of service. This time includes time spent by me in the following activities:reviewing tests, obtaining and/or reviewing a separately obtained history, performing a medically appropriate examination and/or evaluation , counseling and educating the patient/family/caregiver and ordering medications, tests, or procedures  Follow Up     Return in about 4 weeks (around 7/23/2021) for Recheck, morning appointment for fasting lab work .  Patient was given instructions and counseling regarding her condition or for health maintenance advice. Please see specific information pulled into the AVS if appropriate.     Electronically signed by MICHELLE Hidalgo, 06/25/21, 12:06 PM EDT.

## 2021-06-25 ENCOUNTER — TELEPHONE (OUTPATIENT)
Dept: FAMILY MEDICINE CLINIC | Facility: CLINIC | Age: 71
End: 2021-06-25

## 2021-06-25 ENCOUNTER — OFFICE VISIT (OUTPATIENT)
Dept: FAMILY MEDICINE CLINIC | Facility: CLINIC | Age: 71
End: 2021-06-25

## 2021-06-25 VITALS
WEIGHT: 177 LBS | TEMPERATURE: 97.7 F | HEART RATE: 69 BPM | DIASTOLIC BLOOD PRESSURE: 74 MMHG | SYSTOLIC BLOOD PRESSURE: 118 MMHG | HEIGHT: 63 IN | BODY MASS INDEX: 31.36 KG/M2 | OXYGEN SATURATION: 98 %

## 2021-06-25 DIAGNOSIS — F41.9 ANXIETY: ICD-10-CM

## 2021-06-25 DIAGNOSIS — Z91.09 ENVIRONMENTAL ALLERGIES: ICD-10-CM

## 2021-06-25 DIAGNOSIS — I34.1 MITRAL VALVE PROLAPSE: ICD-10-CM

## 2021-06-25 DIAGNOSIS — R42 DIZZINESS: Primary | ICD-10-CM

## 2021-06-25 DIAGNOSIS — E78.00 PURE HYPERCHOLESTEROLEMIA: ICD-10-CM

## 2021-06-25 PROCEDURE — 99214 OFFICE O/P EST MOD 30 MIN: CPT | Performed by: NURSE PRACTITIONER

## 2021-06-25 RX ORDER — BUSPIRONE HYDROCHLORIDE 5 MG/1
5 TABLET ORAL 2 TIMES DAILY
Qty: 60 TABLET | Refills: 0 | Status: SHIPPED | OUTPATIENT
Start: 2021-06-25 | End: 2021-08-05 | Stop reason: SDUPTHER

## 2021-06-25 NOTE — TELEPHONE ENCOUNTER
Caller: Verito العراقي    Relationship: Self    Best call back number: [phone]    Equipment requested: BLOOD PRESSURE CUFF    Reason for the request: BLOOD PRESSURE TO MONITOR    Prescribing Provider: PIYUSH HARRINGTON    Additional information or concerns: PATIENT FORGOT TO GET BEFORE LEAVING THE OFFICE.  PLEASE ADVISE IF HER  CAN PICK THIS UP TODAY?

## 2021-06-26 LAB
ALBUMIN SERPL-MCNC: 4.7 G/DL (ref 3.5–5.2)
ALBUMIN/GLOB SERPL: 1.7 G/DL
ALP SERPL-CCNC: 61 U/L (ref 39–117)
ALT SERPL-CCNC: 20 U/L (ref 1–33)
AST SERPL-CCNC: 19 U/L (ref 1–32)
BILIRUB SERPL-MCNC: 0.4 MG/DL (ref 0–1.2)
BUN SERPL-MCNC: 11 MG/DL (ref 8–23)
BUN/CREAT SERPL: 14.9 (ref 7–25)
CALCIUM SERPL-MCNC: 10.2 MG/DL (ref 8.6–10.5)
CHLORIDE SERPL-SCNC: 100 MMOL/L (ref 98–107)
CO2 SERPL-SCNC: 28 MMOL/L (ref 22–29)
CREAT SERPL-MCNC: 0.74 MG/DL (ref 0.57–1)
GLOBULIN SER CALC-MCNC: 2.7 GM/DL
GLUCOSE SERPL-MCNC: 102 MG/DL (ref 65–99)
POTASSIUM SERPL-SCNC: 4.8 MMOL/L (ref 3.5–5.2)
PROT SERPL-MCNC: 7.4 G/DL (ref 6–8.5)
SODIUM SERPL-SCNC: 138 MMOL/L (ref 136–145)

## 2021-07-09 ENCOUNTER — TELEPHONE (OUTPATIENT)
Dept: FAMILY MEDICINE CLINIC | Facility: CLINIC | Age: 71
End: 2021-07-09

## 2021-07-09 NOTE — TELEPHONE ENCOUNTER
Caller: Verito العراقي    Relationship: Self    Best call back number: [phone]     What is the best time to reach you: ANY     Who are you requesting to speak with (clinical staff, provider,  specific staff member): PIYUSH HARRINGTON     Do you know the name of the person who called: N/A     What was the call regarding: PATIENT STATED SHE GOT A CALL FROM CARDOLOGY  ABOUT A TEST. PATIENT DECLINED TEST AT THE TIME AND DIDN'T UNDERSTAND WHY SHE NEEDS IT AND IS WILLING TO GET IT DONE AT A  LATER DATE.     Do you require a callback: NO

## 2021-07-30 ENCOUNTER — OFFICE VISIT (OUTPATIENT)
Dept: FAMILY MEDICINE CLINIC | Facility: CLINIC | Age: 71
End: 2021-07-30

## 2021-07-30 VITALS
OXYGEN SATURATION: 98 % | HEART RATE: 76 BPM | TEMPERATURE: 97.1 F | SYSTOLIC BLOOD PRESSURE: 124 MMHG | HEIGHT: 62 IN | WEIGHT: 176.4 LBS | DIASTOLIC BLOOD PRESSURE: 82 MMHG | BODY MASS INDEX: 32.46 KG/M2 | RESPIRATION RATE: 16 BRPM

## 2021-07-30 DIAGNOSIS — F41.8 ANXIETY ASSOCIATED WITH DEPRESSION: Primary | ICD-10-CM

## 2021-07-30 PROCEDURE — 99212 OFFICE O/P EST SF 10 MIN: CPT | Performed by: NURSE PRACTITIONER

## 2021-07-30 NOTE — PROGRESS NOTES
"Chief Complaint  Establish Care (Follow up, for vertigo, needs refill on Buspar.)    Subjective          Verito العراقي presents to [facility]  History of Present Illness  Verito العراقي is a 71 y.o. female who presents to the clinic today for fasting labs and to follow up on complaints of anxiety as well as vertigo.    · Dizziness: Patient was seen on 6/25/2021 and complained of dizziness.  CMP was obtained at that visit and was normal.  At that time, patient was going on walks in the heat and was not bringing water on her walks.  Since her appointment, she has been taking water with her on her walks and her symptoms have greatly improved.  She denies dizziness and light headedness today.  Blood pressure heart rate is within normal limits.  Patient was referred to cardiology on her last visit for history of mitral valve prolapse and diastolic dysfunction.  Patient plans to follow-up with cardiology in 1 to 2 months.    · Anxiety: Her symptoms are much improved on buspirone.  She would like to try the medication at this dose for a little longer. She has noticed she is more relaxed on this medication.  She denies feelings of anxiety today.    Review of Systems   Constitutional: Negative.    Eyes: Negative.    Respiratory: Negative.    Cardiovascular: Negative.    Gastrointestinal: Negative.    Musculoskeletal: Negative.    Skin: Negative.    Neurological: Negative.    Psychiatric/Behavioral: Negative.       Objective   Vital Signs:   /82   Pulse 76   Temp 97.1 °F (36.2 °C)   Resp 16   Ht 157.5 cm (62\")   Wt 80 kg (176 lb 6.4 oz)   SpO2 98%   BMI 32.26 kg/m²     Physical Exam  Constitutional:       General: She is not in acute distress.     Appearance: She is not ill-appearing, toxic-appearing or diaphoretic.   HENT:      Head: Normocephalic and atraumatic.   Eyes:      General: No scleral icterus.        Right eye: No discharge.         Left eye: No discharge.      " Extraocular Movements: Extraocular movements intact.   Cardiovascular:      Rate and Rhythm: Normal rate and regular rhythm.      Pulses: Normal pulses.      Heart sounds: Normal heart sounds. No murmur heard.   No friction rub.   Pulmonary:      Effort: Pulmonary effort is normal. No respiratory distress.      Breath sounds: Normal breath sounds. No stridor. No rhonchi.   Skin:     General: Skin is warm.   Neurological:      General: No focal deficit present.      Mental Status: She is alert and oriented to person, place, and time.      Motor: No weakness.      Gait: Gait normal.   Psychiatric:         Mood and Affect: Mood normal.         Behavior: Behavior normal.        Result Review :     CMP    CMP 11/18/20 6/25/21   Glucose 91 102 (A)   BUN 13 11   Creatinine 0.72 0.74   eGFR Non  Am 80 77   eGFR African Am 97 94   Sodium 134 (A) 138   Potassium 4.6 4.8   Chloride 95 (A) 100   Calcium 9.7 10.2   Total Protein 6.8 7.4   Albumin 4.50 4.70   Globulin 2.3 2.7   Total Bilirubin 0.4 0.4   Alkaline Phosphatase 62 61   AST (SGOT) 21 19   ALT (SGPT) 17 20   (A) Abnormal value       Comments are available for some flowsheets but are not being displayed.                    Assessment and Plan    Diagnoses and all orders for this visit:    1. Anxiety associated with depression (Primary)      Patient is a pleasant 71-year-old female.  Patient reports her symptoms have much improved since her last appointment.  She is working to stay hydrated and this has improved her dizziness.  Patient was also started on buspirone for anxiety on her last visit.  Patient is happy with her current dose and does not want to increase the dose of her medication today.       Follow Up   Return in about 4 months (around 11/30/2021) for Annual physical- medicare wellness, early appt for fasting labs.  Patient was given instructions and counseling regarding her condition or for health maintenance advice. Please see specific information  pulled into the AVS if appropriate.       Electronically signed by MICHELLE Hidalgo, 07/30/21, 10:49 AM EDT.

## 2021-08-05 DIAGNOSIS — F41.9 ANXIETY: ICD-10-CM

## 2021-08-05 NOTE — TELEPHONE ENCOUNTER
Caller: Verito العراقي    Relationship: Self    Best call back number:[phone]   Medication needed:   Requested Prescriptions     Pending Prescriptions Disp Refills   • busPIRone (BUSPAR) 5 MG tablet 60 tablet 0     Sig: Take 1 tablet by mouth 2 (Two) Times a Day.       When do you need the refill by: TODAY    What additional details did the patient provide when requesting the medication: PATIENT NEEDS FILLED TODAY PLEASE     Does the patient have less than a 3 day supply:  [x] Yes  [] No    What is the patient's preferred pharmacy: [facility] [address] [place] [address] [address] [facility] [address] - [phone]  - [phone] FX

## 2021-08-06 RX ORDER — BUSPIRONE HYDROCHLORIDE 5 MG/1
5 TABLET ORAL 2 TIMES DAILY
Qty: 60 TABLET | Refills: 0 | Status: SHIPPED | OUTPATIENT
Start: 2021-08-06 | End: 2021-08-10 | Stop reason: SDUPTHER

## 2021-08-10 DIAGNOSIS — F41.9 ANXIETY: ICD-10-CM

## 2021-08-10 NOTE — TELEPHONE ENCOUNTER
Caller: Verito العراقي    Relationship: Self    Best call back number: [phone]     Medication needed:   Requested Prescriptions     Pending Prescriptions Disp Refills   • busPIRone (BUSPAR) 5 MG tablet 60 tablet 0     Sig: Take 1 tablet by mouth 2 (Two) Times a Day.       When do you need the refill by: ASAP    What additional details did the patient provide when requesting the medication:        PATIENT IS GOING OUT OF TOWN IN 2 WEEKS, AND NEEDS THIS REFILLED SO SHELL HAVE A BOTTLE WITH HER TO TAKE,    THE SCRIPT HAS 0 REFILLS ON IT. MAY NEED A NEW SCRIPT WROTE. IF PATIENT IS IN NEED OF AN APPOINTMENT PLEASE CALL AND ADVISE (LAST OV 07/30/21)    PLEASE CALL AND ADVISE PATIENT     Does the patient have less than a 3 day supply:  [x] Yes  [] No    What is the patient's preferred pharmacy: [facility] [address] [place] [address] [id number] AT [facility] [address] - [phone]  - [phone] FX

## 2021-08-10 NOTE — TELEPHONE ENCOUNTER
Rx Refill Note  Requested Prescriptions     Pending Prescriptions Disp Refills   • busPIRone (BUSPAR) 5 MG tablet 60 tablet 0     Sig: Take 1 tablet by mouth 2 (Two) Times a Day.      Last office visit with prescribing clinician: 7/30/2021      Next office visit with prescribing clinician: 11/29/2021            Yue Rollins MA  08/10/21, 15:21 EDT

## 2021-08-11 RX ORDER — BUSPIRONE HYDROCHLORIDE 5 MG/1
5 TABLET ORAL 2 TIMES DAILY
Qty: 60 TABLET | Refills: 3 | Status: SHIPPED | OUTPATIENT
Start: 2021-08-11 | End: 2021-11-29 | Stop reason: SDUPTHER

## 2021-08-12 NOTE — PROGRESS NOTES
RM:________    Referral Provider: Christa Herr APRN Jolly, Kalynn D, APRN    NEW PATIENT/ CONSULT  PREVIOUS CARDIOLOGIST:   CARDIAC TESTING:     : 1950   AGE: 71 y.o.    2021  REASON FOR VISIT/  CC:      WT: ____________ BP: __________L __________R/ HR_______________    CHEST PAIN: _____________    SOA: ____________PALPS: __________      LIGHTHEADED: ___________ FATIGUE: _______________ EDEMA______________  ALLERGIES:  Ciprofloxacin  SMOKING HISTORY  Social History     Tobacco Use   • Smoking status: Never Smoker   • Smokeless tobacco: Never Used   Substance Use Topics   • Alcohol use: Yes     Alcohol/week: 7.0 standard drinks     Types: 7 Glasses of wine per week     Comment: social   • Drug use: No          CHILDREN: _______________________       CAFFEINE USE________  ALCOHOL _____________  OCCUPATION_____________  Past Surgical History:   Procedure Laterality Date   • COLONOSCOPY W/ POLYPECTOMY  10/31/2017    , ascending colon 3 mm 10/31/2017, sigmoid colon (3mm) - hyperplastic polyp  2012                FAMILY HISTORY  HEART DISEASE  CHF  DIABETES  CARDIAC ARREST  STROKE  CANCER  ANEURYSM                                                             H/O: MI_____   STROKE________   GOUT_____   ANEMIA______     CAROTID________ HIV____ CAD_______ HYPERCHOL _____    H/O: CHF _____   RF____ DM___ HTN_______PVD____THYROID DISEASE_______    PMH: GI ____   HEPATITIS ___ KIDNEY DISEASE ___ LUNG DISEASE _______     SLEEP APNEA ____ BLOOD CLOTS ____ DVT ____ VEIN STRIPPING ___________     CANCER _________________________________ CHEMO/ RADIATION__________

## 2021-08-18 ENCOUNTER — OFFICE VISIT (OUTPATIENT)
Dept: CARDIOLOGY | Facility: CLINIC | Age: 71
End: 2021-08-18

## 2021-08-18 VITALS
SYSTOLIC BLOOD PRESSURE: 120 MMHG | BODY MASS INDEX: 32.76 KG/M2 | HEIGHT: 62 IN | DIASTOLIC BLOOD PRESSURE: 72 MMHG | WEIGHT: 178 LBS | HEART RATE: 74 BPM

## 2021-08-18 DIAGNOSIS — I51.89 GRADE I DIASTOLIC DYSFUNCTION: Primary | ICD-10-CM

## 2021-08-18 DIAGNOSIS — Z86.79 HISTORY OF MITRAL VALVE PROLAPSE: ICD-10-CM

## 2021-08-18 DIAGNOSIS — R42 DIZZINESS: ICD-10-CM

## 2021-08-18 PROCEDURE — 99204 OFFICE O/P NEW MOD 45 MIN: CPT | Performed by: INTERNAL MEDICINE

## 2021-08-18 PROCEDURE — 93000 ELECTROCARDIOGRAM COMPLETE: CPT | Performed by: INTERNAL MEDICINE

## 2021-08-18 NOTE — PROGRESS NOTES
Date of Office Visit: 21  Encounter Provider: Kirby Hawkins MD  Place of Service: [facility] CARDIOLOGY  Patient Name: Verito العراقي  :1950    Chief Complaint   Patient presents with   • Establish Care   :     HPI:     Ms. العراقي is 71 y.o. and presents today for evaluation of echo findings in 2016.    She has no history of hypertension.  Her past medical history includes hyperlipidemia, but her last HDL was 90, and her LDL was 124, which is within normal limits.  She was told 40 years ago that she had mitral valve prolapse by her gynecologist at the time.  She had an echocardiogram in our office in 2016 that revealed normal left ventricular systolic function, normal mitral valve morphology and function, and grade 1 diastolic dysfunction.    She gained 10 pounds during the pandemic and has been walking.  When it is really hot and humid, she would notice a little bit of lightheadedness.  She started carrying water with her and drinking water, and that symptom resolved.  She has not had any chest pain, shortness of breath, leg swelling, lightheadedness outside of these episodes, or syncope.    Past Medical History:   Diagnosis Date   • Acute conjunctivitis    • Allergic rhinitis    • Colon polyp 10/31/2017    , ascending colon tubular adenoma   • Hemorrhoids    • History of bone density study 2011    nl   • History of bone density study     nl 2017   • History of mitral valve prolapse     she was told this in the psat, but she had a normal echo in 2016   • Hyperplastic colon polyp      hyperplastic rectal and colon polyps,    • Pregnancy            Past Surgical History:   Procedure Laterality Date   • COLONOSCOPY W/ POLYPECTOMY  10/31/2017    , ascending colon 3 mm 10/31/2017, sigmoid colon (3mm) - hyperplastic polyp  2012       Social History     Socioeconomic History   • Marital status:      Spouse name: Not on file   •  Number of children: 3   • Years of education: Not on file   • Highest education level: Not on file   Tobacco Use   • Smoking status: Never Smoker   • Smokeless tobacco: Never Used   Vaping Use   • Vaping Use: Never used   Substance and Sexual Activity   • Alcohol use: Yes     Alcohol/week: 7.0 standard drinks     Types: 7 Glasses of wine per week     Comment: social   • Drug use: No   • Sexual activity: Defer       Family History   Problem Relation Age of Onset   • Macular degeneration Mother    • Hypertension Mother    • Breast cancer Mother    • Dementia Mother    • Diabetes Father    • Heart disease Father    • Prostate cancer Father    • Heart disease Maternal Grandmother    • Heart disease Paternal Grandfather        Review of Systems   All other systems reviewed and are negative.      Allergies   Allergen Reactions   • Ciprofloxacin          Current Outpatient Medications:   •  albuterol sulfate  (90 Base) MCG/ACT inhaler, INHALE TWO PUFFS BY MOUTH EVERY 4 HOURS AS NEEDED FOR WHEEZING, Disp: 1 inhaler, Rfl: 0  •  busPIRone (BUSPAR) 5 MG tablet, Take 1 tablet by mouth 2 (Two) Times a Day., Disp: 60 tablet, Rfl: 3  •  calcium carbonate-vitamin d 600-400 MG-UNIT per tablet, Take 1 tablet by mouth daily as needed., Disp: , Rfl:   •  Cetirizine HCl (ZYRTEC PO), Take 1 tablet by mouth daily., Disp: , Rfl:   •  Flaxseed, Linseed, (FLAX SEED OIL) 1300 MG capsule, Take by mouth., Disp: , Rfl:   •  fluticasone (FLONASE) 50 MCG/ACT nasal spray, 2 sprays by Each Nare route Daily., Disp: 16 mL, Rfl: 9  •  glucosamine-chondroitin 500-400 MG capsule capsule, Take 2 capsules by mouth daily., Disp: , Rfl:   •  Multiple Vitamins tablet, Take by mouth., Disp: , Rfl:   •  vitamin C (ASCORBIC ACID) 500 MG tablet, Take 1 tablet by mouth daily., Disp: , Rfl:   •  vitamin E 400 UNIT capsule, Take 1 capsule by mouth daily., Disp: , Rfl:       Objective:     Vitals:    08/18/21 1144   BP: 120/72   Pulse: 74   Weight: 80.7 kg  "(178 lb)   Height: 157.5 cm (62\")     Body mass index is 32.56 kg/m².    Vitals reviewed.   Constitutional:       Appearance: Healthy appearance. Well-developed and not in distress.   Eyes:      Conjunctiva/sclera: Conjunctivae normal.   HENT:      Head: Normocephalic.      Nose: Nose normal.         Comments: masked  Neck:      Vascular: No JVD. JVD normal.      Lymphadenopathy: No cervical adenopathy.   Pulmonary:      Effort: Pulmonary effort is normal.      Breath sounds: Normal breath sounds.   Cardiovascular:      Normal rate. Regular rhythm.      Murmurs: There is no murmur.   Pulses:     Intact distal pulses.   Edema:     Peripheral edema absent.   Abdominal:      Palpations: Abdomen is soft.      Tenderness: There is no abdominal tenderness.   Musculoskeletal: Normal range of motion.      Cervical back: Normal range of motion. Skin:     General: Skin is warm and dry.      Findings: No rash.   Neurological:      General: No focal deficit present.      Mental Status: Alert, oriented to person, place, and time and oriented to person, place and time.      Cranial Nerves: No cranial nerve deficit.   Psychiatric:         Behavior: Behavior normal.         Thought Content: Thought content normal.         Judgment: Judgment normal.           ECG 12 Lead    Date/Time: 8/18/2021 12:02 PM  Performed by: Kirby Hawkins MD  Authorized by: Kirby Hawkins MD   Comparison: compared with previous ECG   Similar to previous ECG  Rhythm: sinus rhythm  Conduction: conduction normal  ST Segments: ST segments normal  T Waves: T waves normal  QRS axis: normal  Other findings: poor R wave progression    Clinical impression: non-specific ECG              Assessment:       Diagnosis Plan   1. Grade I diastolic dysfunction     2. Dizziness     3. History of mitral valve prolapse          Plan:       She and I had a good visit today.  All in all, she is doing well.  She had some very mild lightheadedness while out walking in extreme " "heat and humidity, but now that the weather is more temperate and she is hydrating better, this symptom has resolved.  She has no other \"red flag\" cardiac symptoms.  Grade 1 diastolic dysfunction is a normal finding at 71 years of age.  The last echo did not show any evidence of mitral valve prolapse.  I reviewed the images myself and her mitral valve is normal.  She has no murmur.  She does not need any cardiac testing at this time, and she can follow-up with us on an as-needed basis.    Sincerely,       Kirby Hawkins MD                "

## 2021-11-04 ENCOUNTER — TELEPHONE (OUTPATIENT)
Dept: FAMILY MEDICINE CLINIC | Facility: CLINIC | Age: 71
End: 2021-11-04

## 2021-11-04 DIAGNOSIS — J30.1 SEASONAL ALLERGIC RHINITIS DUE TO POLLEN: ICD-10-CM

## 2021-11-04 RX ORDER — FLUTICASONE PROPIONATE 50 MCG
2 SPRAY, SUSPENSION (ML) NASAL DAILY
Qty: 16 ML | Refills: 11 | Status: SHIPPED | OUTPATIENT
Start: 2021-11-04

## 2021-11-04 NOTE — TELEPHONE ENCOUNTER
Caller: Verito العراقي    Relationship: Self      Requested Prescriptions:       fluticasone (FLONASE) 50 MCG/ACT nasal spray         Pharmacy where request should be sent:     [facility] [address] [place] [address] [facility] [address] - [phone] PH - [phone] FX  [phone]  Additional details provided by patient: PATIENT IS CALLING TO CHECK THE STATUS OF HER REQUEST FOR A NEW PRESCRIPTION WITH REFILLS OF THE ABOVE MEDICATION.  SHE STATES [facility] TOLD HER THEY HAD REQUESTED IT ON Monday.    Best call back number: [phone]     Does the patient have less than a 3 day supply:  [x] Yes  [] No    Kristie Burnette, Alexiaed Rep   11/04/21 10:07 EDT       PLEASE ADVISE.

## 2021-11-10 DIAGNOSIS — E55.9 HYPOVITAMINOSIS D: ICD-10-CM

## 2021-11-10 DIAGNOSIS — E78.00 PURE HYPERCHOLESTEROLEMIA: ICD-10-CM

## 2021-11-10 DIAGNOSIS — Z91.09 ENVIRONMENTAL ALLERGIES: ICD-10-CM

## 2021-11-10 DIAGNOSIS — R73.01 IMPAIRED FASTING BLOOD SUGAR: Primary | ICD-10-CM

## 2021-11-10 DIAGNOSIS — Z13.0 SCREENING FOR DEFICIENCY ANEMIA: ICD-10-CM

## 2021-11-15 DIAGNOSIS — Z13.0 SCREENING FOR DEFICIENCY ANEMIA: ICD-10-CM

## 2021-11-15 DIAGNOSIS — R73.01 IMPAIRED FASTING BLOOD SUGAR: ICD-10-CM

## 2021-11-15 DIAGNOSIS — E55.9 HYPOVITAMINOSIS D: ICD-10-CM

## 2021-11-15 DIAGNOSIS — Z00.00 HEALTH CARE MAINTENANCE: ICD-10-CM

## 2021-11-15 DIAGNOSIS — E78.00 PURE HYPERCHOLESTEROLEMIA: ICD-10-CM

## 2021-11-23 LAB
25(OH)D3+25(OH)D2 SERPL-MCNC: 49.8 NG/ML (ref 30–100)
ALBUMIN SERPL-MCNC: 4.4 G/DL (ref 3.7–4.7)
ALBUMIN/GLOB SERPL: 1.8 {RATIO} (ref 1.2–2.2)
ALP SERPL-CCNC: 67 IU/L (ref 44–121)
ALT SERPL-CCNC: 18 IU/L (ref 0–32)
AST SERPL-CCNC: 18 IU/L (ref 0–40)
BASOPHILS # BLD AUTO: 0.1 X10E3/UL (ref 0–0.2)
BASOPHILS NFR BLD AUTO: 1 %
BILIRUB SERPL-MCNC: 0.4 MG/DL (ref 0–1.2)
BUN SERPL-MCNC: 13 MG/DL (ref 8–27)
BUN/CREAT SERPL: 17 (ref 12–28)
CALCIUM SERPL-MCNC: 9.6 MG/DL (ref 8.7–10.3)
CHLORIDE SERPL-SCNC: 101 MMOL/L (ref 96–106)
CHOLEST SERPL-MCNC: 236 MG/DL (ref 100–199)
CO2 SERPL-SCNC: 22 MMOL/L (ref 20–29)
CREAT SERPL-MCNC: 0.77 MG/DL (ref 0.57–1)
EOSINOPHIL # BLD AUTO: 0.1 X10E3/UL (ref 0–0.4)
EOSINOPHIL NFR BLD AUTO: 3 %
ERYTHROCYTE [DISTWIDTH] IN BLOOD BY AUTOMATED COUNT: 12.7 % (ref 11.7–15.4)
GLOBULIN SER CALC-MCNC: 2.4 G/DL (ref 1.5–4.5)
GLUCOSE SERPL-MCNC: 99 MG/DL (ref 65–99)
HBA1C MFR BLD: 5.4 % (ref 4.8–5.6)
HCT VFR BLD AUTO: 45.1 % (ref 34–46.6)
HDLC SERPL-MCNC: 71 MG/DL
HGB BLD-MCNC: 15 G/DL (ref 11.1–15.9)
IMM GRANULOCYTES # BLD AUTO: 0 X10E3/UL (ref 0–0.1)
IMM GRANULOCYTES NFR BLD AUTO: 0 %
LDLC SERPL CALC-MCNC: 152 MG/DL (ref 0–99)
LYMPHOCYTES # BLD AUTO: 2 X10E3/UL (ref 0.7–3.1)
LYMPHOCYTES NFR BLD AUTO: 40 %
MCH RBC QN AUTO: 30.5 PG (ref 26.6–33)
MCHC RBC AUTO-ENTMCNC: 33.3 G/DL (ref 31.5–35.7)
MCV RBC AUTO: 92 FL (ref 79–97)
MONOCYTES # BLD AUTO: 0.5 X10E3/UL (ref 0.1–0.9)
MONOCYTES NFR BLD AUTO: 10 %
NEUTROPHILS # BLD AUTO: 2.3 X10E3/UL (ref 1.4–7)
NEUTROPHILS NFR BLD AUTO: 46 %
PLATELET # BLD AUTO: 262 X10E3/UL (ref 150–450)
POTASSIUM SERPL-SCNC: 4.4 MMOL/L (ref 3.5–5.2)
PROT SERPL-MCNC: 6.8 G/DL (ref 6–8.5)
RBC # BLD AUTO: 4.92 X10E6/UL (ref 3.77–5.28)
SODIUM SERPL-SCNC: 140 MMOL/L (ref 134–144)
TRIGL SERPL-MCNC: 77 MG/DL (ref 0–149)
VLDLC SERPL CALC-MCNC: 13 MG/DL (ref 5–40)
WBC # BLD AUTO: 4.9 X10E3/UL (ref 3.4–10.8)

## 2021-11-29 ENCOUNTER — OFFICE VISIT (OUTPATIENT)
Dept: FAMILY MEDICINE CLINIC | Facility: CLINIC | Age: 71
End: 2021-11-29

## 2021-11-29 VITALS
OXYGEN SATURATION: 98 % | SYSTOLIC BLOOD PRESSURE: 114 MMHG | HEIGHT: 62 IN | TEMPERATURE: 98 F | HEART RATE: 85 BPM | BODY MASS INDEX: 32.2 KG/M2 | DIASTOLIC BLOOD PRESSURE: 76 MMHG | WEIGHT: 175 LBS

## 2021-11-29 DIAGNOSIS — E78.00 PURE HYPERCHOLESTEROLEMIA: ICD-10-CM

## 2021-11-29 DIAGNOSIS — F41.9 ANXIETY: ICD-10-CM

## 2021-11-29 DIAGNOSIS — Z00.00 MEDICARE ANNUAL WELLNESS VISIT, SUBSEQUENT: Primary | ICD-10-CM

## 2021-11-29 PROCEDURE — 1159F MED LIST DOCD IN RCRD: CPT | Performed by: NURSE PRACTITIONER

## 2021-11-29 PROCEDURE — 1170F FXNL STATUS ASSESSED: CPT | Performed by: NURSE PRACTITIONER

## 2021-11-29 PROCEDURE — G0439 PPPS, SUBSEQ VISIT: HCPCS | Performed by: NURSE PRACTITIONER

## 2021-11-29 RX ORDER — ATORVASTATIN CALCIUM 10 MG/1
10 TABLET, FILM COATED ORAL DAILY
Qty: 30 TABLET | Refills: 1 | Status: SHIPPED | OUTPATIENT
Start: 2021-11-29 | End: 2022-02-28 | Stop reason: SDDI

## 2021-11-29 RX ORDER — BUSPIRONE HYDROCHLORIDE 5 MG/1
5 TABLET ORAL 2 TIMES DAILY
Qty: 180 TABLET | Refills: 1 | Status: SHIPPED | OUTPATIENT
Start: 2021-11-29 | End: 2022-02-28 | Stop reason: SDUPTHER

## 2021-11-29 NOTE — PROGRESS NOTES
The ABCs of the Annual Wellness Visit  Subsequent Medicare Wellness Visit  Masks/face shield/appropriate PPE were worn for the entirety of the visit by the patient, MA, and provider.     Chief Complaint   Patient presents with   • Medicare Wellness-subsequent     SMW - labs done 11/22 has upcoming gyn/mammo apt in Dec      Subjective    History of Present Illness:  Verito العراقي is a 71 y.o. female who presents for a Subsequent Medicare Wellness Visit.  Patient has a medical history significant for anxiety, environmental allergies, hyperlipidemia, and mitral valve prolapse and diastolic dysfunction.  Patient was evaluated by a cardiologist in August of this year and determined to follow-up on an as-needed basis.  Patient is asymptomatic today.  Patient does request refill for Buspar today.     The following portions of the patient's history were reviewed and   updated as appropriate: allergies, current medications, past family history, past medical history, past social history, past surgical history and problem list.    Compared to one year ago, the patient feels her physical   health is the same.    Compared to one year ago, the patient feels her mental   health is the same.    Recent Hospitalizations:  She was not admitted to the hospital during the last year.       Current Medical Providers:  Patient Care Team:  Christa Herr APRN as PCP - General (Nurse Practitioner)  Carli Rivera MD as Consulting Physician (Gynecology)    Outpatient Medications Prior to Visit   Medication Sig Dispense Refill   • albuterol sulfate  (90 Base) MCG/ACT inhaler INHALE TWO PUFFS BY MOUTH EVERY 4 HOURS AS NEEDED FOR WHEEZING 1 inhaler 0   • calcium carbonate-vitamin d 600-400 MG-UNIT per tablet Take 1 tablet by mouth daily as needed.     • Cetirizine HCl (ZYRTEC PO) Take 1 tablet by mouth daily.     • Flaxseed, Linseed, (FLAX SEED OIL) 1300 MG capsule Take by mouth.     • fluticasone (FLONASE) 50 MCG/ACT nasal spray 2  "sprays by Each Nare route Daily. 16 mL 11   • glucosamine-chondroitin 500-400 MG capsule capsule Take 2 capsules by mouth daily.     • Multiple Vitamins tablet Take by mouth.     • vitamin C (ASCORBIC ACID) 500 MG tablet Take 1 tablet by mouth daily.     • vitamin E 400 UNIT capsule Take 1 capsule by mouth daily.     • busPIRone (BUSPAR) 5 MG tablet Take 1 tablet by mouth 2 (Two) Times a Day. 60 tablet 3     No facility-administered medications prior to visit.       No opioid medication identified on active medication list. I have reviewed chart for other potential  high risk medication/s and harmful drug interactions in the elderly.          Aspirin is not on active medication list.  Aspirin use is not indicated based on review of current medical condition/s. Risk of harm outweighs potential benefits.  .    Patient Active Problem List   Diagnosis   • Health care maintenance   • Environmental allergies   • External hemorrhoid   • Tubular adenoma of colon   • Other fatigue   • Impaired fasting blood sugar   • Anxiety associated with depression     Advance Care Planning  Advance Directive is not on file.  ACP discussion was held with the patient during this visit. Patient has an advance directive (not in EMR), copy requested.    Review of Systems   Constitutional: Negative.    HENT: Negative.    Eyes: Negative.    Respiratory: Negative.    Cardiovascular: Negative.    Gastrointestinal: Negative.    Endocrine: Negative.    Genitourinary: Negative.    Musculoskeletal: Negative.    Skin: Negative.    Allergic/Immunologic: Negative.    Neurological: Negative.    Hematological: Negative.    Psychiatric/Behavioral: Negative.         Objective    Vitals:    11/29/21 0946   BP: 114/76   Pulse: 85   Temp: 98 °F (36.7 °C)   SpO2: 98%   Weight: 79.4 kg (175 lb)   Height: 157.5 cm (62\")     BMI Readings from Last 1 Encounters:   11/29/21 32.01 kg/m²   BMI is above normal parameters. Recommendations include: exercise counseling " and nutrition counseling.  Patient states she walks daily for exercise for about an hour at a time.  She rates her diet as decent.  She does not eat out much and does cook at home.  She cooks healthy foods that are typically meats and vegetables.  She mainly eats chicken and fish for protein.    Does the patient have evidence of cognitive impairment? No    Physical Exam  Vitals reviewed.   Constitutional:       General: She is not in acute distress.     Appearance: Normal appearance. She is well-developed. She is not ill-appearing, toxic-appearing or diaphoretic.   HENT:      Head: Normocephalic and atraumatic.      Right Ear: Tympanic membrane, ear canal and external ear normal.      Left Ear: Tympanic membrane, ear canal and external ear normal.   Eyes:      General: No scleral icterus.        Right eye: No discharge.         Left eye: No discharge.      Extraocular Movements: Extraocular movements intact.   Neck:      Thyroid: No thyroid mass, thyromegaly or thyroid tenderness.   Cardiovascular:      Rate and Rhythm: Normal rate and regular rhythm.      Heart sounds: Normal heart sounds.   Pulmonary:      Effort: Pulmonary effort is normal. No respiratory distress.      Breath sounds: Normal breath sounds. No stridor. No wheezing.   Abdominal:      General: Bowel sounds are normal. There is no distension.      Palpations: Abdomen is soft.      Tenderness: There is no abdominal tenderness.   Musculoskeletal:         General: Normal range of motion.      Cervical back: Normal range of motion and neck supple.      Right lower leg: No edema.      Left lower leg: No edema.   Skin:     General: Skin is warm.   Neurological:      General: No focal deficit present.      Mental Status: She is alert and oriented to person, place, and time.      Motor: No weakness.      Gait: Gait normal.   Psychiatric:         Mood and Affect: Mood normal.         Behavior: Behavior normal.       Lab Results   Component Value Date    CHLPL  236 (H) 11/22/2021    TRIG 77 11/22/2021    HDL 71 11/22/2021     (H) 11/22/2021    VLDL 13 11/22/2021    HGBA1C 5.4 11/22/2021            HEALTH RISK ASSESSMENT    Smoking Status:  Social History     Tobacco Use   Smoking Status Never Smoker   Smokeless Tobacco Never Used     Alcohol Consumption:  Social History     Substance and Sexual Activity   Alcohol Use Yes   • Alcohol/week: 7.0 standard drinks   • Types: 7 Glasses of wine per week    Comment: social     Fall Risk Screen:    STEADI Fall Risk Assessment has not been completed.    Depression Screening:  PHQ-2/PHQ-9 Depression Screening 11/29/2021   Little interest or pleasure in doing things 0   Feeling down, depressed, or hopeless 0   Total Score 0       Health Habits and Functional and Cognitive Screening:  Functional & Cognitive Status 11/29/2021   Do you have difficulty preparing food and eating? No   Do you have difficulty bathing yourself, getting dressed or grooming yourself? No   Do you have difficulty using the toilet? No   Do you have difficulty moving around from place to place? No   Do you have trouble with steps or getting out of a bed or a chair? No   Current Diet Well Balanced Diet   Dental Exam Up to date   Eye Exam Up to date   Exercise (times per week) 7 times per week   Current Exercises Include Walking   Do you need help using the phone?  No   Are you deaf or do you have serious difficulty hearing?  No   Do you need help with transportation? No   Do you need help shopping? No   Do you need help preparing meals?  No   Do you need help with housework?  No   Do you need help with laundry? No   Do you need help taking your medications? No   Do you need help managing money? No   Do you ever drive or ride in a car without wearing a seat belt? No   Have you felt unusual stress, anger or loneliness in the last month? No   Who do you live with? Spouse   If you need help, do you have trouble finding someone available to you? No   Have you been  bothered in the last four weeks by sexual problems? No   Do you have difficulty concentrating, remembering or making decisions? No       Age-appropriate Screening Schedule:  Refer to the list below for future screening recommendations based on patient's age, sex and/or medical conditions. Orders for these recommended tests are listed in the plan section. The patient has been provided with a written plan.    Health Maintenance   Topic Date Due   • ZOSTER VACCINE (2 of 2) 03/10/2010   • MAMMOGRAM  01/22/2021   • DXA SCAN  06/05/2022   • LIPID PANEL  11/22/2022   • TDAP/TD VACCINES (2 - Td or Tdap) 12/15/2024   • INFLUENZA VACCINE  Completed              Assessment/Plan   CMS Preventative Services Quick Reference  Risk Factors Identified During Encounter  Immunizations Discussed/Encouraged (specific Immunizations; Shingrix  Obesity/Overweight   The above risks/problems have been discussed with the patient.  Follow up actions/plans if indicated are seen below in the Assessment/Plan Section.  Pertinent information has been shared with the patient in the After Visit Summary.    Diagnoses and all orders for this visit:    1. Medicare annual wellness visit, subsequent (Primary)    2. Anxiety  -     busPIRone (BUSPAR) 5 MG tablet; Take 1 tablet by mouth 2 (Two) Times a Day.  Dispense: 180 tablet; Refill: 1      Patient is a pleasant 71-year-old female who presents today for her Medicare wellness visit.  Patient's vital signs are within normal limits today.  Patient did have lab work obtained on 11/22/2021 that was discussed today.  CMP showed normal glucose level, normal kidney function, normal liver function, normal electrolytes.  Patient's hemoglobin A1c was within normal limits.  Vitamin D level was normal.  CBC showed normal blood counts.  Patient's lipid panel did show elevated total cholesterol and elevated LDL that have increased from 1 year ago.  I did spend time discussing with patient today based on ASCVD risk  score, she is at increased risk for cardiovascular disease. The 10-year ASCVD risk score (Dallasmarina MURO Jr., et al., 2013) is: 8.5%    Values used to calculate the score:      Age: 71 years      Sex: Female      Is Non- : No      Diabetic: No      Tobacco smoker: No      Systolic Blood Pressure: 114 mmHg      Is BP treated: No      HDL Cholesterol: 71 mg/dL      Total Cholesterol: 236 mg/dL     Patient does have a family history of heart disease and hypertension. I did recommend starting patient on a statin medication.  Patient is hesitant to start this medication as she will be going on an extended vacation soon.  I did discuss with patient that I will order this medicine today and she can start this medication after her vacation.  We did review potential adverse effects of this medicine and to call if she develops any bilateral lower extremity pain.  I did discuss that this medicine can cause abnormalities in her liver function and her liver function will need to be rechecked 6 weeks after starting the statin medicine.  Patient was advised to take this medicine at night.  Patient verbalized understanding. Patient was encouraged to continue routine physical activity.  She should limit fried foods, saturated fats, red meats, and added sugars from her diet. Patient is scheduled to see her gynecologist in December and also has a mammogram planned for December of this year as well.  DXA scan due in June 2022.  Patient last had a colonoscopy in 2017.  Patient denies issues with her vision.  She does routinely follow with a dentist.  Patient is also unclear what shingles vaccine she has had in the past.  She is also unsure when she received this vaccine.  She believes she just had 1 dose.  She received the shingles vaccine at the [facility].  Patient was encouraged to reach out to [facility] to inquire which vaccine she received.  Patient denies any feelings of anxiety or depression today.  She  states she is doing well on her current dose of BuSpar, but would like to potentially talk about what alternative medications she could also try, but not today.  Patient states she is happy with her current dose of BuSpar.  We will plan to follow-up on anxiety at her next appointment.    Follow Up:   Return in about 2 months (around 1/29/2022) for Recheck, anxiety.  Call for any concerns or problems prior to next appointment.    An After Visit Summary and PPPS were made available to the patient.                 Electronically signed by MICHELLE Hidalgo, 11/29/21, 12:23 PM EST.

## 2021-12-20 ENCOUNTER — APPOINTMENT (OUTPATIENT)
Dept: WOMENS IMAGING | Facility: HOSPITAL | Age: 71
End: 2021-12-20

## 2021-12-20 PROCEDURE — 77063 BREAST TOMOSYNTHESIS BI: CPT | Performed by: RADIOLOGY

## 2021-12-20 PROCEDURE — 77067 SCR MAMMO BI INCL CAD: CPT | Performed by: RADIOLOGY

## 2022-02-02 ENCOUNTER — APPOINTMENT (OUTPATIENT)
Dept: WOMENS IMAGING | Facility: HOSPITAL | Age: 72
End: 2022-02-02

## 2022-02-02 PROCEDURE — G0279 TOMOSYNTHESIS, MAMMO: HCPCS | Performed by: RADIOLOGY

## 2022-02-02 PROCEDURE — 77065 DX MAMMO INCL CAD UNI: CPT | Performed by: RADIOLOGY

## 2022-02-28 ENCOUNTER — OFFICE VISIT (OUTPATIENT)
Dept: FAMILY MEDICINE CLINIC | Facility: CLINIC | Age: 72
End: 2022-02-28

## 2022-02-28 VITALS
OXYGEN SATURATION: 99 % | HEIGHT: 62 IN | DIASTOLIC BLOOD PRESSURE: 80 MMHG | HEART RATE: 84 BPM | BODY MASS INDEX: 32.68 KG/M2 | SYSTOLIC BLOOD PRESSURE: 138 MMHG | TEMPERATURE: 97.4 F | WEIGHT: 177.6 LBS

## 2022-02-28 DIAGNOSIS — F41.8 ANXIETY ASSOCIATED WITH DEPRESSION: Primary | ICD-10-CM

## 2022-02-28 PROCEDURE — 99214 OFFICE O/P EST MOD 30 MIN: CPT | Performed by: NURSE PRACTITIONER

## 2022-02-28 RX ORDER — BUSPIRONE HYDROCHLORIDE 5 MG/1
5 TABLET ORAL 3 TIMES DAILY
Qty: 270 TABLET | Refills: 0 | Status: SHIPPED | OUTPATIENT
Start: 2022-02-28 | End: 2022-06-02

## 2022-02-28 NOTE — PROGRESS NOTES
Chief Complaint  Anxiety (Patient is here for f/u on medication )    Subjective          Verito العراقي presents to [facility]  History of Present Illness  Verito العراقي is a 72 y.o. female who presents to the clinic today to follow-up on anxiety.     Anxiety: Patient is currently taking buspirone 5 mg 2 times a day. She feels like she is doing well on this medication at this dose in terms of anxiety, but she feels as if her depression is worsening.  Patient has depressive feelings in the morning with getting up and has trouble getting motivated for the day.  However, once she is up and moving, she feels okay.  She does describe feelings of being a little more apathetic and sometimes feelings of being overwhelmed, especially when her  talks about planning a new trip.  She denies suicidal thoughts or ideation.  Patient has taken Pristiq in the past, but did not like the way this medication made her feel.      PHQ-9 Depression Screening  Little interest or pleasure in doing things? 2   Feeling down, depressed, or hopeless? 0   Trouble falling or staying asleep, or sleeping too much? 0   Feeling tired or having little energy? 0   Poor appetite or overeating? 0   Feeling bad about yourself - or that you are a failure or have let yourself or your family down? 0   Trouble concentrating on things, such as reading the newspaper or watching television? 0   Moving or speaking so slowly that other people could have noticed? Or the opposite - being so fidgety or restless that you have been moving around a lot more than usual? 0   Thoughts that you would be better off dead, or of hurting yourself in some way? 0   PHQ-9 Total Score 2   If you checked off any problems, how difficult have these problems made it for you to do your work, take care of things at home, or get along with other people? Somewhat difficult       Review of Systems   Constitutional: Negative.    HENT: Negative.   "  Eyes: Negative.    Respiratory: Negative.    Cardiovascular: Negative.    Gastrointestinal: Negative.    Endocrine: Negative.    Genitourinary: Negative.    Musculoskeletal: Negative.    Skin: Negative.    Allergic/Immunologic: Negative.    Neurological: Negative.    Hematological: Negative.    Psychiatric/Behavioral: The patient is nervous/anxious.       Objective   Vital Signs:   /80   Pulse 84   Temp 97.4 °F (36.3 °C)   Ht 157.5 cm (62.01\")   Wt 80.6 kg (177 lb 9.6 oz)   SpO2 99%   BMI 32.48 kg/m²     Physical Exam  Constitutional:       General: She is not in acute distress.     Appearance: Normal appearance. She is obese. She is not ill-appearing, toxic-appearing or diaphoretic.   HENT:      Head: Normocephalic and atraumatic.   Eyes:      General: No scleral icterus.        Right eye: No discharge.         Left eye: No discharge.      Extraocular Movements: Extraocular movements intact.   Cardiovascular:      Rate and Rhythm: Normal rate and regular rhythm.      Heart sounds: Normal heart sounds.   Pulmonary:      Effort: No respiratory distress.   Neurological:      General: No focal deficit present.      Mental Status: She is alert and oriented to person, place, and time.      Motor: No weakness.      Gait: Gait normal.   Psychiatric:         Mood and Affect: Mood and affect normal.         Speech: Speech normal.         Behavior: Behavior normal.         Thought Content: Thought content normal.         Cognition and Memory: Cognition and memory normal.        Result Review :                 Assessment and Plan    Diagnoses and all orders for this visit:    1. Anxiety associated with depression (Primary)  -     busPIRone (BUSPAR) 5 MG tablet; Take 1 tablet by mouth 3 (Three) Times a Day.  Dispense: 270 tablet; Refill: 0      Patient presents today to discuss anxiety with depression.  I discussed different treatment options with the patient including switching buspirone to a daily SSRI, like " sertraline.  We also discussed potentially increasing her dose of buspirone as she feels as if her anxiety is well controlled on this medication.  Patient would like trying to increase her buspirone first prior to switching treatment regimens.  Patient's BuSpar was increased from 5 mg 2 times daily to 5 mg 3 times a day.  Patient will call us if she continues to have depressive thoughts in the morning.    Patient's blood pressure is elevated today.  On review of her chart, her blood pressures have been well controlled.  Patient was advised to check her blood pressure in the morning at the same time every day and record these readings.  She will call our office if she has blood pressures greater than 140/80 and will call office for sooner follow-up appointment if needed.      Follow Up   Return in about 3 months (around 5/28/2022) for Recheck, anxiety, depression.  We can also plan to discuss hyperlipidemia at this appointment as well.  Patient was given instructions and counseling regarding her condition or for health maintenance advice. Please see specific information pulled into the AVS if appropriate.     Electronically signed by MICHELLE Hidalgo, 02/28/22, 1:58 PM EST.

## 2022-05-31 ENCOUNTER — TELEPHONE (OUTPATIENT)
Dept: FAMILY MEDICINE CLINIC | Facility: CLINIC | Age: 72
End: 2022-05-31

## 2022-06-01 DIAGNOSIS — M79.671 FOOT PAIN, BILATERAL: Primary | ICD-10-CM

## 2022-06-01 DIAGNOSIS — M79.672 FOOT PAIN, BILATERAL: Primary | ICD-10-CM

## 2022-06-02 DIAGNOSIS — F41.8 ANXIETY ASSOCIATED WITH DEPRESSION: ICD-10-CM

## 2022-06-02 RX ORDER — BUSPIRONE HYDROCHLORIDE 5 MG/1
TABLET ORAL
Qty: 270 TABLET | Refills: 0 | Status: SHIPPED | OUTPATIENT
Start: 2022-06-02 | End: 2022-09-28

## 2022-06-02 NOTE — TELEPHONE ENCOUNTER
Rx Refill Note  Requested Prescriptions     Pending Prescriptions Disp Refills   • busPIRone (BUSPAR) 5 MG tablet [Pharmacy Med Name: busPIRone HCL 5 MG TABLET] 270 tablet 0     Sig: TAKE ONE TABLET BY MOUTH THREE TIMES A DAY      Last office visit with prescribing clinician: 2/28/2022      Next office visit with prescribing clinician: 7/8/2022            Anselmo Woodard MA  06/02/22, 10:59 EDT

## 2022-07-08 ENCOUNTER — OFFICE VISIT (OUTPATIENT)
Dept: FAMILY MEDICINE CLINIC | Facility: CLINIC | Age: 72
End: 2022-07-08

## 2022-07-08 VITALS
HEART RATE: 89 BPM | OXYGEN SATURATION: 98 % | SYSTOLIC BLOOD PRESSURE: 126 MMHG | TEMPERATURE: 96.9 F | DIASTOLIC BLOOD PRESSURE: 78 MMHG | WEIGHT: 173 LBS | HEIGHT: 62 IN | BODY MASS INDEX: 31.83 KG/M2

## 2022-07-08 DIAGNOSIS — E78.5 HYPERLIPIDEMIA, UNSPECIFIED HYPERLIPIDEMIA TYPE: ICD-10-CM

## 2022-07-08 DIAGNOSIS — F41.8 ANXIETY ASSOCIATED WITH DEPRESSION: Primary | ICD-10-CM

## 2022-07-08 PROCEDURE — 99214 OFFICE O/P EST MOD 30 MIN: CPT | Performed by: NURSE PRACTITIONER

## 2022-07-08 NOTE — PROGRESS NOTES
Chief Complaint  Anxiety (5m anxiety w depression fu. Improved with buspirone. Sometimes takes 2t or 3t) and Hyperlipidemia (Elevated lipids in November of 21. Nonfasting )    Masks/face shield/appropriate PPE were worn for the entirety of the visit by the patient, MA, and provider.     Subjective        Verito العراقي presents to [facility]  History of Present Illness  Verito العراقي is a 72 y.o. female who presents to follow-up on anxiety and hyperlipidemia.     Anxiety: PHQ-2 Total Score: 0 Patient states she feels fine. She takes buspirone 2-3 times a day as sometimes she forgets to take the third dose sometimes. She denies suicidal thoughts. She denies adverse GI symptoms, drowsiness, or heart palpitations.      Hyperipidemia.  Lipid panel performed in Novembr 2021 did reveal elevated total cholesterol and elevated LDL.  Due to increased ASCVD risk score, it was advised that patient start on a statin.  Patient was getting ready for an extended vacation and did not want to start any medicine at that time. She is walking everyday for exercise.     The 10-year ASCVD risk score (Coty JINA Jr., et al., 2013) is: 11.5%    Values used to calculate the score:      Age: 72 years      Sex: Female      Is Non- : No      Diabetic: No      Tobacco smoker: No      Systolic Blood Pressure: 126 mmHg      Is BP treated: No      HDL Cholesterol: 71 mg/dL      Total Cholesterol: 236 mg/dL    Review of Systems   Constitutional: Negative.    HENT: Negative.    Eyes: Negative.    Respiratory: Negative.    Cardiovascular: Negative.    Gastrointestinal: Negative.    Endocrine: Negative.    Genitourinary: Negative.    Musculoskeletal: Negative.    Skin: Negative.    Allergic/Immunologic: Negative.    Neurological: Negative.    Hematological: Negative.    Psychiatric/Behavioral: Negative.       ROS documented by MA. Reviewed by MICHELLE Hidalgo.     Objective   Vital Signs:  BP  "126/78   Pulse 89   Temp 96.9 °F (36.1 °C)   Ht 157.5 cm (62\")   Wt 78.5 kg (173 lb)   SpO2 98%   BMI 31.64 kg/m²   Estimated body mass index is 31.64 kg/m² as calculated from the following:    Height as of this encounter: 157.5 cm (62\").    Weight as of this encounter: 78.5 kg (173 lb).          Physical Exam  Vitals reviewed.   Constitutional:       General: She is not in acute distress.     Appearance: Normal appearance. She is not ill-appearing, toxic-appearing or diaphoretic.   HENT:      Head: Normocephalic and atraumatic.   Eyes:      General: No scleral icterus.        Right eye: No discharge.         Left eye: No discharge.      Extraocular Movements: Extraocular movements intact.   Cardiovascular:      Rate and Rhythm: Normal rate and regular rhythm.      Pulses: Normal pulses.      Heart sounds: Normal heart sounds. No murmur heard.  Pulmonary:      Effort: Pulmonary effort is normal. No respiratory distress.      Breath sounds: Normal breath sounds. No wheezing.   Neurological:      General: No focal deficit present.      Mental Status: She is alert and oriented to person, place, and time.      Motor: No weakness.      Gait: Gait normal.   Psychiatric:         Attention and Perception: Attention normal.         Mood and Affect: Mood normal.         Speech: Speech normal.         Behavior: Behavior normal.         Thought Content: Thought content normal. Thought content does not include suicidal ideation. Thought content does not include suicidal plan.        Result Review :    CMP    CMP 11/22/21   Glucose 99   BUN 13   Creatinine 0.77   eGFR Non  Am 78   eGFR African Am 90   Sodium 140   Potassium 4.4   Chloride 101   Calcium 9.6   Total Protein 6.8   Albumin 4.4   Globulin 2.4   Total Bilirubin 0.4   Alkaline Phosphatase 67   AST (SGOT) 18   ALT (SGPT) 18      Comments are available for some flowsheets but are not being displayed.           Lipid Panel    Lipid Panel 11/22/21   Total " Cholesterol 236 (A)   Triglycerides 77   HDL Cholesterol 71   VLDL Cholesterol 13   LDL Cholesterol  152 (A)   (A) Abnormal value                      Assessment and Plan   Diagnoses and all orders for this visit:    1. Anxiety associated with depression (Primary)  Assessment & Plan:  Patient's anxiety and depression well controlled with current dose of buspirone. Patient will continue current dose.      2. Hyperlipidemia, unspecified hyperlipidemia type  Assessment & Plan:  We discussed limited saturated fats and continuing at least 150 minutes of moderate intensity physical activity per week. We will recheck lipids prior to annual visit as patient is not fasting today. We could also consider calcium CT score.       Of note, patient's blood pressure is improved today compared to our last office visit.        Follow Up   Return in about 5 months (around 11/29/2022), or if symptoms worsen or fail to improve, for Medicare Wellness- fasting labs 1 month prior.  Patient was given instructions and counseling regarding her condition or for health maintenance advice. Please see specific information pulled into the AVS if appropriate.     Electronically signed by MICHELLE Hidalgo, 07/11/22, 6:48 AM EDT.

## 2022-07-10 NOTE — ASSESSMENT & PLAN NOTE
We discussed limited saturated fats and continuing at least 150 minutes of moderate intensity physical activity per week. We will recheck lipids prior to annual visit as patient is not fasting today. We could also consider calcium CT score.

## 2022-07-10 NOTE — ASSESSMENT & PLAN NOTE
Patient's anxiety and depression well controlled with current dose of buspirone. Patient will continue current dose.

## 2022-07-11 ENCOUNTER — TELEPHONE (OUTPATIENT)
Dept: FAMILY MEDICINE CLINIC | Facility: CLINIC | Age: 72
End: 2022-07-11

## 2022-07-11 DIAGNOSIS — E78.41 ELEVATED LIPOPROTEIN(A): ICD-10-CM

## 2022-07-11 DIAGNOSIS — Z82.49 FAMILY HISTORY OF HEART DISEASE: ICD-10-CM

## 2022-07-11 DIAGNOSIS — Z13.6 SCREENING FOR CARDIOVASCULAR CONDITION: Primary | ICD-10-CM

## 2022-07-11 NOTE — TELEPHONE ENCOUNTER
----- Message from MICHELLE Campo sent at 7/11/2022  6:49 AM EDT -----  Regarding: Calcium CT scan  I meant to mention this to patient at her appointment, but if she is interested, we can perform a routine CT scan screening, coronary calcium CT scan, to evaluate atherosclerosis or plaque in the arteries. This can help us further determine benefit of starting a cholesterol lowering medication. Please let me know if patient is interested in this screening.     Thank you,   Christa

## 2022-07-11 NOTE — TELEPHONE ENCOUNTER
Patient returned call to Maite. Tried to assist .  She only wants to speak with Maite.       Please advise.

## 2022-08-04 ENCOUNTER — APPOINTMENT (OUTPATIENT)
Dept: WOMENS IMAGING | Facility: HOSPITAL | Age: 72
End: 2022-08-04

## 2022-08-04 PROCEDURE — G0279 TOMOSYNTHESIS, MAMMO: HCPCS | Performed by: RADIOLOGY

## 2022-08-04 PROCEDURE — 77065 DX MAMMO INCL CAD UNI: CPT | Performed by: RADIOLOGY

## 2022-09-26 DIAGNOSIS — F41.8 ANXIETY ASSOCIATED WITH DEPRESSION: ICD-10-CM

## 2022-09-28 RX ORDER — BUSPIRONE HYDROCHLORIDE 5 MG/1
TABLET ORAL
Qty: 270 TABLET | Refills: 0 | Status: SHIPPED | OUTPATIENT
Start: 2022-09-28 | End: 2022-11-18

## 2022-11-04 DIAGNOSIS — E55.9 VITAMIN D DEFICIENCY, UNSPECIFIED: ICD-10-CM

## 2022-11-04 DIAGNOSIS — R53.83 OTHER FATIGUE: ICD-10-CM

## 2022-11-04 DIAGNOSIS — E78.41 ELEVATED LIPOPROTEIN(A): ICD-10-CM

## 2022-11-04 DIAGNOSIS — R73.01 IMPAIRED FASTING BLOOD SUGAR: ICD-10-CM

## 2022-11-04 DIAGNOSIS — E78.5 HYPERLIPIDEMIA, UNSPECIFIED HYPERLIPIDEMIA TYPE: Primary | ICD-10-CM

## 2022-11-07 VITALS
RESPIRATION RATE: 24 BRPM | WEIGHT: 170 LBS | HEIGHT: 63 IN | SYSTOLIC BLOOD PRESSURE: 113 MMHG | RESPIRATION RATE: 10 BRPM | SYSTOLIC BLOOD PRESSURE: 122 MMHG | HEART RATE: 76 BPM | DIASTOLIC BLOOD PRESSURE: 84 MMHG | SYSTOLIC BLOOD PRESSURE: 123 MMHG | DIASTOLIC BLOOD PRESSURE: 87 MMHG | DIASTOLIC BLOOD PRESSURE: 74 MMHG | RESPIRATION RATE: 25 BRPM | RESPIRATION RATE: 17 BRPM | DIASTOLIC BLOOD PRESSURE: 88 MMHG | HEART RATE: 92 BPM | SYSTOLIC BLOOD PRESSURE: 126 MMHG | DIASTOLIC BLOOD PRESSURE: 59 MMHG | DIASTOLIC BLOOD PRESSURE: 78 MMHG | HEART RATE: 75 BPM | OXYGEN SATURATION: 95 % | RESPIRATION RATE: 11 BRPM | OXYGEN SATURATION: 98 % | TEMPERATURE: 97.1 F | RESPIRATION RATE: 8 BRPM | DIASTOLIC BLOOD PRESSURE: 62 MMHG | SYSTOLIC BLOOD PRESSURE: 138 MMHG | DIASTOLIC BLOOD PRESSURE: 57 MMHG | TEMPERATURE: 97.5 F | HEART RATE: 78 BPM | OXYGEN SATURATION: 97 % | SYSTOLIC BLOOD PRESSURE: 139 MMHG | SYSTOLIC BLOOD PRESSURE: 131 MMHG | RESPIRATION RATE: 13 BRPM | HEART RATE: 70 BPM | OXYGEN SATURATION: 96 % | SYSTOLIC BLOOD PRESSURE: 116 MMHG | OXYGEN SATURATION: 99 % | HEART RATE: 81 BPM | HEART RATE: 82 BPM | HEART RATE: 79 BPM | SYSTOLIC BLOOD PRESSURE: 162 MMHG | RESPIRATION RATE: 16 BRPM

## 2022-11-07 PROBLEM — Z86.010 SURVEILLANCE DUE TO PRIOR COLONIC NEOPLASIA: Status: ACTIVE | Noted: 2022-11-08

## 2022-11-07 PROBLEM — Z86.010 PERSONAL HISTORY OF COLONIC POLYPS: Status: ACTIVE | Noted: 2022-11-08

## 2022-11-08 ENCOUNTER — OFFICE (AMBULATORY)
Dept: URBAN - METROPOLITAN AREA PATHOLOGY 4 | Facility: PATHOLOGY | Age: 72
End: 2022-11-08
Payer: MEDICARE

## 2022-11-08 ENCOUNTER — AMBULATORY SURGICAL CENTER (AMBULATORY)
Dept: URBAN - METROPOLITAN AREA SURGERY 17 | Facility: SURGERY | Age: 72
End: 2022-11-08
Payer: MEDICARE

## 2022-11-08 DIAGNOSIS — K63.5 POLYP OF COLON: ICD-10-CM

## 2022-11-08 DIAGNOSIS — Z86.010 PERSONAL HISTORY OF COLONIC POLYPS: ICD-10-CM

## 2022-11-08 DIAGNOSIS — K64.8 OTHER HEMORRHOIDS: ICD-10-CM

## 2022-11-08 DIAGNOSIS — K57.30 DIVERTICULOSIS OF LARGE INTESTINE WITHOUT PERFORATION OR ABS: ICD-10-CM

## 2022-11-08 LAB
GI HISTOLOGY: PDF REPORT: (no result)
Lab: (no result)

## 2022-11-08 PROCEDURE — 88305 TISSUE EXAM BY PATHOLOGIST: CPT | Performed by: INTERNAL MEDICINE

## 2022-11-08 PROCEDURE — 45385 COLONOSCOPY W/LESION REMOVAL: CPT | Mod: PT | Performed by: INTERNAL MEDICINE

## 2022-11-08 NOTE — SERVICEHPINOTES
72-year-old female without GI complaints.  Family history is negative for polyps or colon cancer.  She has a personal history of adenomatous polyps and presents for a follow-up colonoscopy.

## 2022-11-18 DIAGNOSIS — F41.8 ANXIETY ASSOCIATED WITH DEPRESSION: ICD-10-CM

## 2022-11-18 RX ORDER — BUSPIRONE HYDROCHLORIDE 5 MG/1
TABLET ORAL
Qty: 270 TABLET | Refills: 0 | Status: SHIPPED | OUTPATIENT
Start: 2022-11-18 | End: 2022-12-01 | Stop reason: SDUPTHER

## 2022-11-18 NOTE — TELEPHONE ENCOUNTER
Rx Refill Note  Requested Prescriptions     Pending Prescriptions Disp Refills   • busPIRone (BUSPAR) 5 MG tablet [Pharmacy Med Name: busPIRone HCL 5 MG TABLET] 270 tablet 0     Sig: TAKE ONE TABLET BY MOUTH THREE TIMES A DAY      Last office visit with prescribing clinician: 7/8/2022      Next office visit with prescribing clinician: 12/1/2022            Anselmo Woodard MA  11/18/22, 10:14 EST

## 2022-12-01 ENCOUNTER — OFFICE VISIT (OUTPATIENT)
Dept: FAMILY MEDICINE CLINIC | Facility: CLINIC | Age: 72
End: 2022-12-01

## 2022-12-01 VITALS
OXYGEN SATURATION: 98 % | WEIGHT: 166 LBS | TEMPERATURE: 97.7 F | DIASTOLIC BLOOD PRESSURE: 74 MMHG | SYSTOLIC BLOOD PRESSURE: 122 MMHG | BODY MASS INDEX: 30.55 KG/M2 | HEIGHT: 62 IN | HEART RATE: 78 BPM

## 2022-12-01 DIAGNOSIS — Z00.00 MEDICARE ANNUAL WELLNESS VISIT, SUBSEQUENT: Primary | ICD-10-CM

## 2022-12-01 DIAGNOSIS — F41.8 ANXIETY ASSOCIATED WITH DEPRESSION: ICD-10-CM

## 2022-12-01 PROBLEM — Z86.79 HISTORY OF MITRAL VALVE PROLAPSE: Status: ACTIVE | Noted: 2022-12-01

## 2022-12-01 PROCEDURE — 1170F FXNL STATUS ASSESSED: CPT | Performed by: NURSE PRACTITIONER

## 2022-12-01 PROCEDURE — G0439 PPPS, SUBSEQ VISIT: HCPCS | Performed by: NURSE PRACTITIONER

## 2022-12-01 PROCEDURE — 1159F MED LIST DOCD IN RCRD: CPT | Performed by: NURSE PRACTITIONER

## 2022-12-01 RX ORDER — BUSPIRONE HYDROCHLORIDE 5 MG/1
5 TABLET ORAL 3 TIMES DAILY
Qty: 270 TABLET | Refills: 1 | Status: SHIPPED | OUTPATIENT
Start: 2022-12-01

## 2022-12-02 DIAGNOSIS — E78.00 PURE HYPERCHOLESTEROLEMIA: ICD-10-CM

## 2022-12-02 RX ORDER — ATORVASTATIN CALCIUM 10 MG/1
10 TABLET, FILM COATED ORAL DAILY
Qty: 90 TABLET | Refills: 0 | Status: SHIPPED | OUTPATIENT
Start: 2022-12-02 | End: 2022-12-07 | Stop reason: SDUPTHER

## 2022-12-07 DIAGNOSIS — E78.00 PURE HYPERCHOLESTEROLEMIA: ICD-10-CM

## 2022-12-07 RX ORDER — ATORVASTATIN CALCIUM 10 MG/1
10 TABLET, FILM COATED ORAL DAILY
Qty: 90 TABLET | Refills: 0 | Status: SHIPPED | OUTPATIENT
Start: 2022-12-07 | End: 2023-02-22 | Stop reason: SDUPTHER

## 2023-01-11 DIAGNOSIS — R94.5 ABNORMAL LIVER FUNCTION: ICD-10-CM

## 2023-01-11 DIAGNOSIS — E78.00 PURE HYPERCHOLESTEROLEMIA: Primary | ICD-10-CM

## 2023-01-11 DIAGNOSIS — E78.00 PURE HYPERCHOLESTEROLEMIA: ICD-10-CM

## 2023-01-25 LAB
ALBUMIN SERPL-MCNC: 4.5 G/DL (ref 3.5–5.2)
ALBUMIN/GLOB SERPL: 1.9 G/DL
ALP SERPL-CCNC: 66 U/L (ref 39–117)
ALT SERPL-CCNC: 16 U/L (ref 1–33)
AST SERPL-CCNC: 19 U/L (ref 1–32)
BILIRUB SERPL-MCNC: 0.6 MG/DL (ref 0–1.2)
BUN SERPL-MCNC: 10 MG/DL (ref 8–23)
BUN/CREAT SERPL: 11.9 (ref 7–25)
CALCIUM SERPL-MCNC: 9.9 MG/DL (ref 8.6–10.5)
CHLORIDE SERPL-SCNC: 93 MMOL/L (ref 98–107)
CHOLEST SERPL-MCNC: 166 MG/DL (ref 0–200)
CO2 SERPL-SCNC: 27.4 MMOL/L (ref 22–29)
CREAT SERPL-MCNC: 0.84 MG/DL (ref 0.57–1)
EGFRCR SERPLBLD CKD-EPI 2021: 73.5 ML/MIN/1.73
GLOBULIN SER CALC-MCNC: 2.4 GM/DL
GLUCOSE SERPL-MCNC: 90 MG/DL (ref 65–99)
HDLC SERPL-MCNC: 74 MG/DL (ref 40–60)
LDLC SERPL CALC-MCNC: 74 MG/DL (ref 0–100)
POTASSIUM SERPL-SCNC: 4.6 MMOL/L (ref 3.5–5.2)
PROT SERPL-MCNC: 6.9 G/DL (ref 6–8.5)
SODIUM SERPL-SCNC: 132 MMOL/L (ref 136–145)
TRIGL SERPL-MCNC: 99 MG/DL (ref 0–150)
VLDLC SERPL CALC-MCNC: 18 MG/DL (ref 5–40)

## 2023-02-13 ENCOUNTER — TELEPHONE (OUTPATIENT)
Dept: FAMILY MEDICINE CLINIC | Facility: CLINIC | Age: 73
End: 2023-02-13

## 2023-02-13 NOTE — TELEPHONE ENCOUNTER
Caller: Verito الرعاقي    Relationship to patient: Self    Best call back number:     Patient is needing: PATIENT WOULD LIKE A CALL BACK FROM THE OFFICE TO SCHEDULE AN  APPOINTMENT FOR WHEN PIYUSH HARRINGTON WOULD LIKE TO SEE HER NEXT.    PATIENT STATES SHE DOES NOW KNOW WHEN PIYUSH WOULD LIKE FOR HER TO RETURN TO THE OFFICE WHICH IS WHY SHE WOULD LIKE THE OFFICE TO CONTACT HER.

## 2023-02-16 ENCOUNTER — APPOINTMENT (OUTPATIENT)
Dept: WOMENS IMAGING | Facility: HOSPITAL | Age: 73
End: 2023-02-16
Payer: MEDICARE

## 2023-02-16 PROCEDURE — 77066 DX MAMMO INCL CAD BI: CPT | Performed by: RADIOLOGY

## 2023-02-16 PROCEDURE — G0279 TOMOSYNTHESIS, MAMMO: HCPCS | Performed by: RADIOLOGY

## 2023-02-22 DIAGNOSIS — E78.00 PURE HYPERCHOLESTEROLEMIA: ICD-10-CM

## 2023-02-22 RX ORDER — ATORVASTATIN CALCIUM 10 MG/1
10 TABLET, FILM COATED ORAL DAILY
Qty: 90 TABLET | Refills: 0 | Status: SHIPPED | OUTPATIENT
Start: 2023-02-22

## 2023-02-22 NOTE — TELEPHONE ENCOUNTER
Caller: Verito العراقي    Relationship: Self    Best call back number: [phone]    Requested Prescriptions:   Requested Prescriptions     Pending Prescriptions Disp Refills   • atorvastatin (LIPITOR) 10 MG tablet 90 tablet 0     Sig: Take 1 tablet by mouth Daily.        Pharmacy where request should be sent: [facility] - [address] [phone] [facility] [phone] [facility]     Additional details provided by patient:     Does the patient have less than a 3 day supply:  [] Yes  [x] No    Would you like a call back once the refill request has been completed: [] Yes [x] No    If the office needs to give you a call back, can they leave a voicemail: [] Yes [x] No    Tom Cartagena Rep   02/22/23 12:20 EST

## 2023-03-01 NOTE — PROGRESS NOTES
"Chief Complaint  Anxiety (3M fu - PHQ 0 CARINA 1 )    Masks/face shield/appropriate PPE were worn for the entirety of the visit by the patient, MA, and provider.     Subjective        Verito العراقي presents to [facility]  History of Present Illness  Verito العراقي is a 73 y.o. female presents to the clinic today to follow-up on anxiety and depression.  Patient is currently taking buspirone 5 mg two times daily.  She denies adverse side effects of these medications.  She denies thoughts of suicide.  Anxiety is well controlled.  Patient had lab work performed in January and cholesterol levels improved with atorvastatin.  She did have mild hyponatremia.  Denies confusion or weakness.    Review of Systems   Constitutional: Negative for chills, fatigue and fever.   Neurological: Negative for dizziness and light-headedness.   Psychiatric/Behavioral: Negative for dysphoric mood and suicidal ideas. The patient is not nervous/anxious.       Objective   Vital Signs:  /66   Pulse 86   Temp 97.7 °F (36.5 °C)   Ht 157.5 cm (62\")   Wt 77.6 kg (171 lb)   SpO2 98%   BMI 31.28 kg/m²   Estimated body mass index is 31.28 kg/m² as calculated from the following:    Height as of this encounter: 157.5 cm (62\").    Weight as of this encounter: 77.6 kg (171 lb).             Physical Exam  Vitals reviewed.   Constitutional:       General: She is not in acute distress.     Appearance: Normal appearance. She is not ill-appearing, toxic-appearing or diaphoretic.   HENT:      Head: Normocephalic and atraumatic.   Pulmonary:      Effort: Pulmonary effort is normal. No respiratory distress.   Neurological:      General: No focal deficit present.      Mental Status: She is alert and oriented to person, place, and time.   Psychiatric:         Attention and Perception: Attention normal.         Mood and Affect: Mood normal.         Speech: Speech normal.         Behavior: Behavior normal. Behavior is " cooperative.         Thought Content: Thought content normal.        Result Review :                   Assessment and Plan   Diagnoses and all orders for this visit:    1. Anxiety associated with depression (Primary)  Assessment & Plan:  Patient's anxiety and depression is well controlled with current dose of buspirone.  Continue buspirone as prescribed.      Patient did have mild hyponatremia on recent lab work.  Patient is asymptomatic and vital signs are within normal limits.  Recommend we repeat this at her follow-up.  Continue other medications as prescribed.         Follow Up   Return in about 5 months (around 8/2/2023).  Patient was given instructions and counseling regarding her condition or for health maintenance advice. Please see specific information pulled into the AVS if appropriate.     Electronically signed by MICHELLE Hidalgo, 03/02/23, 11:17 AM EST.

## 2023-03-02 ENCOUNTER — OFFICE VISIT (OUTPATIENT)
Dept: FAMILY MEDICINE CLINIC | Facility: CLINIC | Age: 73
End: 2023-03-02
Payer: MEDICARE

## 2023-03-02 VITALS
BODY MASS INDEX: 31.47 KG/M2 | HEART RATE: 86 BPM | WEIGHT: 171 LBS | HEIGHT: 62 IN | SYSTOLIC BLOOD PRESSURE: 122 MMHG | DIASTOLIC BLOOD PRESSURE: 66 MMHG | TEMPERATURE: 97.7 F | OXYGEN SATURATION: 98 %

## 2023-03-02 DIAGNOSIS — F41.8 ANXIETY ASSOCIATED WITH DEPRESSION: Primary | ICD-10-CM

## 2023-03-02 PROCEDURE — 99213 OFFICE O/P EST LOW 20 MIN: CPT | Performed by: NURSE PRACTITIONER

## 2023-03-02 NOTE — ASSESSMENT & PLAN NOTE
Patient's anxiety and depression is well controlled with current dose of buspirone.  Continue buspirone as prescribed.

## 2023-05-24 DIAGNOSIS — E78.00 PURE HYPERCHOLESTEROLEMIA: ICD-10-CM

## 2023-05-24 RX ORDER — ATORVASTATIN CALCIUM 10 MG/1
10 TABLET, FILM COATED ORAL DAILY
Qty: 90 TABLET | Refills: 0 | Status: SHIPPED | OUTPATIENT
Start: 2023-05-24

## 2023-05-24 NOTE — TELEPHONE ENCOUNTER
Caller: Verito العراقي    Relationship: Self    Best call back number: [phone]    Requested Prescriptions:   Requested Prescriptions     Pending Prescriptions Disp Refills   • atorvastatin (LIPITOR) 10 MG tablet 90 tablet 0     Sig: Take 1 tablet by mouth Daily.        Pharmacy where request should be sent: [facility] - [address] [phone] [facility] [phone]      Last office visit with prescribing clinician: 3/2/2023   Last telemedicine visit with prescribing clinician: Visit date not found   Next office visit with prescribing clinician: 8/7/2023     Additional details provided by patient: 3 DAY SUPPLY    Does the patient have less than a 3 day supply:  [x] Yes  [] No    Would you like a call back once the refill request has been completed: [] Yes [x] No    If the office needs to give you a call back, can they leave a voicemail: [] Yes [x] No    Tom Rdz Rep   05/24/23 11:30 EDT

## 2023-08-03 DIAGNOSIS — E78.00 PURE HYPERCHOLESTEROLEMIA: ICD-10-CM

## 2023-08-03 RX ORDER — ATORVASTATIN CALCIUM 10 MG/1
TABLET, FILM COATED ORAL
Qty: 90 TABLET | Refills: 3 | Status: SHIPPED | OUTPATIENT
Start: 2023-08-03

## 2023-08-03 RX ORDER — ATORVASTATIN CALCIUM 10 MG/1
TABLET, FILM COATED ORAL
Qty: 90 TABLET | Refills: 3 | Status: SHIPPED | OUTPATIENT
Start: 2023-08-03 | End: 2023-08-03

## 2023-08-07 ENCOUNTER — OFFICE VISIT (OUTPATIENT)
Dept: FAMILY MEDICINE CLINIC | Facility: CLINIC | Age: 73
End: 2023-08-07
Payer: MEDICARE

## 2023-08-07 VITALS
OXYGEN SATURATION: 98 % | TEMPERATURE: 97.6 F | BODY MASS INDEX: 32.39 KG/M2 | WEIGHT: 176 LBS | HEIGHT: 62 IN | DIASTOLIC BLOOD PRESSURE: 82 MMHG | SYSTOLIC BLOOD PRESSURE: 138 MMHG | HEART RATE: 78 BPM

## 2023-08-07 DIAGNOSIS — F41.8 ANXIETY ASSOCIATED WITH DEPRESSION: Primary | ICD-10-CM

## 2023-08-07 DIAGNOSIS — E78.5 HYPERLIPIDEMIA, UNSPECIFIED HYPERLIPIDEMIA TYPE: ICD-10-CM

## 2023-08-07 DIAGNOSIS — E87.1 HYPONATREMIA: ICD-10-CM

## 2023-08-07 PROCEDURE — 99213 OFFICE O/P EST LOW 20 MIN: CPT | Performed by: NURSE PRACTITIONER

## 2023-08-07 RX ORDER — BUSPIRONE HYDROCHLORIDE 5 MG/1
5 TABLET ORAL 3 TIMES DAILY
Qty: 270 TABLET | Refills: 1 | Status: SHIPPED | OUTPATIENT
Start: 2023-08-07

## 2023-08-07 NOTE — PROGRESS NOTES
"Chief Complaint  Anxiety (Follow up on buspar no complains ) and Hyperlipidemia    Subjective        Verito العراقي presents to [facility]  History of Present Illness  Verito العراقي is a 73 y.o. female who presents to the clinic today to follow-up on anxiety and hyperlipidemia. She is doing well and is without complaints.  She is taking BuSpar 5 mg 2-3 times daily for anxiety and tolerating well.  She denies depressive symptoms.  She is taking atorvastatin 10 mg daily for hyperlipidemia and tolerating well without myalgias.    PHQ-2 Total Score: 0   Review of Systems   Constitutional:  Negative for chills, fatigue and fever.   Musculoskeletal:  Negative for myalgias.   Psychiatric/Behavioral:  Negative for dysphoric mood and suicidal ideas. The patient is not nervous/anxious.     Objective   Vital Signs:  /82   Pulse 78   Temp 97.6 øF (36.4 øC)   Ht 157.5 cm (62\")   Wt 79.8 kg (176 lb)   SpO2 98%   BMI 32.19 kg/mý   Estimated body mass index is 32.19 kg/mý as calculated from the following:    Height as of this encounter: 157.5 cm (62\").    Weight as of this encounter: 79.8 kg (176 lb).             Physical Exam  Vitals reviewed.   Constitutional:       General: She is not in acute distress.     Appearance: Normal appearance. She is not ill-appearing, toxic-appearing or diaphoretic.   HENT:      Head: Normocephalic and atraumatic.   Eyes:      General: No scleral icterus.        Right eye: No discharge.         Left eye: No discharge.   Pulmonary:      Effort: Pulmonary effort is normal. No respiratory distress.   Neurological:      General: No focal deficit present.      Mental Status: She is alert and oriented to person, place, and time.      Motor: No weakness.      Gait: Gait normal.   Psychiatric:         Attention and Perception: Attention normal.         Mood and Affect: Mood normal.         Speech: Speech normal.         Behavior: Behavior normal.         " Thought Content: Thought content normal.      Result Review :                   Assessment and Plan   Diagnoses and all orders for this visit:    1. Anxiety associated with depression (Primary)  -     busPIRone (BUSPAR) 5 MG tablet; Take 1 tablet by mouth 3 (Three) Times a Day.  Dispense: 270 tablet; Refill: 1    2. Hyperlipidemia, unspecified hyperlipidemia type  -     Lipid Panel    3. Hyponatremia  -     Comprehensive metabolic panel      Anxiety/depression: Continue BuSpar as prescribed.  Refill today.  Hyperlipidemia: Continue atorvastatin as prescribed.  Recently refilled.  Reevaluate CMP and lipid panel.       Follow Up   Return in about 6 months (around 2/7/2024) for Recheck, Medicare Wellness- labs 1 week prior.  Patient was given instructions and counseling regarding her condition or for health maintenance advice. Please see specific information pulled into the AVS if appropriate.     Electronically signed by MICHELLE Hidalgo, 08/07/23, 11:43 AM EDT.

## 2023-08-08 LAB
ALBUMIN SERPL-MCNC: 4.5 G/DL (ref 3.5–5.2)
ALBUMIN/GLOB SERPL: 2 G/DL
ALP SERPL-CCNC: 72 U/L (ref 39–117)
ALT SERPL-CCNC: 22 U/L (ref 1–33)
AST SERPL-CCNC: 19 U/L (ref 1–32)
BILIRUB SERPL-MCNC: 0.6 MG/DL (ref 0–1.2)
BUN SERPL-MCNC: 11 MG/DL (ref 8–23)
BUN/CREAT SERPL: 13.4 (ref 7–25)
CALCIUM SERPL-MCNC: 9.9 MG/DL (ref 8.6–10.5)
CHLORIDE SERPL-SCNC: 99 MMOL/L (ref 98–107)
CHOLEST SERPL-MCNC: 180 MG/DL (ref 0–200)
CO2 SERPL-SCNC: 24.5 MMOL/L (ref 22–29)
CREAT SERPL-MCNC: 0.82 MG/DL (ref 0.57–1)
EGFRCR SERPLBLD CKD-EPI 2021: 75.6 ML/MIN/1.73
GLOBULIN SER CALC-MCNC: 2.2 GM/DL
GLUCOSE SERPL-MCNC: 92 MG/DL (ref 65–99)
HDLC SERPL-MCNC: 72 MG/DL (ref 40–60)
LDLC SERPL CALC-MCNC: 94 MG/DL (ref 0–100)
POTASSIUM SERPL-SCNC: 4.4 MMOL/L (ref 3.5–5.2)
PROT SERPL-MCNC: 6.7 G/DL (ref 6–8.5)
SODIUM SERPL-SCNC: 137 MMOL/L (ref 136–145)
TRIGL SERPL-MCNC: 77 MG/DL (ref 0–150)
VLDLC SERPL CALC-MCNC: 14 MG/DL (ref 5–40)

## 2023-08-09 RX ORDER — ATORVASTATIN CALCIUM 20 MG/1
20 TABLET, FILM COATED ORAL DAILY
Qty: 90 TABLET | Refills: 1 | Status: SHIPPED | OUTPATIENT
Start: 2023-08-09

## 2023-11-03 DIAGNOSIS — E78.00 PURE HYPERCHOLESTEROLEMIA: ICD-10-CM

## 2023-11-03 RX ORDER — ATORVASTATIN CALCIUM 20 MG/1
20 TABLET, FILM COATED ORAL DAILY
Qty: 90 TABLET | Refills: 1 | Status: SHIPPED | OUTPATIENT
Start: 2023-11-03

## 2023-11-03 NOTE — TELEPHONE ENCOUNTER
PLEASE READ ADDITIONAL DETAILS    Caller: Verito العراقي    Relationship: Self    Best call back number: [phone]     Requested Prescriptions:   Requested Prescriptions     Pending Prescriptions Disp Refills    atorvastatin (LIPITOR) 20 MG tablet 90 tablet 1     Sig: Take 1 tablet by mouth Daily.        Pharmacy where request should be sent: [facility] [address] [phone] [facility] [phone]      Last office visit with prescribing clinician: 8/7/2023   Last telemedicine visit with prescribing clinician: Visit date not found   Next office visit with prescribing clinician: Visit date not found     Additional details provided by patient: PATIENT STATED THAT SHE NEEDS A NEW PRESCRIPTION REFLECTING THE DOSAGE CHANGE TO 2 TABLETS BY MOUTH DAILY.    Does the patient have less than a 3 day supply:  [] Yes  [x] No    Would you like a call back once the refill request has been completed: [x] Yes [] No    If the office needs to give you a call back, can they leave a voicemail: [x] Yes [] No    Tom Monae Rep   11/03/23 10:07 EDT

## 2023-12-04 DIAGNOSIS — E78.00 PURE HYPERCHOLESTEROLEMIA: ICD-10-CM

## 2023-12-04 RX ORDER — ATORVASTATIN CALCIUM 20 MG/1
20 TABLET, FILM COATED ORAL DAILY
Qty: 90 TABLET | Refills: 1 | Status: SHIPPED | OUTPATIENT
Start: 2023-12-04

## 2023-12-04 NOTE — TELEPHONE ENCOUNTER
Caller: Verito العراقي    Relationship: Self    Best call back number: [phone]    Requested Prescriptions:   Requested Prescriptions     Pending Prescriptions Disp Refills    atorvastatin (LIPITOR) 20 MG tablet 90 tablet 1     Sig: Take 1 tablet by mouth Daily.      Pharmacy where request should be sent: [facility] [address] [phone] [facility] [phone]      Last office visit with prescribing clinician: 8/7/2023   Last telemedicine visit with prescribing clinician: Visit date not found   Next office visit with prescribing clinician: Visit date not found     Additional details provided by patient: PT HAS 10 DAYS LEFT OF MEDICATION.     Does the patient have less than a 3 day supply:  [] Yes  [x] No    Would you like a call back once the refill request has been completed: [] Yes [x] No    If the office needs to give you a call back, can they leave a voicemail: [] Yes [] No    Tom Blanco Rep   12/04/23 10:51 EST

## 2024-02-28 ENCOUNTER — APPOINTMENT (OUTPATIENT)
Dept: WOMENS IMAGING | Facility: HOSPITAL | Age: 74
End: 2024-02-28
Payer: MEDICARE

## 2024-02-28 PROCEDURE — 77063 BREAST TOMOSYNTHESIS BI: CPT | Performed by: RADIOLOGY

## 2024-02-28 PROCEDURE — 77067 SCR MAMMO BI INCL CAD: CPT | Performed by: RADIOLOGY

## 2024-03-07 ENCOUNTER — TELEPHONE (OUTPATIENT)
Dept: FAMILY MEDICINE CLINIC | Facility: CLINIC | Age: 74
End: 2024-03-07
Payer: MEDICARE

## 2024-03-07 DIAGNOSIS — Z01.419 ROUTINE GYNECOLOGICAL EXAMINATION: Primary | ICD-10-CM

## 2024-03-07 NOTE — TELEPHONE ENCOUNTER
Patient can no longer see her gynecologist, Dr. Carli Rivera due to not accepting medicare. Patient is curious if Christa has an OB/Gyn she could recommend and/or send a referral to [facility] gynecology and scheduling can assist patient?

## 2024-03-10 DIAGNOSIS — F41.8 ANXIETY ASSOCIATED WITH DEPRESSION: ICD-10-CM

## 2024-03-11 RX ORDER — BUSPIRONE HYDROCHLORIDE 5 MG/1
5 TABLET ORAL 3 TIMES DAILY
Qty: 270 TABLET | Refills: 3 | Status: SHIPPED | OUTPATIENT
Start: 2024-03-11

## 2024-03-14 ENCOUNTER — TELEPHONE (OUTPATIENT)
Dept: FAMILY MEDICINE CLINIC | Facility: CLINIC | Age: 74
End: 2024-03-14
Payer: MEDICARE

## 2024-03-14 NOTE — TELEPHONE ENCOUNTER
Caller: Verito العراقي    Relationship: Self    Best call back number: [phone]     What was the call regarding: PATIENT WOULD LIKE TO SPEAK TO IAM REGARDING HER OBGYN REFERRAL    PLEASE CALL

## 2024-03-15 NOTE — TELEPHONE ENCOUNTER
Patient is aware OB/GYN referral was placed. Patient would like MICHELLE Hidalgo to recommend an OB/Gyn physician to her, if possible.

## 2024-03-15 NOTE — TELEPHONE ENCOUNTER
Pt informed v/u     [facility]  [address], [address]  [place] [address]  P: [phone]

## 2024-03-20 ENCOUNTER — APPOINTMENT (OUTPATIENT)
Dept: WOMENS IMAGING | Facility: HOSPITAL | Age: 74
End: 2024-03-20
Payer: MEDICARE

## 2024-03-20 PROCEDURE — 77065 DX MAMMO INCL CAD UNI: CPT | Performed by: RADIOLOGY

## 2024-03-20 PROCEDURE — G0279 TOMOSYNTHESIS, MAMMO: HCPCS | Performed by: RADIOLOGY

## 2024-03-21 ENCOUNTER — TELEPHONE (OUTPATIENT)
Dept: FAMILY MEDICINE CLINIC | Facility: CLINIC | Age: 74
End: 2024-03-21
Payer: MEDICARE

## 2024-03-21 DIAGNOSIS — R92.8 ABNORMAL MAMMOGRAM: Primary | ICD-10-CM

## 2024-03-21 NOTE — TELEPHONE ENCOUNTER
Pt had a mammogram and they called to get an order for a Left b stereotatic biopsy     Fax: [phone]

## 2024-03-26 ENCOUNTER — TELEPHONE (OUTPATIENT)
Dept: FAMILY MEDICINE CLINIC | Facility: CLINIC | Age: 74
End: 2024-03-26
Payer: MEDICARE

## 2024-03-26 NOTE — TELEPHONE ENCOUNTER
Caller: Verito العراقي    Relationship: Self    Best call back number:[phone] (Home)     What is the best time to reach you: ANYTIME    Who are you requesting to speak with (clinical staff, provider,  specific staff member): PIYUSH HARRINGTON    Do you know the name of the person who called:      What was the call regarding: PATIENT CALLED TO ASK FOR A CALLBACK TO DISCUSS HER RECENT MAMMOGRAMS.     Is it okay if the provider responds through MyChart:           THANKS

## 2024-03-26 NOTE — TELEPHONE ENCOUNTER
Patient notified Christa is not in on Tuesday patient request I leave message for her and have her call patient tomorrow

## 2024-03-27 NOTE — TELEPHONE ENCOUNTER
Scanned into media 3/20/24. Order already placed for biopsy. Please result mammo and I will call pt. Thanks!

## 2024-03-27 NOTE — TELEPHONE ENCOUNTER
It does appear there were calcifications of the left breast and further evaluation with a biopsy was recommended.

## 2024-04-15 ENCOUNTER — APPOINTMENT (OUTPATIENT)
Dept: WOMENS IMAGING | Facility: HOSPITAL | Age: 74
End: 2024-04-15
Payer: MEDICARE

## 2024-04-15 ENCOUNTER — LAB REQUISITION (OUTPATIENT)
Dept: LAB | Facility: HOSPITAL | Age: 74
End: 2024-04-15
Payer: MEDICARE

## 2024-04-15 DIAGNOSIS — N63.0 UNSPECIFIED LUMP IN UNSPECIFIED BREAST: ICD-10-CM

## 2024-04-15 PROCEDURE — 88305 TISSUE EXAM BY PATHOLOGIST: CPT | Performed by: NURSE PRACTITIONER

## 2024-04-15 PROCEDURE — C1819 TISSUE LOCALIZATION-EXCISION: HCPCS | Performed by: RADIOLOGY

## 2024-04-15 PROCEDURE — 19081 BX BREAST 1ST LESION STRTCTC: CPT | Performed by: RADIOLOGY

## 2024-04-15 PROCEDURE — A4648 IMPLANTABLE TISSUE MARKER: HCPCS | Performed by: RADIOLOGY

## 2024-04-15 PROCEDURE — 88342 IMHCHEM/IMCYTCHM 1ST ANTB: CPT | Performed by: NURSE PRACTITIONER

## 2024-04-15 PROCEDURE — 88341 IMHCHEM/IMCYTCHM EA ADD ANTB: CPT | Performed by: NURSE PRACTITIONER

## 2024-04-18 ENCOUNTER — TELEPHONE (OUTPATIENT)
Dept: SURGERY | Facility: CLINIC | Age: 74
End: 2024-04-18
Payer: MEDICARE

## 2024-04-18 NOTE — TELEPHONE ENCOUNTER
Called patient to review new patient appointment made by [facility] for a consult - appt 5/15/at 1:00 pm    Voicemail message was left requesting patient call and confirm    New patient packet has been mailed  SD

## 2024-04-19 ENCOUNTER — TELEPHONE (OUTPATIENT)
Dept: SURGERY | Facility: CLINIC | Age: 74
End: 2024-04-19
Payer: MEDICARE

## 2024-04-19 LAB
DX PRELIMINARY: NORMAL
LAB AP CASE REPORT: NORMAL
LAB AP CLINICAL INFORMATION: NORMAL
LAB AP SPECIAL STAINS: NORMAL
PATH REPORT.FINAL DX SPEC: NORMAL
PATH REPORT.GROSS SPEC: NORMAL

## 2024-04-19 NOTE — TELEPHONE ENCOUNTER
I called patient to review the appointment and MRI that Dr. Garrett ordered and she was confused on why she was referred to us and wanted to speak to the referring office first.     I did call and speak with Alexus at [facility] to review the patient request and ask that someone call her to discuss     I am holding 5/15 for her consult and 5/10 for her breast MRI that Dr. Garrett ordered.    SD

## 2024-04-22 ENCOUNTER — TELEPHONE (OUTPATIENT)
Dept: SURGERY | Facility: CLINIC | Age: 74
End: 2024-04-22
Payer: MEDICARE

## 2024-04-22 NOTE — TELEPHONE ENCOUNTER
Patient returned call and stated she is not doing the breast MRI at this time however she will keep her consult with Dr. Garrett to discuss options.    SD

## 2024-04-25 ENCOUNTER — TELEPHONE (OUTPATIENT)
Dept: FAMILY MEDICINE CLINIC | Facility: CLINIC | Age: 74
End: 2024-04-25
Payer: MEDICARE

## 2024-04-25 NOTE — TELEPHONE ENCOUNTER
I called patient to discuss breast biopsy results.  She reports she did talk with Dr. Garrett and plans to have a follow-up appoint with Dr. Garrett to discuss further plans and interventions moving forward.

## 2024-05-15 ENCOUNTER — OFFICE VISIT (OUTPATIENT)
Dept: SURGERY | Facility: CLINIC | Age: 74
End: 2024-05-15
Payer: MEDICARE

## 2024-05-15 ENCOUNTER — HOSPITAL ENCOUNTER (OUTPATIENT)
Dept: SURGERY | Facility: HOSPITAL | Age: 74
Discharge: HOME | End: 2024-05-15
Payer: MEDICARE

## 2024-05-15 ENCOUNTER — TELEPHONE (OUTPATIENT)
Dept: SURGERY | Facility: CLINIC | Age: 74
End: 2024-05-15

## 2024-05-15 ENCOUNTER — TELEPHONE (OUTPATIENT)
Dept: SURGERY | Facility: CLINIC | Age: 74
End: 2024-05-15
Payer: MEDICARE

## 2024-05-15 VITALS
SYSTOLIC BLOOD PRESSURE: 156 MMHG | BODY MASS INDEX: 32.57 KG/M2 | HEART RATE: 97 BPM | DIASTOLIC BLOOD PRESSURE: 80 MMHG | HEIGHT: 62 IN | RESPIRATION RATE: 17 BRPM | WEIGHT: 177 LBS | OXYGEN SATURATION: 97 %

## 2024-05-15 DIAGNOSIS — N64.59 OTHER SIGNS AND SYMPTOMS IN BREAST: ICD-10-CM

## 2024-05-15 DIAGNOSIS — Z91.89 INCREASED RISK OF BREAST CANCER: ICD-10-CM

## 2024-05-15 DIAGNOSIS — N60.92 ATYPICAL LOBULAR HYPERPLASIA (ALH) OF LEFT BREAST: Primary | ICD-10-CM

## 2024-05-15 PROCEDURE — 1159F MED LIST DOCD IN RCRD: CPT | Performed by: SURGERY

## 2024-05-15 PROCEDURE — 1160F RVW MEDS BY RX/DR IN RCRD: CPT | Performed by: SURGERY

## 2024-05-15 PROCEDURE — 99204 OFFICE O/P NEW MOD 45 MIN: CPT | Performed by: SURGERY

## 2024-05-15 NOTE — PROGRESS NOTES
[facility]     Referring Provider: Lisa Johnson MD     Chief complaint:  Left breast atypical lobular hyperplasia (ALH)     Subjective   HPI: Ms. Verito العراقي is a 75 yo woman, seen at the request of Dr. Lisa Johnson, for a new diagnosis of left breast atypical lobular hyperplasia (ALH). This was initially detected as an imaging abnormality on routine screening. Her work-up is detailed in the breast history section below. Prior to the biopsy, she denies any breast lumps, pain, skin changes, or nipple discharge.  She has right breast calcifications that were in imaging surveillance from 9934-5044.  She has prepectoral saline implants that were placed in her 40s.  She denies any prior breast biopsies. She denies any family history of breast or ovarian cancer.       I personally reviewed her records and summarized her relevant breast history/imagin2021, Screening MMG with Froilan ([facility]):  There are scattered areas of fibroglandular density.  There are bilateral pre-pectoral saline implants.  There are calcifications with grouped distribution seen in the CC view only seen in the sub areolar region of the right breast. These are seen best on the implant displaced CC view.  In the left breast, no suspicious masses, significant calcifications, or other abnormalities are seen.  BI-RADS 0: Incomplete    2022, Right Diagnostic MMG with Froilan ([facility]):  There are scattered areas of fibroglandular density.  There are bilateral pre-pectoral saline implants.  On the present examination, there are round calcifications with grouped distribution in the sub areolar region of the right breast on the CC magnification view. These are favored to correlate with layering milk of calcium calcifications seen in the ML projection, localizing the group to 9:00. Vascular and implant capsule calcifications are also noted. No additional calcifications are seen. There are no suspicious masses noted.  BI-RADS 3: Probably  Benign    08/04/2022, Right Diagnostic MMG with [facility]ilan ([facility]):  On the present examination, there are stable round, milk of calcium and very faint calcifications with grouped distribution in the anterior one-third sub-areolar region of the right breast. No suspicious forms are seen at this time. There has been no significant change from the prior exam(s).  BI-RADS 3: Probably Benign    02/16/2023, Bilateral Diagnostic MMG with [facility]ilan ([facility]):  There are scattered areas of fibroglandular density.  There are bilateral pre-pectoral saline implants.  1. On the present examination, there are stable milk of calcium calcifications with grouped distribution in the sub areolar region of the right breast.  2. There are round calcifications with diffuse distribution seen in both breasts. The parenchyma includes stable nodular asymmetries. There are no new suspicious masses, calcifications, or areas of architectural distortion.  3. There are areas of calcifications noted along both implant capsules.  BI-RADS 2: Benign Findings    02/28/2024, Screening MMG with [facility]ilan ([facility]):  There are scattered areas of fibroglandular density.  There are bilateral pre-pectoral saline implants.  1. There are areas of calcification noted in both implants. There has been no significant change from the prior exam(s).  2. There are a few punctate calcifications with grouped distribution seen in the anterior one third region of the left breast at 3 o'clock.  BI-RADS 0: Incomplete    03/20/2024, Left breast Digital Diagnostic MMG with [facility]il[facility] ([facility]):  Additional evaluation was performed for the calcification in the left breast, 3 o'clock seen on 02/28/2024. On the present examination, there are amorphous calcifications with grouped distribution in the anterior one third region of the left breast at 3 o'clock.  BI-RADS 4B: Suspicious    04/15/2024, Left Breast, Stereotactic Biopsy ([facility]):  1.  Breast, left, 3:00, for calcifications: Breast tissue with                -A.  Incidental atypical lobular hyperplasia               -B.  Clustered microcysts with calcifications               -C.  Columnar cell change               -D.  Cholesterol clefting, hemosiderin and fat necrosis               -E.  Clustered apocrine cysts               -F.   Florid ductal hyperplasia of the usual type               -G.  Fibroadenomatoid hyperplasia  -Tophat clip. Concordant.       Review of Systems   Constitutional:  Negative for appetite change, chills, diaphoresis, fatigue, fever and unexpected weight change.   HENT:   Negative for hearing loss, lump/mass, mouth sores, nosebleeds, sore throat, tinnitus, trouble swallowing and voice change.    Eyes:  Negative for eye problems and icterus.   Respiratory:  Negative for chest tightness, cough, hemoptysis, shortness of breath and wheezing.    Cardiovascular:  Negative for chest pain, leg swelling and palpitations.   Gastrointestinal:  Negative for abdominal distention, abdominal pain, blood in stool, constipation, diarrhea, nausea, rectal pain and vomiting.   Endocrine: Negative for hot flashes.   Genitourinary:  Negative for bladder incontinence, difficulty urinating, dyspareunia, dysuria, frequency, hematuria, menstrual problem, nocturia, pelvic pain, vaginal bleeding and vaginal discharge.    Musculoskeletal:  Negative for arthralgias, back pain, flank pain, gait problem, myalgias, neck pain and neck stiffness.   Skin:  Negative for itching, rash and wound.   Neurological:  Negative for dizziness, extremity weakness, gait problem, headaches, light-headedness, numbness, seizures and speech difficulty.   Hematological:  Negative for adenopathy. Does not bruise/bleed easily.   Psychiatric/Behavioral:  Negative for confusion, decreased concentration, depression, sleep disturbance and suicidal ideas. The patient is not nervous/anxious.        Medications:    Current Outpatient Medications:     atorvastatin (LIPITOR) 20 MG tablet, Take 1 tablet by  mouth Daily., Disp: 90 tablet, Rfl: 1    busPIRone (BUSPAR) 5 MG tablet, TAKE 1 TABLET THREE TIMES A DAY, Disp: 270 tablet, Rfl: 3    calcium carbonate-vitamin d 600-400 MG-UNIT per tablet, Take 1 tablet by mouth Daily As Needed., Disp: , Rfl:     Cetirizine HCl (ZYRTEC PO), Take 1 tablet by mouth daily., Disp: , Rfl:     fluticasone (FLONASE) 50 MCG/ACT nasal spray, 2 sprays by Each Nare route Daily., Disp: 16 mL, Rfl: 11    glucosamine-chondroitin 500-400 MG capsule capsule, Take 2 capsules by mouth Daily., Disp: , Rfl:     Multiple Vitamins tablet, Take by mouth., Disp: , Rfl:     vitamin C (ASCORBIC ACID) 500 MG tablet, Take 1 tablet by mouth Daily., Disp: , Rfl:     vitamin E 400 UNIT capsule, Take 1 capsule by mouth Daily., Disp: , Rfl:       Allergies   Allergen Reactions    Ciprofloxacin        Past Medical History:   Diagnosis Date    Acute conjunctivitis     Allergic rhinitis     Colon polyp 10/31/2017    , ascending colon tubular adenoma    Hemorrhoids     History of bone density study 2011    nl    History of bone density study     nl 2017    History of mitral valve prolapse     she was told this in the psat, but she had a normal echo in 2016    Hyperplastic colon polyp      hyperplastic rectal and colon polyps,     Pregnancy            Past Surgical History:   Procedure Laterality Date    COLONOSCOPY W/ POLYPECTOMY  10/31/2017    , ascending colon 3 mm 10/31/2017, sigmoid colon (3mm) - hyperplastic polyp  2012       Family History   Problem Relation Age of Onset    Macular degeneration Mother     Hypertension Mother     Dementia Mother     Diabetes Father     Heart disease Father     Prostate cancer Father     Heart disease Maternal Grandmother     Heart disease Paternal Grandfather        Social History     Socioeconomic History    Marital status:     Number of children: 3   Tobacco Use    Smoking status: Never    Smokeless tobacco: Never   Vaping Use     Vaping status: Never Used   Substance and Sexual Activity    Alcohol use: Not Currently     Alcohol/week: 7.0 standard drinks of alcohol     Types: 7 Glasses of wine per week     Comment: social    Drug use: No    Sexual activity: Defer     Patient drinks 2 servings of caffeine per day.       GYNECOLOGIC HISTORY:   G: 3. P: 3. AB: 0.  Last menstrual period: Postmenopausal  Age at menarche: 12-13  Age at first childbirth: 20  Lactation/How lon mo  Age at menopause: late 40s  Total years of oral contraceptive use: couple years  Total years of hormone replacement therapy: 0      Objective   PHYSICAL EXAMINATION  Vitals:    05/15/24 1235   BP: 156/80   Pulse: 97   Resp: 17   SpO2: 97%     ECOG 0 - Asymptomatic  General: NAD, well appearing  Psych: a&o x 3, normal mood and affect  Eyes: EOMI, no scleral icterus  ENMT: neck supple without masses or thyromegaly, mucus membranes moist  Resp: normal effort, CTAB  CV: RRR, no murmurs, no edema  GI: soft, NT, ND  MSK: normal gait, normal ROM in bilateral shoulders  Lymph nodes: no cervical, supraclavicular or axillary lymphadenopathy  Breast: symmetric, moderate size, grade 3 ptosis, saline implants  Right: IMF scar, otherwise no visible abnormalities on inspection while seated, with arms raised or hands on hips. No masses or nipple abnormalities.  Left: IMF scar, otherwise no visible abnormalities on inspection while seated, with arms raised or hands on hips. No masses or nipple abnormalities.      I have independently reviewed her imaging and here are my findings:   In the left breast at 3:00, there initially was a 3 mm cluster of calcifications.      Assessment & Plan   Assessment:   1. 74 y.o. F with a new diagnosis of left breast incidental atypical lobular hyperplasia, for which nonoperative management is recommended if MRI does not show any suspicious findings at this location.  2.  She has an increased lifetime risk of breast cancer (19%, TCv8, calculated 5/15/24)  versus the average 73yo at 4%.    Discussion:  We reviewed her pathology and imaging reports. I explained that traditionally in the past, if ALH was found on core biopsy, a larger surgical excision of the area was recommended due to the low risk of upgrade (risk of identifying DCIS or invasive carcinoma within the vicinity of the lesion). However, more recent studies have shown that with an adequate biopsy sample (large core needle), clear radiologic-pathologic concordance, and no unassessed suspicious lesions on imaging (masses), the risk of upgrade is exceedingly low (0-9%). In these cases, routine excision is not necessary and surveillance is a reasonable option. If her MRI does not show any suspicious findings in this location, then we will proceed with observation.    I explained that the diagnosis of atypical hyperplasia increases her lifetime risk of breast cancer bilaterally. I explained that breast cancer risk is assessed by considering multiple factors, including her family history and personal history, which now includes the diagnosis of ALH. We discussed the various models for calculating breast cancer risk, that none of them are perfect and that some overestimate or underestimate. I calculated her lifetime risk of breast cancer, which is 19.3% (TCv8). We discussed that breast cancer risk needs to be continually reassessed throughout her lifetime.    Plan:  -Breast MRI.  Follow-up after to review results.    Darlene Garrett MD    I spent 45 minutes caring for Verito on this date of service. This time includes time spent by me in the following activities: preparing for the visit, performing a medically appropriate examination and/or evaluation , counseling and educating the patient/family/caregiver, ordering medications, tests, or procedures, documenting information in the medical record, and independently interpreting results and communicating that information with the  patient/family/caregiver.      CC:  MD Christa Dowling APRN

## 2024-05-15 NOTE — TELEPHONE ENCOUNTER
Patient will have her Breast MRI on [place] 10 at 9 am at [facility] arrival time 8:30 am    Office follow up on 6/13 at 12 pm    Patient gave verbal understanding

## 2024-05-15 NOTE — LETTER
May 15, 2024       No Recipients    Patient: Verito العراقي   YOB: 1950   Date of Visit: 5/15/2024     Dear Lisa Johnson MD:       Thank you for referring Verito العراقي to me for evaluation. Below are the relevant portions of my assessment and plan of care.    If you have questions, please do not hesitate to call me. I look forward to following Verito along with you.         Sincerely,        Darlene Garrett MD        CC:   No Recipients    Darlene Garrett MD  05/15/24 1303  Sign when Signing Visit  [facility]     Referring Provider: Lisa Johnson MD     Chief complaint:  Left breast atypical lobular hyperplasia (ALH)     Subjective  HPI: Ms. Verito العراقي is a 73 yo woman, seen at the request of Dr. Lisa Johnson, for a new diagnosis of left breast atypical lobular hyperplasia (ALH). This was initially detected as an imaging abnormality on routine screening. Her work-up is detailed in the breast history section below. Prior to the biopsy, she denies any breast lumps, pain, skin changes, or nipple discharge.  She has right breast calcifications that were in imaging surveillance from 0560-5730.  She has prepectoral saline implants that were placed in her 40s.  She denies any prior breast biopsies. She denies any family history of breast or ovarian cancer.       I personally reviewed her records and summarized her relevant breast history/imagin2021, Screening MMG with Froilan ([facility]):  There are scattered areas of fibroglandular density.  There are bilateral pre-pectoral saline implants.  There are calcifications with grouped distribution seen in the CC view only seen in the sub areolar region of the right breast. These are seen best on the implant displaced CC view.  In the left breast, no suspicious masses, significant calcifications, or other abnormalities are seen.  BI-RADS 0: Incomplete    2022, Right Diagnostic MMG with Froilan ([facility]):  There are scattered areas of  fibroglandular density.  There are bilateral pre-pectoral saline implants.  On the present examination, there are round calcifications with grouped distribution in the sub areolar region of the right breast on the CC magnification view. These are favored to correlate with layering milk of calcium calcifications seen in the ML projection, localizing the group to 9:00. Vascular and implant capsule calcifications are also noted. No additional calcifications are seen. There are no suspicious masses noted.  BI-RADS 3: Probably Benign    08/04/2022, Right Diagnostic MMG with Froilan ([facility]):  On the present examination, there are stable round, milk of calcium and very faint calcifications with grouped distribution in the anterior one-third sub-areolar region of the right breast. No suspicious forms are seen at this time. There has been no significant change from the prior exam(s).  BI-RADS 3: Probably Benign    02/16/2023, Bilateral Diagnostic MMG with Froilan ([facility]):  There are scattered areas of fibroglandular density.  There are bilateral pre-pectoral saline implants.  1. On the present examination, there are stable milk of calcium calcifications with grouped distribution in the sub areolar region of the right breast.  2. There are round calcifications with diffuse distribution seen in both breasts. The parenchyma includes stable nodular asymmetries. There are no new suspicious masses, calcifications, or areas of architectural distortion.  3. There are areas of calcifications noted along both implant capsules.  BI-RADS 2: Benign Findings    02/28/2024, Screening MMG with Froilan ([facility]):  There are scattered areas of fibroglandular density.  There are bilateral pre-pectoral saline implants.  1. There are areas of calcification noted in both implants. There has been no significant change from the prior exam(s).  2. There are a few punctate calcifications with grouped distribution seen in the anterior one third region of the left  breast at 3 o'clock.  BI-RADS 0: Incomplete    03/20/2024, Left breast Digital Diagnostic MMG with Froilan ([facility]):  Additional evaluation was performed for the calcification in the left breast, 3 o'clock seen on 02/28/2024. On the present examination, there are amorphous calcifications with grouped distribution in the anterior one third region of the left breast at 3 o'clock.  BI-RADS 4B: Suspicious    04/15/2024, Left Breast, Stereotactic Biopsy ([facility]):  1.  Breast, left, 3:00, for calcifications: Breast tissue with               -A.  Incidental atypical lobular hyperplasia               -B.  Clustered microcysts with calcifications               -C.  Columnar cell change               -D.  Cholesterol clefting, hemosiderin and fat necrosis               -E.  Clustered apocrine cysts               -F.   Florid ductal hyperplasia of the usual type               -G.  Fibroadenomatoid hyperplasia  -Tophat clip. Concordant.       Review of Systems   Constitutional:  Negative for appetite change, chills, diaphoresis, fatigue, fever and unexpected weight change.   HENT:   Negative for hearing loss, lump/mass, mouth sores, nosebleeds, sore throat, tinnitus, trouble swallowing and voice change.    Eyes:  Negative for eye problems and icterus.   Respiratory:  Negative for chest tightness, cough, hemoptysis, shortness of breath and wheezing.    Cardiovascular:  Negative for chest pain, leg swelling and palpitations.   Gastrointestinal:  Negative for abdominal distention, abdominal pain, blood in stool, constipation, diarrhea, nausea, rectal pain and vomiting.   Endocrine: Negative for hot flashes.   Genitourinary:  Negative for bladder incontinence, difficulty urinating, dyspareunia, dysuria, frequency, hematuria, menstrual problem, nocturia, pelvic pain, vaginal bleeding and vaginal discharge.    Musculoskeletal:  Negative for arthralgias, back pain, flank pain, gait problem, myalgias, neck pain and neck stiffness.   Skin:   Negative for itching, rash and wound.   Neurological:  Negative for dizziness, extremity weakness, gait problem, headaches, light-headedness, numbness, seizures and speech difficulty.   Hematological:  Negative for adenopathy. Does not bruise/bleed easily.   Psychiatric/Behavioral:  Negative for confusion, decreased concentration, depression, sleep disturbance and suicidal ideas. The patient is not nervous/anxious.        Medications:    Current Outpatient Medications:   •  atorvastatin (LIPITOR) 20 MG tablet, Take 1 tablet by mouth Daily., Disp: 90 tablet, Rfl: 1  •  busPIRone (BUSPAR) 5 MG tablet, TAKE 1 TABLET THREE TIMES A DAY, Disp: 270 tablet, Rfl: 3  •  calcium carbonate-vitamin d 600-400 MG-UNIT per tablet, Take 1 tablet by mouth Daily As Needed., Disp: , Rfl:   •  Cetirizine HCl (ZYRTEC PO), Take 1 tablet by mouth daily., Disp: , Rfl:   •  fluticasone (FLONASE) 50 MCG/ACT nasal spray, 2 sprays by Each Nare route Daily., Disp: 16 mL, Rfl: 11  •  glucosamine-chondroitin 500-400 MG capsule capsule, Take 2 capsules by mouth Daily., Disp: , Rfl:   •  Multiple Vitamins tablet, Take by mouth., Disp: , Rfl:   •  vitamin C (ASCORBIC ACID) 500 MG tablet, Take 1 tablet by mouth Daily., Disp: , Rfl:   •  vitamin E 400 UNIT capsule, Take 1 capsule by mouth Daily., Disp: , Rfl:       Allergies   Allergen Reactions   • Ciprofloxacin        Past Medical History:   Diagnosis Date   • Acute conjunctivitis    • Allergic rhinitis    • Colon polyp 10/31/2017    , ascending colon tubular adenoma   • Hemorrhoids    • History of bone density study 2011    nl   • History of bone density study     nl 2017   • History of mitral valve prolapse     she was told this in the psat, but she had a normal echo in 2016   • Hyperplastic colon polyp      hyperplastic rectal and colon polyps,    • Pregnancy            Past Surgical History:   Procedure Laterality Date   • COLONOSCOPY W/ POLYPECTOMY  10/31/2017     , ascending colon 3 mm 10/31/2017, sigmoid colon (3mm) - hyperplastic polyp  2012       Family History   Problem Relation Age of Onset   • Macular degeneration Mother    • Hypertension Mother    • Dementia Mother    • Diabetes Father    • Heart disease Father    • Prostate cancer Father    • Heart disease Maternal Grandmother    • Heart disease Paternal Grandfather        Social History     Socioeconomic History   • Marital status:    • Number of children: 3   Tobacco Use   • Smoking status: Never   • Smokeless tobacco: Never   Vaping Use   • Vaping status: Never Used   Substance and Sexual Activity   • Alcohol use: Not Currently     Alcohol/week: 7.0 standard drinks of alcohol     Types: 7 Glasses of wine per week     Comment: social   • Drug use: No   • Sexual activity: Defer     Patient drinks 2 servings of caffeine per day.       GYNECOLOGIC HISTORY:   G: 3. P: 3. AB: 0.  Last menstrual period: Postmenopausal  Age at menarche: 12-13  Age at first childbirth: 20  Lactation/How lon mo  Age at menopause: late 40s  Total years of oral contraceptive use: couple years  Total years of hormone replacement therapy: 0      Objective  PHYSICAL EXAMINATION  Vitals:    05/15/24 1235   BP: 156/80   Pulse: 97   Resp: 17   SpO2: 97%     ECOG 0 - Asymptomatic  General: NAD, well appearing  Psych: a&o x 3, normal mood and affect  Eyes: EOMI, no scleral icterus  ENMT: neck supple without masses or thyromegaly, mucus membranes moist  Resp: normal effort, CTAB  CV: RRR, no murmurs, no edema  GI: soft, NT, ND  MSK: normal gait, normal ROM in bilateral shoulders  Lymph nodes: no cervical, supraclavicular or axillary lymphadenopathy  Breast: symmetric, moderate size, grade 3 ptosis, saline implants  Right: IMF scar, otherwise no visible abnormalities on inspection while seated, with arms raised or hands on hips. No masses or nipple abnormalities.  Left: IMF scar, otherwise no visible abnormalities on inspection  while seated, with arms raised or hands on hips. No masses or nipple abnormalities.      I have independently reviewed her imaging and here are my findings:   In the left breast at 3:00, there initially was a 3 mm cluster of calcifications.      Assessment & Plan  Assessment:   1. 74 y.o. F with a new diagnosis of left breast incidental atypical lobular hyperplasia, for which nonoperative management is recommended if MRI does not show any suspicious findings at this location.  2.  She has an increased lifetime risk of breast cancer (19%, TCv8, calculated 5/15/24) versus the average 75yo at 4%.    Discussion:  We reviewed her pathology and imaging reports. I explained that traditionally in the past, if ALH was found on core biopsy, a larger surgical excision of the area was recommended due to the low risk of upgrade (risk of identifying DCIS or invasive carcinoma within the vicinity of the lesion). However, more recent studies have shown that with an adequate biopsy sample (large core needle), clear radiologic-pathologic concordance, and no unassessed suspicious lesions on imaging (masses), the risk of upgrade is exceedingly low (0-9%). In these cases, routine excision is not necessary and surveillance is a reasonable option. If her MRI does not show any suspicious findings in this location, then we will proceed with observation.    I explained that the diagnosis of atypical hyperplasia increases her lifetime risk of breast cancer bilaterally. I explained that breast cancer risk is assessed by considering multiple factors, including her family history and personal history, which now includes the diagnosis of ALH. We discussed the various models for calculating breast cancer risk, that none of them are perfect and that some overestimate or underestimate. I calculated her lifetime risk of breast cancer, which is 19.3% (TCv8). We discussed that breast cancer risk needs to be continually reassessed throughout her  lifetime.    Plan:  -Breast MRI.  Follow-up after to review results.    Darlene Garrett MD    I spent 45 minutes caring for Verito on this date of service. This time includes time spent by me in the following activities: preparing for the visit, performing a medically appropriate examination and/or evaluation , counseling and educating the patient/family/caregiver, ordering medications, tests, or procedures, documenting information in the medical record, and independently interpreting results and communicating that information with the patient/family/caregiver.      CC:  MD Christa Dowling APRN

## 2024-05-15 NOTE — TELEPHONE ENCOUNTER
Provider: DR. BRANTLEY    Caller: Verito العراقي    Relationship: Self    Best call back number: [phone]    What is the best time to reach you: ANY    Who are you requesting to speak with (clinical staff, provider,  specific staff member): ANYONE    Do you know the name of the person who called: NO    What was the call regarding: UNKNOWN      Notes: PATIENT JUST MISSED A CALL FROM OFFICE BUT THERE ARE NO NOTES/ENCOUNTERS TO REFLECT A CALL.     PATIENT IS SCHEDULED TO SEE DR. BRANTLEY TODAY AT 1:00 PM.

## 2024-06-10 ENCOUNTER — HOSPITAL ENCOUNTER (OUTPATIENT)
Dept: MRI IMAGING | Facility: HOSPITAL | Age: 74
Discharge: HOME | End: 2024-06-10
Admitting: SURGERY
Payer: MEDICARE

## 2024-06-10 DIAGNOSIS — N64.59 OTHER SIGNS AND SYMPTOMS IN BREAST: ICD-10-CM

## 2024-06-10 DIAGNOSIS — N60.92 ATYPICAL LOBULAR HYPERPLASIA (ALH) OF LEFT BREAST: ICD-10-CM

## 2024-06-10 LAB — CREAT BLDA-MCNC: 0.8 MG/DL (ref 0.6–1.3)

## 2024-06-10 PROCEDURE — C8908 MRI W/O FOL W/CONT, BREAST,: HCPCS

## 2024-06-10 PROCEDURE — 82565 ASSAY OF CREATININE: CPT

## 2024-06-10 PROCEDURE — A9577 INJ MULTIHANCE: HCPCS | Performed by: SURGERY

## 2024-06-10 PROCEDURE — 0 GADOBENATE DIMEGLUMINE 529 MG/ML SOLUTION: Performed by: SURGERY

## 2024-06-10 PROCEDURE — C8937 CAD BREAST MRI: HCPCS

## 2024-06-10 RX ADMIN — GADOBENATE DIMEGLUMINE 16 ML: 529 INJECTION, SOLUTION INTRAVENOUS at 09:26

## 2024-06-13 ENCOUNTER — OFFICE VISIT (OUTPATIENT)
Dept: SURGERY | Facility: CLINIC | Age: 74
End: 2024-06-13
Payer: MEDICARE

## 2024-06-13 VITALS
HEART RATE: 90 BPM | WEIGHT: 177 LBS | BODY MASS INDEX: 32.57 KG/M2 | HEIGHT: 62 IN | SYSTOLIC BLOOD PRESSURE: 138 MMHG | OXYGEN SATURATION: 98 % | DIASTOLIC BLOOD PRESSURE: 82 MMHG | RESPIRATION RATE: 18 BRPM

## 2024-06-13 DIAGNOSIS — R92.8 ABNORMAL FINDING ON BREAST IMAGING: Primary | ICD-10-CM

## 2024-06-13 PROCEDURE — 1160F RVW MEDS BY RX/DR IN RCRD: CPT | Performed by: SURGERY

## 2024-06-13 PROCEDURE — 99213 OFFICE O/P EST LOW 20 MIN: CPT | Performed by: SURGERY

## 2024-06-13 PROCEDURE — 1159F MED LIST DOCD IN RCRD: CPT | Performed by: SURGERY

## 2024-06-13 NOTE — LETTER
June 13, 2024       No Recipients    Patient: Verito العراقي   YOB: 1950   Date of Visit: 6/13/2024     Dear Lisa Johnson MD:       Thank you for referring Verito العراقي to me for evaluation. Below are the relevant portions of my assessment and plan of care.    If you have questions, please do not hesitate to call me. I look forward to following Verito along with you.         Sincerely,        Darlene Garrett MD        CC:   No Recipients    Darlene Garrett MD  06/13/24 0854  Sign when Signing Visit  [facility]     Referring Provider: Lisa Johnson MD     Chief complaint: Follow-up breast MRI     Subjective  HPI:   5/15/2024:   Ms. Verito العراقي is a 73 yo woman, seen at the request of Dr. Lisa Johnson, for a new diagnosis of left breast atypical lobular hyperplasia (ALH). This was initially detected as an imaging abnormality on routine screening. Her work-up is detailed in the breast history section below. Prior to the biopsy, she denies any breast lumps, pain, skin changes, or nipple discharge.  She has right breast calcifications that were in imaging surveillance from 2559-8167.  She has prepectoral saline implants that were placed in her 40s.  She denies any prior breast biopsies. She denies any family history of breast or ovarian cancer.     6/13/2024, Interval History:  At her last visit we discussed the likely nonoperative management of her left breast ALH.  She underwent breast MRI which was negative on the left side, however showed 2 suspicious right breast masses.  She is here today to review the results.       Breast history/imaging (updated 6/13/2024):    12/20/2021, Screening MMG with Froilan ([facility]):  There are scattered areas of fibroglandular density.  There are bilateral pre-pectoral saline implants.  There are calcifications with grouped distribution seen in the CC view only seen in the sub areolar region of the right breast. These are seen best on the implant  displaced CC view.  In the left breast, no suspicious masses, significant calcifications, or other abnormalities are seen.  BI-RADS 0: Incomplete     02/02/2022, Right Diagnostic MMG with Froilan ([facility]):  There are scattered areas of fibroglandular density.  There are bilateral pre-pectoral saline implants.  On the present examination, there are round calcifications with grouped distribution in the sub areolar region of the right breast on the CC magnification view. These are favored to correlate with layering milk of calcium calcifications seen in the ML projection, localizing the group to 9:00. Vascular and implant capsule calcifications are also noted. No additional calcifications are seen. There are no suspicious masses noted.  BI-RADS 3: Probably Benign     08/04/2022, Right Diagnostic MMG with Froilan ([facility]):  On the present examination, there are stable round, milk of calcium and very faint calcifications with grouped distribution in the anterior one-third sub-areolar region of the right breast. No suspicious forms are seen at this time. There has been no significant change from the prior exam(s).  BI-RADS 3: Probably Benign     02/16/2023, Bilateral Diagnostic MMG with Froilan ([facility]):  There are scattered areas of fibroglandular density.  There are bilateral pre-pectoral saline implants.  1. On the present examination, there are stable milk of calcium calcifications with grouped distribution in the sub areolar region of the right breast.  2. There are round calcifications with diffuse distribution seen in both breasts. The parenchyma includes stable nodular asymmetries. There are no new suspicious masses, calcifications, or areas of architectural distortion.  3. There are areas of calcifications noted along both implant capsules.  BI-RADS 2: Benign Findings     02/28/2024, Screening MMG with Froilan ([facility]):  There are scattered areas of fibroglandular density.  There are bilateral pre-pectoral saline implants.  1. There are  areas of calcification noted in both implants. There has been no significant change from the prior exam(s).  2. There are a few punctate calcifications with grouped distribution seen in the anterior one third region of the left breast at 3 o'clock.  BI-RADS 0: Incomplete     03/20/2024, Left breast Digital Diagnostic MMG with Froilan ([facility]):  Additional evaluation was performed for the calcification in the left breast, 3 o'clock seen on 02/28/2024. On the present examination, there are amorphous calcifications with grouped distribution in the anterior one third region of the left breast at 3 o'clock.  BI-RADS 4B: Suspicious     04/15/2024, Left Breast, Stereotactic Biopsy ([facility]):  1.  Breast, left, 3:00, for calcifications: Breast tissue with               -A.  Incidental atypical lobular hyperplasia               -B.  Clustered microcysts with calcifications               -C.  Columnar cell change               -D.  Cholesterol clefting, hemosiderin and fat necrosis               -E.  Clustered apocrine cysts               -F.   Florid ductal hyperplasia of the usual type               -G.  Fibroadenomatoid hyperplasia  -Tophat clip. Concordant.      6/10/2024, Bilateral Breast MRI ([facility]):  There is scattered fibroglandular tissue. There is mild background parenchymal enhancement. There are bilateral prepectoral  saline breast implants.  RIGHT BREAST:    There is mild asymmetric upper inner right breast skin thickening and edema. At 9:00 in the middle right breast, 8.5 cm posterior to the nipple, there is a 1.2 x 0.9 x 1.2 cm enhancing mass. There is a possible correlate in the outer right breast on the XCCL view from 2/28/2024, which has been marked. At 8:00 in the posterior right breast, 9 cm posterior to the nipple, there is a similar-appearing 1.0 x 0.7 x 0.8 cm enhancing mass, which is in close proximity with the underlying implant capsule. These are suspicious. The visualized axilla is within normal  limits.  LEFT BREAST:    At 3:00 in the left breast, 3.4 cm posterior to the nipple, there is a focus of susceptibility from a biopsy clip marking the site of recent biopsy-proven atypical lobular hyperplasia. No suspicious enhancing mass or area of non-mass enhancement is identified at the biopsy site or elsewhere within the left breast. The visualized axilla is within normal limits.  EXTRAMAMMARY FINDINGS:  There are no pathologically enlarged internal mammary chain lymph nodes on either side.    BI-RADS 4: Suspicious       Review of Systems:  See interval history.       Medications:    Current Outpatient Medications:   •  atorvastatin (LIPITOR) 20 MG tablet, Take 1 tablet by mouth Daily., Disp: 90 tablet, Rfl: 1  •  busPIRone (BUSPAR) 5 MG tablet, TAKE 1 TABLET THREE TIMES A DAY, Disp: 270 tablet, Rfl: 3  •  calcium carbonate-vitamin d 600-400 MG-UNIT per tablet, Take 1 tablet by mouth Daily As Needed., Disp: , Rfl:   •  Cetirizine HCl (ZYRTEC PO), Take 1 tablet by mouth daily., Disp: , Rfl:   •  fluticasone (FLONASE) 50 MCG/ACT nasal spray, 2 sprays by Each Nare route Daily., Disp: 16 mL, Rfl: 11  •  glucosamine-chondroitin 500-400 MG capsule capsule, Take 2 capsules by mouth Daily., Disp: , Rfl:   •  Multiple Vitamins tablet, Take by mouth., Disp: , Rfl:   •  vitamin C (ASCORBIC ACID) 500 MG tablet, Take 1 tablet by mouth Daily., Disp: , Rfl:   •  vitamin E 400 UNIT capsule, Take 1 capsule by mouth Daily., Disp: , Rfl:     Allergies:  Allergies   Allergen Reactions   • Ciprofloxacin        Family History:  Family History   Problem Relation Age of Onset   • Macular degeneration Mother    • Hypertension Mother    • Dementia Mother    • Diabetes Father    • Heart disease Father    • Prostate cancer Father    • Heart disease Maternal Grandmother    • Heart disease Paternal Grandfather        Objective  PHYSICAL EXAMINATION:   Vitals:    06/13/24 0818   BP: 138/82   Pulse: 90   Resp: 18   SpO2: 98%     ECOG 0 -  Asymptomatic  General: NAD, well appearing  Psych: a&o x 3, normal mood and affect  Eyes: EOMI, no scleral icterus  ENMT: neck supple without masses or thyromegaly, mucus membranes moist  MSK: normal gait, normal ROM in bilateral shoulders  Lymph nodes: no cervical, supraclavicular or axillary lymphadenopathy  Breast: symmetric, moderate size, grade 3 ptosis, saline implants  Right: IMF scar, otherwise no visible abnormalities on inspection while seated, with arms raised or hands on hips. No masses or nipple abnormalities.  Left: IMF scar, otherwise no visible abnormalities on inspection while seated, with arms raised or hands on hips. No masses or nipple abnormalities.      I have independently reviewed her imaging and here are my findings:   In the left breast at 3:00, there initially was a 3 mm cluster of calcifications which represents biopsy-proven ALH.  MRI does not show any suspicious enhancement at this location.  In the right breast at 9:00, there is a 1.2 cm enhancing mass on MRI.  In the right breast at 8:00, there is a similar-appearing 1 cm enhancing mass in close proximity with the underlying implant capsule.      Assessment & Plan  Assessment:   1. 74 y.o. F with a new diagnosis of 2 suspicious right breast masses on MRI.  2.  She also has a diagnosis of left breast incidental atypical lobular hyperplasia, for which nonoperative management is recommended.  3. She has an increased lifetime risk of breast cancer (19%, TCv8, calculated 5/15/24) versus the average 73yo at 4%.     Discussion:  BMI is >= 30 and <35. (Class 1 Obesity). The following options were offered after discussion;: weight loss educational material (shared in after visit summary) and information on healthy weight added to patient's after visit summary      Plan:  -Right diagnostic mammogram and ultrasound, followed by biopsy.  Follow-up with me after biopsies.    Darlene Garrett MD      CC:  MD Christa Dowling APRN

## 2024-06-13 NOTE — PROGRESS NOTES
[facility]     Referring Provider: Lisa Johnson MD     Chief complaint: Follow-up breast MRI     Subjective   HPI:   5/15/2024:   Ms. Verito العراقي is a 73 yo woman, seen at the request of Dr. Lisa Johnson, for a new diagnosis of left breast atypical lobular hyperplasia (ALH). This was initially detected as an imaging abnormality on routine screening. Her work-up is detailed in the breast history section below. Prior to the biopsy, she denies any breast lumps, pain, skin changes, or nipple discharge.  She has right breast calcifications that were in imaging surveillance from 7828-8062.  She has prepectoral saline implants that were placed in her 40s.  She denies any prior breast biopsies. She denies any family history of breast or ovarian cancer.     6/13/2024, Interval History:  At her last visit we discussed the likely nonoperative management of her left breast ALH.  She underwent breast MRI which was negative on the left side, however showed 2 suspicious right breast masses.  She is here today to review the results.       Breast history/imaging (updated 6/13/2024):    12/20/2021, Screening MMG with Froilan ([facility]):  There are scattered areas of fibroglandular density.  There are bilateral pre-pectoral saline implants.  There are calcifications with grouped distribution seen in the CC view only seen in the sub areolar region of the right breast. These are seen best on the implant displaced CC view.  In the left breast, no suspicious masses, significant calcifications, or other abnormalities are seen.  BI-RADS 0: Incomplete     02/02/2022, Right Diagnostic MMG with Froilan ([facility]):  There are scattered areas of fibroglandular density.  There are bilateral pre-pectoral saline implants.  On the present examination, there are round calcifications with grouped distribution in the sub areolar region of the right breast on the CC magnification view. These are favored to correlate with layering milk of calcium  calcifications seen in the ML projection, localizing the group to 9:00. Vascular and implant capsule calcifications are also noted. No additional calcifications are seen. There are no suspicious masses noted.  BI-RADS 3: Probably Benign     08/04/2022, Right Diagnostic MMG with Froilan ([facility]):  On the present examination, there are stable round, milk of calcium and very faint calcifications with grouped distribution in the anterior one-third sub-areolar region of the right breast. No suspicious forms are seen at this time. There has been no significant change from the prior exam(s).  BI-RADS 3: Probably Benign     02/16/2023, Bilateral Diagnostic MMG with Froilan ([facility]):  There are scattered areas of fibroglandular density.  There are bilateral pre-pectoral saline implants.  1. On the present examination, there are stable milk of calcium calcifications with grouped distribution in the sub areolar region of the right breast.  2. There are round calcifications with diffuse distribution seen in both breasts. The parenchyma includes stable nodular asymmetries. There are no new suspicious masses, calcifications, or areas of architectural distortion.  3. There are areas of calcifications noted along both implant capsules.  BI-RADS 2: Benign Findings     02/28/2024, Screening MMG with Froilan ([facility]):  There are scattered areas of fibroglandular density.  There are bilateral pre-pectoral saline implants.  1. There are areas of calcification noted in both implants. There has been no significant change from the prior exam(s).  2. There are a few punctate calcifications with grouped distribution seen in the anterior one third region of the left breast at 3 o'clock.  BI-RADS 0: Incomplete     03/20/2024, Left breast Digital Diagnostic MMG with Froilan ([facility]):  Additional evaluation was performed for the calcification in the left breast, 3 o'clock seen on 02/28/2024. On the present examination, there are amorphous calcifications with grouped  distribution in the anterior one third region of the left breast at 3 o'clock.  BI-RADS 4B: Suspicious     04/15/2024, Left Breast, Stereotactic Biopsy ([facility]):  1.  Breast, left, 3:00, for calcifications: Breast tissue with               -A.  Incidental atypical lobular hyperplasia               -B.  Clustered microcysts with calcifications               -C.  Columnar cell change               -D.  Cholesterol clefting, hemosiderin and fat necrosis               -E.  Clustered apocrine cysts               -F.   Florid ductal hyperplasia of the usual type               -G.  Fibroadenomatoid hyperplasia  -Tophat clip. Concordant.      6/10/2024, Bilateral Breast MRI ([facility]):  There is scattered fibroglandular tissue. There is mild background parenchymal enhancement. There are bilateral prepectoral  saline breast implants.  RIGHT BREAST:    There is mild asymmetric upper inner right breast skin thickening and edema. At 9:00 in the middle right breast, 8.5 cm posterior to the nipple, there is a 1.2 x 0.9 x 1.2 cm enhancing mass. There is a possible correlate in the outer right breast on the XCCL view from 2/28/2024, which has been marked. At 8:00 in the posterior right breast, 9 cm posterior to the nipple, there is a similar-appearing 1.0 x 0.7 x 0.8 cm enhancing mass, which is in close proximity with the underlying implant capsule. These are suspicious. The visualized axilla is within normal limits.  LEFT BREAST:    At 3:00 in the left breast, 3.4 cm posterior to the nipple, there is a focus of susceptibility from a biopsy clip marking the site of recent biopsy-proven atypical lobular hyperplasia. No suspicious enhancing mass or area of non-mass enhancement is identified at the biopsy site or elsewhere within the left breast. The visualized axilla is within normal limits.  EXTRAMAMMARY FINDINGS:  There are no pathologically enlarged internal mammary chain lymph nodes on either side.    BI-RADS 4: Suspicious        Review of Systems:  See interval history.       Medications:    Current Outpatient Medications:     atorvastatin (LIPITOR) 20 MG tablet, Take 1 tablet by mouth Daily., Disp: 90 tablet, Rfl: 1    busPIRone (BUSPAR) 5 MG tablet, TAKE 1 TABLET THREE TIMES A DAY, Disp: 270 tablet, Rfl: 3    calcium carbonate-vitamin d 600-400 MG-UNIT per tablet, Take 1 tablet by mouth Daily As Needed., Disp: , Rfl:     Cetirizine HCl (ZYRTEC PO), Take 1 tablet by mouth daily., Disp: , Rfl:     fluticasone (FLONASE) 50 MCG/ACT nasal spray, 2 sprays by Each Nare route Daily., Disp: 16 mL, Rfl: 11    glucosamine-chondroitin 500-400 MG capsule capsule, Take 2 capsules by mouth Daily., Disp: , Rfl:     Multiple Vitamins tablet, Take by mouth., Disp: , Rfl:     vitamin C (ASCORBIC ACID) 500 MG tablet, Take 1 tablet by mouth Daily., Disp: , Rfl:     vitamin E 400 UNIT capsule, Take 1 capsule by mouth Daily., Disp: , Rfl:     Allergies:  Allergies   Allergen Reactions    Ciprofloxacin        Family History:  Family History   Problem Relation Age of Onset    Macular degeneration Mother     Hypertension Mother     Dementia Mother     Diabetes Father     Heart disease Father     Prostate cancer Father     Heart disease Maternal Grandmother     Heart disease Paternal Grandfather        Objective   PHYSICAL EXAMINATION:   Vitals:    06/13/24 0818   BP: 138/82   Pulse: 90   Resp: 18   SpO2: 98%     ECOG 0 - Asymptomatic  General: NAD, well appearing  Psych: a&o x 3, normal mood and affect  Eyes: EOMI, no scleral icterus  ENMT: neck supple without masses or thyromegaly, mucus membranes moist  MSK: normal gait, normal ROM in bilateral shoulders  Lymph nodes: no cervical, supraclavicular or axillary lymphadenopathy  Breast: symmetric, moderate size, grade 3 ptosis, saline implants  Right: IMF scar, otherwise no visible abnormalities on inspection while seated, with arms raised or hands on hips. No masses or nipple abnormalities.  Left: IMF scar,  otherwise no visible abnormalities on inspection while seated, with arms raised or hands on hips. No masses or nipple abnormalities.      I have independently reviewed her imaging and here are my findings:   In the left breast at 3:00, there initially was a 3 mm cluster of calcifications which represents biopsy-proven ALH.  MRI does not show any suspicious enhancement at this location.  In the right breast at 9:00, there is a 1.2 cm enhancing mass on MRI.  In the right breast at 8:00, there is a similar-appearing 1 cm enhancing mass in close proximity with the underlying implant capsule.      Assessment & Plan   Assessment:   1. 74 y.o. F with a new diagnosis of 2 suspicious right breast masses on MRI.  2.  She also has a diagnosis of left breast incidental atypical lobular hyperplasia, for which nonoperative management is recommended.  3. She has an increased lifetime risk of breast cancer (19%, TCv8, calculated 5/15/24) versus the average 73yo at 4%.     Discussion:  BMI is >= 30 and <35. (Class 1 Obesity). The following options were offered after discussion;: weight loss educational material (shared in after visit summary) and information on healthy weight added to patient's after visit summary      Plan:  -Right diagnostic mammogram and ultrasound, followed by biopsy.  Follow-up with me after biopsies.    Darlene Garrett MD      CC:  MD Christa Dowling APRN

## 2024-06-17 DIAGNOSIS — E78.00 PURE HYPERCHOLESTEROLEMIA: ICD-10-CM

## 2024-06-17 RX ORDER — ATORVASTATIN CALCIUM 20 MG/1
20 TABLET, FILM COATED ORAL DAILY
Qty: 90 TABLET | Refills: 3 | Status: SHIPPED | OUTPATIENT
Start: 2024-06-17

## 2024-07-09 ENCOUNTER — HOSPITAL ENCOUNTER (OUTPATIENT)
Dept: ULTRASOUND IMAGING | Facility: HOSPITAL | Age: 74
Discharge: HOME | End: 2024-07-09
Payer: MEDICARE

## 2024-07-09 ENCOUNTER — HOSPITAL ENCOUNTER (OUTPATIENT)
Dept: MAMMOGRAPHY | Facility: HOSPITAL | Age: 74
Discharge: HOME | End: 2024-07-09
Payer: MEDICARE

## 2024-07-09 DIAGNOSIS — R92.8 ABNORMAL FINDING ON BREAST IMAGING: ICD-10-CM

## 2024-07-09 PROCEDURE — G0279 TOMOSYNTHESIS, MAMMO: HCPCS

## 2024-07-09 PROCEDURE — 76642 ULTRASOUND BREAST LIMITED: CPT

## 2024-07-09 PROCEDURE — 77065 DX MAMMO INCL CAD UNI: CPT

## 2024-07-25 ENCOUNTER — OFFICE VISIT (OUTPATIENT)
Dept: SURGERY | Facility: CLINIC | Age: 74
End: 2024-07-25
Payer: MEDICARE

## 2024-07-25 ENCOUNTER — TELEPHONE (OUTPATIENT)
Dept: SURGERY | Facility: CLINIC | Age: 74
End: 2024-07-25
Payer: MEDICARE

## 2024-07-25 ENCOUNTER — HOSPITAL ENCOUNTER (OUTPATIENT)
Dept: SURGERY | Facility: HOSPITAL | Age: 74
Discharge: HOME | End: 2024-07-25
Payer: MEDICARE

## 2024-07-25 VITALS
WEIGHT: 177 LBS | RESPIRATION RATE: 17 BRPM | SYSTOLIC BLOOD PRESSURE: 140 MMHG | HEIGHT: 62 IN | OXYGEN SATURATION: 99 % | HEART RATE: 92 BPM | BODY MASS INDEX: 32.57 KG/M2 | DIASTOLIC BLOOD PRESSURE: 80 MMHG

## 2024-07-25 DIAGNOSIS — R92.8 ABNORMAL FINDING ON BREAST IMAGING: ICD-10-CM

## 2024-07-25 DIAGNOSIS — N63.10 MASS OF RIGHT BREAST, UNSPECIFIED QUADRANT: Primary | ICD-10-CM

## 2024-07-25 PROCEDURE — 99213 OFFICE O/P EST LOW 20 MIN: CPT | Performed by: SURGERY

## 2024-07-25 PROCEDURE — 1160F RVW MEDS BY RX/DR IN RCRD: CPT | Performed by: SURGERY

## 2024-07-25 PROCEDURE — 88342 IMHCHEM/IMCYTCHM 1ST ANTB: CPT | Performed by: SURGERY

## 2024-07-25 PROCEDURE — 19083 BX BREAST 1ST LESION US IMAG: CPT | Performed by: SURGERY

## 2024-07-25 PROCEDURE — 1159F MED LIST DOCD IN RCRD: CPT | Performed by: SURGERY

## 2024-07-25 PROCEDURE — 88305 TISSUE EXAM BY PATHOLOGIST: CPT | Performed by: SURGERY

## 2024-07-25 PROCEDURE — 19084 BX BREAST ADD LESION US IMAG: CPT | Performed by: SURGERY

## 2024-07-25 PROCEDURE — 88360 TUMOR IMMUNOHISTOCHEM/MANUAL: CPT | Performed by: SURGERY

## 2024-07-25 PROCEDURE — 88341 IMHCHEM/IMCYTCHM EA ADD ANTB: CPT | Performed by: SURGERY

## 2024-07-25 NOTE — LETTER
July 25, 2024       No Recipients    Patient: Verito العراقي   YOB: 1950   Date of Visit: 7/25/2024     Dear Lisa Johnson MD:       Thank you for referring Verito العراقي to me for evaluation. Below are the relevant portions of my assessment and plan of care.    If you have questions, please do not hesitate to call me. I look forward to following Verito along with you.         Sincerely,        Darlene Garrett MD        CC:   No Recipients    Darlene Garrett MD  07/25/24 1322  Sign when Signing Visit  [facility]     Referring Provider: Lisa Johnson MD     Chief complaint: Follow-up breast imaging, in office biopsy   Subjective  HPI:   5/15/2024:   Ms. Verito العراقي is a 73 yo woman, seen at the request of Dr. Lisa Johnson, for a new diagnosis of left breast atypical lobular hyperplasia (ALH). This was initially detected as an imaging abnormality on routine screening. Her work-up is detailed in the breast history section below. Prior to the biopsy, she denies any breast lumps, pain, skin changes, or nipple discharge.  She has right breast calcifications that were in imaging surveillance from 1734-6969.  She has prepectoral saline implants that were placed in her 40s.  She denies any prior breast biopsies. She denies any family history of breast or ovarian cancer.     6/13/2024:  At her last visit we discussed the likely nonoperative management of her left breast ALH.  She underwent breast MRI which was negative on the left side, however showed 2 suspicious right breast masses.  She is here today to review the results.    7/25/2024, Interval History:  Ultrasound showed 2 correlates for the MRI masses.  One of them is overlying the implant.        Breast history/imaging (updated 7/25/2024):    12/20/2021, Screening MMG with Froilan ([facility]):  There are scattered areas of fibroglandular density.  There are bilateral pre-pectoral saline implants.  There are calcifications with grouped  distribution seen in the CC view only seen in the sub areolar region of the right breast. These are seen best on the implant displaced CC view.  In the left breast, no suspicious masses, significant calcifications, or other abnormalities are seen.  BI-RADS 0: Incomplete     02/02/2022, Right Diagnostic MMG with Froilan ([facility]):  There are scattered areas of fibroglandular density.  There are bilateral pre-pectoral saline implants.  On the present examination, there are round calcifications with grouped distribution in the sub areolar region of the right breast on the CC magnification view. These are favored to correlate with layering milk of calcium calcifications seen in the ML projection, localizing the group to 9:00. Vascular and implant capsule calcifications are also noted. No additional calcifications are seen. There are no suspicious masses noted.  BI-RADS 3: Probably Benign     08/04/2022, Right Diagnostic MMG with Froilan ([facility]):  On the present examination, there are stable round, milk of calcium and very faint calcifications with grouped distribution in the anterior one-third sub-areolar region of the right breast. No suspicious forms are seen at this time. There has been no significant change from the prior exam(s).  BI-RADS 3: Probably Benign     02/16/2023, Bilateral Diagnostic MMG with Froilan ([facility]):  There are scattered areas of fibroglandular density.  There are bilateral pre-pectoral saline implants.  1. On the present examination, there are stable milk of calcium calcifications with grouped distribution in the sub areolar region of the right breast.  2. There are round calcifications with diffuse distribution seen in both breasts. The parenchyma includes stable nodular asymmetries. There are no new suspicious masses, calcifications, or areas of architectural distortion.  3. There are areas of calcifications noted along both implant capsules.  BI-RADS 2: Benign Findings     02/28/2024, Screening MMG with Froilan  ([facility]):  There are scattered areas of fibroglandular density.  There are bilateral pre-pectoral saline implants.  1. There are areas of calcification noted in both implants. There has been no significant change from the prior exam(s).  2. There are a few punctate calcifications with grouped distribution seen in the anterior one third region of the left breast at 3 o'clock.  BI-RADS 0: Incomplete     03/20/2024, Left breast Digital Diagnostic MMG with Froilan ([facility]):  Additional evaluation was performed for the calcification in the left breast, 3 o'clock seen on 02/28/2024. On the present examination, there are amorphous calcifications with grouped distribution in the anterior one third region of the left breast at 3 o'clock.  BI-RADS 4B: Suspicious     04/15/2024, Left Breast, Stereotactic Biopsy ([facility]):  1.  Breast, left, 3:00, for calcifications: Breast tissue with               -A.  Incidental atypical lobular hyperplasia               -B.  Clustered microcysts with calcifications               -C.  Columnar cell change               -D.  Cholesterol clefting, hemosiderin and fat necrosis               -E.  Clustered apocrine cysts               -F.   Florid ductal hyperplasia of the usual type               -G.  Fibroadenomatoid hyperplasia  -Tophat clip. Concordant.      6/10/2024, Bilateral Breast MRI ( [facility]):  There is scattered fibroglandular tissue. There is mild background parenchymal enhancement. There are bilateral prepectoral  saline breast implants.  RIGHT BREAST:    There is mild asymmetric upper inner right breast skin thickening and edema. At 9:00 in the middle right breast, 8.5 cm posterior to the nipple, there is a 1.2 x 0.9 x 1.2 cm enhancing mass. There is a possible correlate in the outer right breast on the XCCL view from 2/28/2024, which has been marked. At 8:00 in the posterior right breast, 9 cm posterior to the nipple, there is a similar-appearing 1.0 x 0.7 x 0.8 cm enhancing mass, which  is in close proximity with the underlying implant capsule. These are suspicious. The visualized axilla is within normal limits.  LEFT BREAST:    At 3:00 in the left breast, 3.4 cm posterior to the nipple, there is a focus of susceptibility from a biopsy clip marking the site of recent biopsy-proven atypical lobular hyperplasia. No suspicious enhancing mass or area of non-mass enhancement is identified at the biopsy site or elsewhere within the left breast. The visualized axilla is within normal limits.  EXTRAMAMMARY FINDINGS:  There are no pathologically enlarged internal mammary chain lymph nodes on either side.    BI-RADS 4: Suspicious     7/9/2024, Right Diagnostic MMG with Froilan & Right Breast Limited US ( [facility]):  MMG:  The more anteriorly positioned 1.2 cm mass in the mid to posterior depth outer right breast is definitively seen only on the implant displaced extended CC view. The masses are otherwise likely obscured by the breast implant on the views without implant displacement. Partially imaged breast implant. No concerning microcalcification or architectural distortion.  US:  Multiplanar grayscale and color-flow imaging of the right breast was performed. Correlating with the MRI abnormality at the 9 o'clock position of the right breast 8 cm from the nipple, there is a well-circumscribed ovoid wider than tall heterogeneous probably hypoechoic mass measuring 1.2 cm x 0.9 cm x 0.5 cm superficial to the partially imaged breast implant. At the 9:30 position approximately 9 cm from the nipple, there is an additional hypoechoic mass measuring 0.6 cm x 0.6 cm x 0.5 cm, also superficial to the breast implant. No significant shadowing.  IMPRESSION:  The 2 small masses seen on the prior MRI study in the lateral right breast superficial to the breast implant are visualized on ultrasound.  Recommend ultrasound-guided right breast biopsy.  BI-RADS 4. Suspicious.      Review of Systems:  See interval history.        Medications:    Current Outpatient Medications:   •  atorvastatin (LIPITOR) 20 MG tablet, TAKE 1 TABLET DAILY, Disp: 90 tablet, Rfl: 3  •  busPIRone (BUSPAR) 5 MG tablet, TAKE 1 TABLET THREE TIMES A DAY, Disp: 270 tablet, Rfl: 3  •  calcium carbonate-vitamin d 600-400 MG-UNIT per tablet, Take 1 tablet by mouth Daily As Needed., Disp: , Rfl:   •  Cetirizine HCl (ZYRTEC PO), Take 1 tablet by mouth daily., Disp: , Rfl:   •  fluticasone (FLONASE) 50 MCG/ACT nasal spray, 2 sprays by Each Nare route Daily., Disp: 16 mL, Rfl: 11  •  glucosamine-chondroitin 500-400 MG capsule capsule, Take 2 capsules by mouth Daily., Disp: , Rfl:   •  Multiple Vitamins tablet, Take by mouth., Disp: , Rfl:   •  vitamin C (ASCORBIC ACID) 500 MG tablet, Take 1 tablet by mouth Daily., Disp: , Rfl:   •  vitamin E 400 UNIT capsule, Take 1 capsule by mouth Daily., Disp: , Rfl:       Allergies   Allergen Reactions   • Ciprofloxacin        Family History   Problem Relation Age of Onset   • Macular degeneration Mother    • Hypertension Mother    • Dementia Mother    • Diabetes Father    • Heart disease Father    • Prostate cancer Father    • Heart disease Maternal Grandmother    • Heart disease Paternal Grandfather        Objective  PHYSICAL EXAMINATION:   Vitals:    07/25/24 1204   BP: 140/80   Pulse: 92   Resp: 17   SpO2: 99%   ECOG 0 - Asymptomatic  General: NAD, well appearing  Psych: a&o x 3, normal mood and affect  Eyes: EOMI, no scleral icterus  ENMT: neck supple without masses or thyromegaly, mucus membranes moist  MSK: normal gait, normal ROM in bilateral shoulders  Lymph nodes: no cervical, supraclavicular or axillary lymphadenopathy  Breast: symmetric, moderate size, grade 3 ptosis, saline implants  Right: IMF scar, otherwise no visible abnormalities on inspection while seated, with arms raised or hands on hips. No obvious masses or nipple abnormalities.  Left: IMF scar, otherwise no visible abnormalities on inspection while seated,  with arms raised or hands on hips. No masses or nipple abnormalities.      I have independently reviewed her imaging and here are my findings:   In the left breast at 3:00, there initially was a 3 mm cluster of calcifications which represents biopsy-proven ALH.  MRI does not show any suspicious enhancement at this location.  In the lateral right breast on ultrasound there is a 12 mm mass in 6 mm mass.  The larger mass is abutting the implant capsule.      PROCEDURE: Percutaneous ultrasound-guided vacuum-assisted core breast biopsy x 2  Indication: Ultrasound-visible breast masses  Location:   Right breast, 10:00, 9 cm from the nipple  Right breast, 9:30, 8 cm from the nipple  Consent: The risks, benefits, and alternatives to the procedure were discussed with the patient, who understood and wished to proceed. The risks described included, but were not limited to, implant rupture, bleeding, infection, pneumothorax, and inadequate sampling requiring either repeat percutaneous or open excisional biopsy.  Description of Right Breast Diagnostic Ultrasound: After the patient was positioned supine on the procedure table, I located the lesions using ultrasound. Using a 10MHz linear transducer, I scanned the breast at the area of interest. At 10:00, 9 cm FN, there is an irregular hypoechoic mass.  At 930, 8 cm FN, there is a large irregular hypoechoic mass, located just above the implant capsule and possibly partially within it.  These are both amenable to ultrasound-guided core biopsy.   Description of Percutaneous Biopsies:   Right breast, 10:00, 9 cm from the nipple:  The area was prepped with alcohol and draped in sterile fashion. I anesthetized the breast skin at the site of anticipated mammotomy with 1% lidocaine with epinephrine. I then anesthetized the underlying subcutaneous tissue and breast parenchyma surrounding the lesion with 1% lidocaine with epinephrine under ultrasound guidance. In total, 12 mL of local  anesthetic was used. I then made a nicking incision with an 11 blade and inserted the 14 G Bard Marquee biopsy device through the lesion from lateral to medial under ultrasound guidance. I took 3 core samples of the lesion under direct visualization with ultrasound. I withdrew the biopsy device and placed a hydromark marker into the biopsy site. The marker was visualized within the lesion after placement.   2.   Right breast, 9:30, 8 cm from the nipple  The area was prepped with alcohol and draped in sterile fashion. I anesthetized the breast skin at the site of anticipated mammotomy with 1% lidocaine with epinephrine. I then anesthetized the underlying subcutaneous tissue and breast parenchyma surrounding the lesion with 1% lidocaine with epinephrine under ultrasound guidance. In total, 20 mL of local anesthetic was used. I then made a nicking incision with an 11 blade and inserted the 14 G Bard Marquee biopsy device through the lesion from lateral to medial under ultrasound guidance.  I initially attempted 2 core samples, however this was technically very difficult since I was unable to displace her implant.  Neither sample was adequate and neither sample appeared to contain the lesion.  I then made an additional nicking incision more laterally and reinserted the biopsy device coming in at a more shallow angle.  With this approach the needle appeared to chow the lesion.  I successfully took 1 core sample under ultrasound visualization into this appeared to contain the mass. I withdrew the biopsy device and placed a hydromark marker into the biopsy site. The marker was visualized within the lesion after placement.   We held manual compression at each site for 10 minutes, placed steri-strips at the mammotomy sites, and wrapped the patient in a 6 inch ace wrap and a cold pack.  Marker placed: Hydromark  Tolerance: The patient tolerated the procedure well.  Disposition: See below.      Assessment & Plan  Assessment:    1. 74 y.o. F with 2 suspicious right breast masses seen on MRI and ultrasound.  2.  She also has a diagnosis of left breast incidental atypical lobular hyperplasia, for which nonoperative management is recommended.  3. She has an increased lifetime risk of breast cancer (19%, TCv8, calculated 5/15/24) versus the average 73yo at 4%.      Plan:  -Biopsies performed today.  I will call her with results in 2 days and determine follow-up/management after that.    Darlene Garrett MD      CC:  MD Christa Dowling APRN

## 2024-07-25 NOTE — TELEPHONE ENCOUNTER
Pt notified her appointment for today has been moved to 12:00 due to a scheduling conflict.  Pt verbalized understanding, she will be here at noon.     CMA

## 2024-07-29 ENCOUNTER — TELEPHONE (OUTPATIENT)
Dept: SURGERY | Facility: CLINIC | Age: 74
End: 2024-07-29
Payer: MEDICARE

## 2024-07-29 LAB
LAB AP CASE REPORT: NORMAL
LAB AP CLINICAL INFORMATION: NORMAL
LAB AP DIAGNOSIS COMMENT: NORMAL
LAB AP SPECIAL STAINS: NORMAL
LAB AP SYNOPTIC CHECKLIST: NORMAL
PATH REPORT.FINAL DX SPEC: NORMAL
PATH REPORT.GROSS SPEC: NORMAL

## 2024-07-29 NOTE — TELEPHONE ENCOUNTER
I called Ms. العراقي to let her know that the right breast biopsies unfortunately showed breast cancer.  I am transferring her care to Dr. Mosley since I will be going on maternity leave shortly.  I also already discussed her case with Dr. Mosley.  She will see the patient in 2 days.     Darlene Garrett MD

## 2024-07-30 PROBLEM — C50.411 MALIGNANT NEOPLASM OF UPPER-OUTER QUADRANT OF RIGHT BREAST IN FEMALE, ESTROGEN RECEPTOR POSITIVE: Status: ACTIVE | Noted: 2024-07-30

## 2024-07-30 PROBLEM — Z17.0 MALIGNANT NEOPLASM OF UPPER-OUTER QUADRANT OF RIGHT BREAST IN FEMALE, ESTROGEN RECEPTOR POSITIVE: Status: ACTIVE | Noted: 2024-07-30

## 2024-07-30 NOTE — PROGRESS NOTES
[facility]     Referring Provider: Darlene Garrett MD     Chief complaint: newly diagnosed breast cancer    Subjective    HPI: Ms. Verito العراقي is a 74 y.o. yo woman, seen at the request of Dr. Garrett for a new diagnosis of right breast cancer.  She has been followed by Dr. Garrett for atypical lobular hyperplasia diagnosed earlier this year.  On MRI for high risk she was found to have 2 concerning masses within her right breast.    She underwent bilateral breast augmentation most recently with prepectoral saline implants placed.  Her last surgery she estimates was approximately 20 years ago.    She reports she has a pertinent PMH of hyperlipidemia, anxiety, history of diastolic dysfunction and mitral valve prolapse.     She was joined today in clinic by her . They all participated in the discussion, after her examination, and she gave her consent for them to participate.       Oncology/Hematology History   Malignant neoplasm of upper-outer quadrant of right breast in female, estrogen receptor positive   7/25/2024 Cancer Staged    Staging form: Breast, AJCC 8th Edition  - Clinical stage from 7/25/2024: Stage IA (cT1c, cN0, cM0, G2, ER+, NV+, HER2-) - Signed by Marilia Mosley MD on 8/1/2024 7/30/2024 Initial Diagnosis    Malignant neoplasm of upper-outer quadrant of right breast in female, estrogen receptor positive         Review of Systems   Constitutional:  Negative for appetite change, fatigue and fever.   HENT:  Negative.     Eyes: Negative.    Respiratory:  Negative for cough and shortness of breath.    Cardiovascular: Negative.    Gastrointestinal:  Negative for abdominal distention, diarrhea and nausea.   Endocrine: Negative.    Genitourinary: Negative.     Musculoskeletal:  Negative for arthralgias and back pain.   Skin: Negative.    Neurological:  Negative for dizziness, headaches and speech difficulty.   Hematological: Negative.    Psychiatric/Behavioral:  Negative for decreased  concentration and sleep disturbance. The patient is nervous/anxious.        Medications:    Current Outpatient Medications:     atorvastatin (LIPITOR) 20 MG tablet, TAKE 1 TABLET DAILY, Disp: 90 tablet, Rfl: 3    busPIRone (BUSPAR) 5 MG tablet, TAKE 1 TABLET THREE TIMES A DAY, Disp: 270 tablet, Rfl: 3    calcium carbonate-vitamin d 600-400 MG-UNIT per tablet, Take 1 tablet by mouth Daily As Needed., Disp: , Rfl:     Cetirizine HCl (ZYRTEC PO), Take 1 tablet by mouth daily., Disp: , Rfl:     fluticasone (FLONASE) 50 MCG/ACT nasal spray, 2 sprays by Each Nare route Daily., Disp: 16 mL, Rfl: 11    glucosamine-chondroitin 500-400 MG capsule capsule, Take 2 capsules by mouth Daily., Disp: , Rfl:     Multiple Vitamins tablet, Take by mouth., Disp: , Rfl:     vitamin C (ASCORBIC ACID) 500 MG tablet, Take 1 tablet by mouth Daily., Disp: , Rfl:     vitamin E 400 UNIT capsule, Take 1 capsule by mouth Daily., Disp: , Rfl:     Allergies:  Allergies   Allergen Reactions    Ciprofloxacin        Medical history:  Past Medical History:   Diagnosis Date    Acute conjunctivitis     Allergic rhinitis     Colon polyp 10/31/2017    , ascending colon tubular adenoma    Hemorrhoids     History of bone density study 2011    nl    History of bone density study     nl 2017    History of mitral valve prolapse     she was told this in the psat, but she had a normal echo in 2016    Hyperplastic colon polyp      hyperplastic rectal and colon polyps,     Pregnancy            Surgical History:  Past Surgical History:   Procedure Laterality Date    COLONOSCOPY W/ POLYPECTOMY  10/31/2017    , ascending colon 3 mm 10/31/2017, sigmoid colon (3mm) - hyperplastic polyp  2012       Family History:  Family History   Problem Relation Age of Onset    Macular degeneration Mother     Hypertension Mother     Dementia Mother     Diabetes Father     Heart disease Father     Prostate cancer Father     Heart disease  Maternal Grandmother     Heart disease Paternal Grandfather        Family Cancer History: relationship - (age of diagnosis/current age or age at death)  Breast: N/A   Ovarian: N/A  Colon: N/A  Pancreas: N/A  Prostate: N/A    Social History:   Social History     Socioeconomic History    Marital status:     Number of children: 3   Tobacco Use    Smoking status: Never    Smokeless tobacco: Never   Vaping Use    Vaping status: Never Used   Substance and Sexual Activity    Alcohol use: Not Currently     Alcohol/week: 7.0 standard drinks of alcohol     Types: 7 Glasses of wine per week     Comment: social    Drug use: No    Sexual activity: Defer       She lives with her .  She is a retired teacher.      GYNECOLOGIC HISTORY:   G: 3. P: 3. AB: 0.  Last menstrual period: in her 40's  Age at menarche: 12  Age at first childbirth: 20  Lactation/How lon month  Age at menopause: in her 40's  Total years of oral contraceptive use: 2-3 years  Total years of hormone replacement therapy: 0      Objective   PHYSICAL EXAMINATION:   Vitals:    24 1257   BP: 138/82   Pulse: 86   Resp: 18   SpO2: 97%     Examination chaperoned by Valerie.  ECOG 0 - Asymptomatic  General: NAD, well appearing  Psych: a&o x 3, normal mood and affect  Eyes: EOMI, no scleral icterus  ENMT: neck supple without masses or thyromegaly, mucus membranes moist  Resp: normal effort  CV: RRR, no edema   GI: soft, NT, ND  MSK: normal gait, normal ROM in bilateral shoulders  Lymph nodes:  no cervical, supraclavicular or axillary lymphadenopathy  Breast: symmetric, pendulous breast, moderate volume volume. Well healed incisions from augmentation  Right: healing post biopsy hematomas to lateral breast.  No visible abnormalities on inspection while seated, with arms raised or hands on hips. No masses, skin changes, or nipple abnormalities.  Left:  No visible abnormalities on inspection while seated, with arms raised or hands on hips. No masses, skin  changes, or nipple abnormalities.    Right breast, in-office ultrasound: 2 lesions in right breast and associated biopsy clips visualized along implant capsule, lateral breast       Previous IMAGING: I have independently reviewed her imagin2024, Screening MMG with Froilan ([facility]):  There are scattered areas of fibroglandular density.  There are bilateral pre-pectoral saline implants.  1. There are areas of calcification noted in both implants. There has been no significant change from the prior exam(s).  2. There are a few punctate calcifications with grouped distribution seen in the anterior one third region of the left breast at 3 o'clock.  BI-RADS 0: Incomplete     2024, Left breast Digital Diagnostic MMG with Froilan ([facility]):  Additional evaluation was performed for the calcification in the left breast, 3 o'clock seen on 2024. On the present examination, there are amorphous calcifications with grouped distribution in the anterior one third region of the left breast at 3 o'clock.  BI-RADS 4B: Suspicious     04/15/2024, Left Breast, Stereotactic Biopsy ([facility]):  1.  Breast, left, 3:00, for calcifications: Breast tissue with               -A.  Incidental atypical lobular hyperplasia               -B.  Clustered microcysts with calcifications               -C.  Columnar cell change               -D.  Cholesterol clefting, hemosiderin and fat necrosis               -E.  Clustered apocrine cysts               -F.   Florid ductal hyperplasia of the usual type               -G.  Fibroadenomatoid hyperplasia  -Tophat clip. Concordant.      6/10/2024, [facility]  There is scattered fibroglandular tissue. There is mild background parenchymal enhancement. There are bilateral prepectoral  saline breast implants.  RIGHT BREAST:    There is mild asymmetric upper inner right breast skin thickening and edema. At 9:00 in the middle right breast, 8.5 cm posterior to the nipple, there is a 1.2  x 0.9 x 1.2 cm enhancing mass. There is a possible correlate in the outer right breast on the XCCL view from 2/28/2024, which has been marked. At 8:00 in the posterior right breast, 9 cm posterior to the nipple, there is a similar-appearing 1.0 x 0.7 x 0.8 cm enhancing mass, which is in close proximity with the underlying implant capsule. These are suspicious. The visualized axilla is within normal limits.  LEFT BREAST:    At 3:00 in the left breast, 3.4 cm posterior to the nipple, there is a focus of susceptibility from a biopsy clip marking the site of recent biopsy-proven atypical lobular hyperplasia. No suspicious enhancing mass or area of non-mass enhancement is identified at the biopsy site or elsewhere within the left breast. The visualized axilla is within normal limits.  EXTRAMAMMARY FINDINGS:  There are no pathologically enlarged internal mammary chain lymph nodes on either side.    BI-RADS 4: Suspicious     7/9/2024, Right Diagnostic MMG with Froilan & Right Breast Limited US ([facility]):  MMG:  The more anteriorly positioned 1.2 cm mass in the mid to posterior depth outer right breast is definitively seen only on the implant displaced extended CC view. The masses are otherwise likely obscured by the breast implant on the views without implant displacement. Partially imaged breast implant. No concerning microcalcification or architectural distortion.  US:  Multiplanar grayscale and color-flow imaging of the right breast was performed. Correlating with the MRI abnormality at the 9 o'clock position of the right breast 8 cm from the nipple, there is a well-circumscribed ovoid wider than tall heterogeneous probably hypoechoic mass measuring 1.2 cm x 0.9 cm x 0.5 cm superficial to the partially imaged breast implant. At the 9:30 position approximately 9 cm from the nipple, there is an additional hypoechoic mass measuring 0.6 cm x 0.6 cm x 0.5 cm, also superficial to the breast implant. No significant  shadowing.  IMPRESSION:  The 2 small masses seen on the prior MRI study in the lateral right breast superficial to the breast implant are visualized on ultrasound.  Recommend ultrasound-guided right breast biopsy.  BI-RADS 4. Suspicious.    PATHOLOGY:   7/25/24 - US guided Biopsies  1. Right breast, 10:00, 9 cm from nipple, ultrasound-guided core needle biopsy for a mass (HydroMARK clip):               A. INVASIVE DUCTAL CARCINOMA, moderately differentiated; [place] histologic grade II/III       (tubule score = 3, nuclear score = 2, mitoses score = 1), measuring at least 7 mm.               B. Negative for in situ carcinoma in this sample.               C. Negative for lymphovascular space invasion.               D. See biomarker template #1 and comment.     Estrogen Receptor (ER) Status  Positive (greater than 10% of cells demonstrate nuclear positivity)   Percentage of Cells with Nuclear Positivity  %   Average Intensity of Staining  Strong   Test Type  Food and Drug Administration (FDA) cleared (test / vendor): [place]   Primary Antibody  SP1   Scoring System  Katey   Proportion Score  5   Intensity Score  3   Total Katey Score  8   Test(s) Performed     Progesterone Receptor (PgR) Status  Positive   Percentage of Cells with Nuclear Positivity  %   Average Intensity of Staining  Strong   Test Type  Food and Drug Administration (FDA) cleared (test / vendor): [place]   Primary Antibody  1E2   Scoring System  Katey   Proportion Score  5   Intensity Score  3   Total Katey Score  8   Test(s) Performed     HER2 by Immunohistochemistry  Negative (Score 1+)       2. Right breast, 930, 8 cm from nipple, ultrasound-guided core needle biopsy for a mass (HydroMARK clip):               A. INVASIVE DUCTAL CARCINOMA, moderately differentiated; [place] histologic grade II/III       (tubule score = 3, nuclear score = 2, mitoses score = 1), measuring at least 8 mm.               B. Rare focus of intermediate  grade ductal carcinoma in situ (DCIS), cribriform type.               C. Negative for lymphovascular space invasion.               D. See biomarker template #2 and comment.  Estrogen Receptor (ER) Status  Positive (greater than 10% of cells demonstrate nuclear positivity)   Percentage of Cells with Nuclear Positivity  %   Average Intensity of Staining  Strong   Test Type  Food and Drug Administration (FDA) cleared (test / vendor): [facility]   Primary Antibody  SP1   Scoring System  Katey   Proportion Score  5   Intensity Score  3   Total Katey Score  8   Test(s) Performed     Progesterone Receptor (PgR) Status  Positive   Percentage of Cells with Nuclear Positivity  41-50%   Average Intensity of Staining  Strong   Test Type  Food and Drug Administration (FDA) cleared (test / vendor): [facility]   Primary Antibody  1E2   Scoring System  Katey   Proportion Score  4   Intensity Score  3   Total Katey Score  7   Test(s) Performed     HER2 by Immunohistochemistry  Negative (Score 1+)       Assessment & Plan   Assessment:  74 y.o. F with   right breast: Base of ductal carcinoma grade 2, ER/WI +, Her2 -; [id number], Clinical anatomic stage IA, Clinical prognostic stage IA.    Left breast ALH, incidental to calcifications, no MRI abnormal enhancement.    Discussion:  I had an extensive discussion with the patient and family about the nature of this breast cancer diagnosis. We reviewed the components of breast tissue including ducts and lobules. We reviewed her pathology report in detail. We reviewed breast cancer histology, including stage, grade, ER/WI receptors, HER2 receptors and how this applies to her diagnosis. We discussed the multidisciplinary approach to breast cancer care.  Treatments can include surgery, radiation therapy, and medical therapy (chemotherapy, targeted therapy, and/or endocrine therapy).  The exact type of treatment and the order of therapy depends on the size and location/distribution of breast  disease, the involvement of the regional lymph node basins, concern for or presence of metastatic disease, potential genetic mutations, and the individual breast cancer tumor markers expressed by the cancer. We also discussed other treatment options including the option of not undergoing any surgical treatment, with a palliative rather than curative intent and the risks associated with this including disease progression.    The patient's clinical stage is documented as above. This was discussed with the patient prior to initiation of treatment. All available pathology reports were discussed with the patient today. All treatment decisions were made via shared decision making with the patient. This patient was evaluated for appropriate ancillary referrals including, pre-treatment functional assessment, exercise program, nutrition program, genetics, and lymphedema clinic. This patient received preoperative and postoperative education. The patient was offered a breast reconstruction referral appropriate to plan surgical intervention; risks and benefits were discussed today. The patient was educated on perioperative multimodal pain management strategies. Barriers to effective and efficient care are/will be evaluated by the nurse navigator.    We discussed these options and recommendations for treatment:    Surgical Options:  We discussed the surgical management of breast cancer.  Partial mastectomy with radiation and mastectomy were explained with option of reconstruction.  The patient was informed that from a survival standpoint, both procedures are oncologically equivalent. She is a good candidate for lumpectomy and she would like to proceed with breast conservation. We discussed that lumpectomy would require preoperative localization with a wire or a NACHO. We also discussed the risk of positive margins and that she must have negative margins for lumpectomy to be an appropriate oncologic procedure. I will make every  effort to obtain negative margins at her initial operation, but there is a 10-15% chance that she will require a second operation for re-excision, or possibly a total mastectomy. We will not know the margin status until after her final pathology has returned.     We discussed that for the axilla, sentinel lymph node biopsy is recommended.  The procedure was explained in detail with the patient, including the preoperative injection of a radiotracer and intraoperative injection of lymphazurin blue dye. I explained that this is a mapping test and not a cancer test, that all of the lymph nodes containing these dyes will be removed for complete testing by pathology, and that the results could impact the decision for adjuvant treatment or additional surgery. All their questions were answered.      Medical Oncology:  We discussed that in her case, systemic treatment will likely involve endocrine therapy as targeted therapy.     We discussed that in her case, systemic treatment would involve endocrine therapy and possibly chemotherapy. She will be referred to medical oncology postoperatively to discuss this further. She is an oncotype candidate to determine if chemotherapy following surgery would help improve her overall survival and reduce the possibility of distant metastasis OR if there would be very little benefit from these therapies.       Radiation Oncology:  For all patients undergoing partial mastectomy for invasive breast cancer recommend consideration for whole breast radiation.  In some patients pending their final surgical pathology (size of tumor and histologic subtypes) may be able to omit adjuvant radiation.  Will plan to refer following surgery for discussion with radiation oncology.    Role for Genetic Testing:  I explained that most breast cancer is not hereditary, that I do not believe this plays a role in her case, and that she does not meet NCCN criteria for genetic testing. I would not recommend a  bilateral mastectomy, since her risk of a second primary cancer is relatively low and endocrine therapy will further reduce her risk.          Plan:  A multidisciplinary plan has been formulated for the patient:      # Breast Surgical Oncology:  -Right breast NACHO bracketed partial mastectomy, sentinel lymph node biopsy  -Consents completed and signed. Discussed the risks and benefits of right breast partial mastectomy, NACHO bracketed and sentinel lymph node biopsy at length including estimated time for procedure, postoperative recovery, and postoperative instructions. Discussed the risks to include pain, bleeding, infection, nerve injury, numbness, seroma, skin complications including necrosis, breast asymmetry, need for re-excision, lymphedema, inability to breast feed in the future, possible need for axillary dissection at time of surgery or at a later date, lymphocele, and scar.  Patient appears to understand and wishes to proceed.  All questions were answered  -Plastic surgery referral.  He has a personal history of bilateral saline prepack implants.  For the right breast invasion of that implant Capsule will be required for complete resection of the 2 breast masses.  - pending plastic surgery plans on the left - may consider excision of ALH if any tissue rearrangement planned.       # Medical Oncology:  -Is Oncotype candidate and will require 5 years of hormonal blockade  -Will refer postoperatively.    #  Radiation Oncology:  -Will refer postoperatively.    # Nurse Navigation  - Referral made today    # Lymphedema clinic  - Referral was placed today     # Genetic Testing   -Not indicated    # Breast Imaging  - Next Screening mammogram due: February 2025.  MRI to be completed July 2025      Marilia Mosley MD  Breast Surgical Oncology    I spent 60 minutes caring for Verito  on this date of service. This time includes time spent by me in the following activities: preparing for the visit, reviewing tests,  obtaining and/or reviewing a separately obtained history, performing a medically appropriate examination and/or evaluation , counseling and educating the patient/family/caregiver, ordering medications, tests, or procedures, referring and communicating with other health care professionals , documenting information in the medical record, independently interpreting results and communicating that information with the patient/family/caregiver, and care coordination.      CC:  Christa Oswald, APRN

## 2024-07-31 ENCOUNTER — OFFICE VISIT (OUTPATIENT)
Dept: SURGERY | Facility: CLINIC | Age: 74
End: 2024-07-31
Payer: MEDICARE

## 2024-07-31 ENCOUNTER — HOSPITAL ENCOUNTER (OUTPATIENT)
Dept: SURGERY | Facility: HOSPITAL | Age: 74
Discharge: HOME | End: 2024-07-31
Payer: MEDICARE

## 2024-07-31 VITALS
BODY MASS INDEX: 32.57 KG/M2 | SYSTOLIC BLOOD PRESSURE: 138 MMHG | RESPIRATION RATE: 18 BRPM | DIASTOLIC BLOOD PRESSURE: 82 MMHG | HEIGHT: 62 IN | HEART RATE: 86 BPM | OXYGEN SATURATION: 97 % | WEIGHT: 177 LBS

## 2024-07-31 DIAGNOSIS — C50.411 MALIGNANT NEOPLASM OF UPPER-OUTER QUADRANT OF RIGHT BREAST IN FEMALE, ESTROGEN RECEPTOR POSITIVE: Primary | ICD-10-CM

## 2024-07-31 DIAGNOSIS — Z17.0 MALIGNANT NEOPLASM OF UPPER-OUTER QUADRANT OF RIGHT BREAST IN FEMALE, ESTROGEN RECEPTOR POSITIVE: ICD-10-CM

## 2024-07-31 DIAGNOSIS — C50.411 MALIGNANT NEOPLASM OF UPPER-OUTER QUADRANT OF RIGHT BREAST IN FEMALE, ESTROGEN RECEPTOR POSITIVE: ICD-10-CM

## 2024-07-31 DIAGNOSIS — N60.92 ATYPICAL LOBULAR HYPERPLASIA (ALH) OF LEFT BREAST: ICD-10-CM

## 2024-07-31 DIAGNOSIS — Z17.0 MALIGNANT NEOPLASM OF UPPER-OUTER QUADRANT OF RIGHT BREAST IN FEMALE, ESTROGEN RECEPTOR POSITIVE: Primary | ICD-10-CM

## 2024-07-31 NOTE — LETTER
August 1, 2024     MICHELLE Campo  [address] [facility] [address]    Patient: Verito العراقي   YOB: 1950   Date of Visit: 7/31/2024     Dear MICHELLE Campo:       Thank you for referring Verito العراقي to me for evaluation. Below are the relevant portions of my assessment and plan of care.    If you have questions, please do not hesitate to call me. I look forward to following Verito along with you.         Sincerely,        Marilia Mosley MD        CC: MD Dimitri Dowling Sarah, MD  08/01/24 0380  Sign when Signing Visit  [facility]     Referring Provider: Darlene Garrett MD     Chief complaint: newly diagnosed breast cancer    Subjective    HPI: Ms. Verito العراقي is a 74 y.o. yo woman, seen at the request of Dr. Garrett for a new diagnosis of right breast cancer.  She has been followed by Dr. Garrett for atypical lobular hyperplasia diagnosed earlier this year.  On MRI for high risk she was found to have 2 concerning masses within her right breast.    She underwent bilateral breast augmentation most recently with prepectoral saline implants placed.  Her last surgery she estimates was approximately 20 years ago.    She reports she has a pertinent PMH of hyperlipidemia, anxiety, history of diastolic dysfunction and mitral valve prolapse.     She was joined today in clinic by her . They all participated in the discussion, after her examination, and she gave her consent for them to participate.       Oncology/Hematology History   Malignant neoplasm of upper-outer quadrant of right breast in female, estrogen receptor positive   7/25/2024 Cancer Staged    Staging form: Breast, AJCC 8th Edition  - Clinical stage from 7/25/2024: Stage IA (cT1c, cN0, cM0, G2, ER+, IL+, HER2-) - Signed by Marilia Mosley MD on 8/1/2024 7/30/2024 Initial Diagnosis    Malignant neoplasm of upper-outer quadrant of right breast in female, estrogen receptor positive          Review of Systems   Constitutional:  Negative for appetite change, fatigue and fever.   HENT:  Negative.     Eyes: Negative.    Respiratory:  Negative for cough and shortness of breath.    Cardiovascular: Negative.    Gastrointestinal:  Negative for abdominal distention, diarrhea and nausea.   Endocrine: Negative.    Genitourinary: Negative.     Musculoskeletal:  Negative for arthralgias and back pain.   Skin: Negative.    Neurological:  Negative for dizziness, headaches and speech difficulty.   Hematological: Negative.    Psychiatric/Behavioral:  Negative for decreased concentration and sleep disturbance. The patient is nervous/anxious.        Medications:    Current Outpatient Medications:   •  atorvastatin (LIPITOR) 20 MG tablet, TAKE 1 TABLET DAILY, Disp: 90 tablet, Rfl: 3  •  busPIRone (BUSPAR) 5 MG tablet, TAKE 1 TABLET THREE TIMES A DAY, Disp: 270 tablet, Rfl: 3  •  calcium carbonate-vitamin d 600-400 MG-UNIT per tablet, Take 1 tablet by mouth Daily As Needed., Disp: , Rfl:   •  Cetirizine HCl (ZYRTEC PO), Take 1 tablet by mouth daily., Disp: , Rfl:   •  fluticasone (FLONASE) 50 MCG/ACT nasal spray, 2 sprays by Each Nare route Daily., Disp: 16 mL, Rfl: 11  •  glucosamine-chondroitin 500-400 MG capsule capsule, Take 2 capsules by mouth Daily., Disp: , Rfl:   •  Multiple Vitamins tablet, Take by mouth., Disp: , Rfl:   •  vitamin C (ASCORBIC ACID) 500 MG tablet, Take 1 tablet by mouth Daily., Disp: , Rfl:   •  vitamin E 400 UNIT capsule, Take 1 capsule by mouth Daily., Disp: , Rfl:     Allergies:  Allergies   Allergen Reactions   • Ciprofloxacin        Medical history:  Past Medical History:   Diagnosis Date   • Acute conjunctivitis    • Allergic rhinitis    • Colon polyp 10/31/2017    , ascending colon tubular adenoma   • Hemorrhoids    • History of bone density study 08/2011    nl   • History of bone density study     nl 06/05/2017   • History of mitral valve prolapse     she was told this in the  psat, but she had a normal echo in 2016   • Hyperplastic colon polyp      hyperplastic rectal and colon polyps,    • Pregnancy            Surgical History:  Past Surgical History:   Procedure Laterality Date   • COLONOSCOPY W/ POLYPECTOMY  10/31/2017    , ascending colon 3 mm 10/31/2017, sigmoid colon (3mm) - hyperplastic polyp  2012       Family History:  Family History   Problem Relation Age of Onset   • Macular degeneration Mother    • Hypertension Mother    • Dementia Mother    • Diabetes Father    • Heart disease Father    • Prostate cancer Father    • Heart disease Maternal Grandmother    • Heart disease Paternal Grandfather        Family Cancer History: relationship - (age of diagnosis/current age or age at death)  Breast: N/A   Ovarian: N/A  Colon: N/A  Pancreas: N/A  Prostate: N/A    Social History:   Social History     Socioeconomic History   • Marital status:    • Number of children: 3   Tobacco Use   • Smoking status: Never   • Smokeless tobacco: Never   Vaping Use   • Vaping status: Never Used   Substance and Sexual Activity   • Alcohol use: Not Currently     Alcohol/week: 7.0 standard drinks of alcohol     Types: 7 Glasses of wine per week     Comment: social   • Drug use: No   • Sexual activity: Defer       She lives with her .  She is a retired teacher.      GYNECOLOGIC HISTORY:   G: 3. P: 3. AB: 0.  Last menstrual period: in her 40's  Age at menarche: 12  Age at first childbirth: 20  Lactation/How lon month  Age at menopause: in her 40's  Total years of oral contraceptive use: 2-3 years  Total years of hormone replacement therapy: 0      Objective   PHYSICAL EXAMINATION:   Vitals:    24 1257   BP: 138/82   Pulse: 86   Resp: 18   SpO2: 97%     Examination chaperoned by Valerie.  ECOG 0 - Asymptomatic  General: NAD, well appearing  Psych: a&o x 3, normal mood and affect  Eyes: EOMI, no scleral icterus  ENMT: neck supple without masses or thyromegaly,  mucus membranes moist  Resp: normal effort  CV: RRR, no edema   GI: soft, NT, ND  MSK: normal gait, normal ROM in bilateral shoulders  Lymph nodes:  no cervical, supraclavicular or axillary lymphadenopathy  Breast: symmetric, pendulous breast, moderate volume volume. Well healed incisions from augmentation  Right: healing post biopsy hematomas to lateral breast.  No visible abnormalities on inspection while seated, with arms raised or hands on hips. No masses, skin changes, or nipple abnormalities.  Left:  No visible abnormalities on inspection while seated, with arms raised or hands on hips. No masses, skin changes, or nipple abnormalities.    Right breast, in-office ultrasound: 2 lesions in right breast and associated biopsy clips visualized along implant capsule, lateral breast       Previous IMAGING: I have independently reviewed her imagin2024, Screening MMG with Froilan ([facility]):  There are scattered areas of fibroglandular density.  There are bilateral pre-pectoral saline implants.  1. There are areas of calcification noted in both implants. There has been no significant change from the prior exam(s).  2. There are a few punctate calcifications with grouped distribution seen in the anterior one third region of the left breast at 3 o'clock.  BI-RADS 0: Incomplete     2024, Left breast Digital Diagnostic MMG with Froilan ([facility]):  Additional evaluation was performed for the calcification in the left breast, 3 o'clock seen on 2024. On the present examination, there are amorphous calcifications with grouped distribution in the anterior one third region of the left breast at 3 o'clock.  BI-RADS 4B: Suspicious     04/15/2024, Left Breast, Stereotactic Biopsy ([facility]):  1.  Breast, left, 3:00, for calcifications: Breast tissue with               -A.  Incidental atypical lobular hyperplasia               -B.  Clustered microcysts with calcifications               -C.  Columnar cell change               -D.   Cholesterol clefting, hemosiderin and fat necrosis               -E.  Clustered apocrine cysts               -F.   Florid ductal hyperplasia of the usual type               -G.  Fibroadenomatoid hyperplasia  -Tophat clip. Concordant.      [facility]2024[facility]  There is scattered fibroglandular tissue. There is mild background parenchymal enhancement. There are bilateral prepectoral  saline breast implants.  RIGHT BREAST:    There is mild asymmetric upper inner right breast skin thickening and edema. At 9:00 in the middle right breast, 8.5 cm posterior to the nipple, there is a 1.2 x 0.9 x 1.2 cm enhancing mass. There is a possible correlate in the outer right breast on the XCCL view from 2/28/2024, which has been marked. At 8:00 in the posterior right breast, 9 cm posterior to the nipple, there is a similar-appearing 1.0 x 0.7 x 0.8 cm enhancing mass, which is in close proximity with the underlying implant capsule. These are suspicious. The visualized axilla is within normal limits.  LEFT BREAST:    At 3:00 in the left breast, 3.4 cm posterior to the nipple, there is a focus of susceptibility from a biopsy clip marking the site of recent biopsy-proven atypical lobular hyperplasia. No suspicious enhancing mass or area of non-mass enhancement is identified at the biopsy site or elsewhere within the left breast. The visualized axilla is within normal limits.  EXTRAMAMMARY FINDINGS:  There are no pathologically enlarged internal mammary chain lymph nodes on either side.    BI-RADS 4: Suspicious     7/9/2024, Right Diagnostic MMG with Froilan & Right Breast Limited US ( [facility]):  MMG:  The more anteriorly positioned 1.2 cm mass in the mid to posterior depth outer right breast is definitively seen only on the implant displaced extended CC view. The masses are otherwise likely obscured by the breast implant on the views without implant displacement. Partially imaged breast implant. No concerning  microcalcification or architectural distortion.  US:  Multiplanar grayscale and color-flow imaging of the right breast was performed. Correlating with the MRI abnormality at the 9 o'clock position of the right breast 8 cm from the nipple, there is a well-circumscribed ovoid wider than tall heterogeneous probably hypoechoic mass measuring 1.2 cm x 0.9 cm x 0.5 cm superficial to the partially imaged breast implant. At the 9:30 position approximately 9 cm from the nipple, there is an additional hypoechoic mass measuring 0.6 cm x 0.6 cm x 0.5 cm, also superficial to the breast implant. No significant shadowing.  IMPRESSION:  The 2 small masses seen on the prior MRI study in the lateral right breast superficial to the breast implant are visualized on ultrasound.  Recommend ultrasound-guided right breast biopsy.  BI-RADS 4. Suspicious.    PATHOLOGY:   7/25/24 - US guided Biopsies  1. Right breast, 10:00, 9 cm from nipple, ultrasound-guided core needle biopsy for a mass (HydroMARK clip):               A. INVASIVE DUCTAL CARCINOMA, moderately differentiated; [place] histologic grade II/III       (tubule score = 3, nuclear score = 2, mitoses score = 1), measuring at least 7 mm.               B. Negative for in situ carcinoma in this sample.               C. Negative for lymphovascular space invasion.               D. See biomarker template #1 and comment.     Estrogen Receptor (ER) Status  Positive (greater than 10% of cells demonstrate nuclear positivity)   Percentage of Cells with Nuclear Positivity  %   Average Intensity of Staining  Strong   Test Type  Food and Drug Administration (FDA) cleared (test / vendor): [place]   Primary Antibody  SP1   Scoring System  Katey   Proportion Score  5   Intensity Score  3   Total Katey Score  8   Test(s) Performed     Progesterone Receptor (PgR) Status  Positive   Percentage of Cells with Nuclear Positivity  %   Average Intensity of Staining  Strong   Test Type  Food  and Drug Administration (FDA) cleared (test / vendor): [place]   Primary Antibody  1E2   Scoring System  Katey   Proportion Score  5   Intensity Score  3   Total Katey Score  8   Test(s) Performed     HER2 by Immunohistochemistry  Negative (Score 1+)       2. Right breast, 930, 8 cm from nipple, ultrasound-guided core needle biopsy for a mass (HydroMARK clip):               A. INVASIVE DUCTAL CARCINOMA, moderately differentiated; [place] histologic grade II/III       (tubule score = 3, nuclear score = 2, mitoses score = 1), measuring at least 8 mm.               B. Rare focus of intermediate grade ductal carcinoma in situ (DCIS), cribriform type.               C. Negative for lymphovascular space invasion.               D. See biomarker template #2 and comment.  Estrogen Receptor (ER) Status  Positive (greater than 10% of cells demonstrate nuclear positivity)   Percentage of Cells with Nuclear Positivity  %   Average Intensity of Staining  Strong   Test Type  Food and Drug Administration (FDA) cleared (test / vendor): [place]   Primary Antibody  SP1   Scoring System  Katey   Proportion Score  5   Intensity Score  3   Total Katey Score  8   Test(s) Performed     Progesterone Receptor (PgR) Status  Positive   Percentage of Cells with Nuclear Positivity  41-50%   Average Intensity of Staining  Strong   Test Type  Food and Drug Administration (FDA) cleared (test / vendor): [place]   Primary Antibody  1E2   Scoring System  Katey   Proportion Score  4   Intensity Score  3   Total Katey Score  7   Test(s) Performed     HER2 by Immunohistochemistry  Negative (Score 1+)       Assessment & Plan   Assessment:  74 y.o. F with   right breast: Base of ductal carcinoma grade 2, ER/VA +, Her2 -; [id number], Clinical anatomic stage IA, Clinical prognostic stage IA.    Left breast ALH, incidental to calcifications, no MRI abnormal enhancement.    Discussion:  I had an extensive discussion with the patient and family  about the nature of this breast cancer diagnosis. We reviewed the components of breast tissue including ducts and lobules. We reviewed her pathology report in detail. We reviewed breast cancer histology, including stage, grade, ER/CT receptors, HER2 receptors and how this applies to her diagnosis. We discussed the multidisciplinary approach to breast cancer care.  Treatments can include surgery, radiation therapy, and medical therapy (chemotherapy, targeted therapy, and/or endocrine therapy).  The exact type of treatment and the order of therapy depends on the size and location/distribution of breast disease, the involvement of the regional lymph node basins, concern for or presence of metastatic disease, potential genetic mutations, and the individual breast cancer tumor markers expressed by the cancer. We also discussed other treatment options including the option of not undergoing any surgical treatment, with a palliative rather than curative intent and the risks associated with this including disease progression.    The patient's clinical stage is documented as above. This was discussed with the patient prior to initiation of treatment. All available pathology reports were discussed with the patient today. All treatment decisions were made via shared decision making with the patient. This patient was evaluated for appropriate ancillary referrals including, pre-treatment functional assessment, exercise program, nutrition program, genetics, and lymphedema clinic. This patient received preoperative and postoperative education. The patient was offered a breast reconstruction referral appropriate to plan surgical intervention; risks and benefits were discussed today. The patient was educated on perioperative multimodal pain management strategies. Barriers to effective and efficient care are/will be evaluated by the nurse navigator.    We discussed these options and recommendations for treatment:    Surgical  Options:  We discussed the surgical management of breast cancer.  Partial mastectomy with radiation and mastectomy were explained with option of reconstruction.  The patient was informed that from a survival standpoint, both procedures are oncologically equivalent. She is a good candidate for lumpectomy and she would like to proceed with breast conservation. We discussed that lumpectomy would require preoperative localization with a wire or a NACHO. We also discussed the risk of positive margins and that she must have negative margins for lumpectomy to be an appropriate oncologic procedure. I will make every effort to obtain negative margins at her initial operation, but there is a 10-15% chance that she will require a second operation for re-excision, or possibly a total mastectomy. We will not know the margin status until after her final pathology has returned.     We discussed that for the axilla, sentinel lymph node biopsy is recommended.  The procedure was explained in detail with the patient, including the preoperative injection of a radiotracer and intraoperative injection of lymphazurin blue dye. I explained that this is a mapping test and not a cancer test, that all of the lymph nodes containing these dyes will be removed for complete testing by pathology, and that the results could impact the decision for adjuvant treatment or additional surgery. All their questions were answered.      Medical Oncology:  We discussed that in her case, systemic treatment will likely involve endocrine therapy as targeted therapy.     We discussed that in her case, systemic treatment would involve endocrine therapy and possibly chemotherapy. She will be referred to medical oncology postoperatively to discuss this further. She is an oncotype candidate to determine if chemotherapy following surgery would help improve her overall survival and reduce the possibility of distant metastasis OR if there would be very little benefit  from these therapies.       Radiation Oncology:  For all patients undergoing partial mastectomy for invasive breast cancer recommend consideration for whole breast radiation.  In some patients pending their final surgical pathology (size of tumor and histologic subtypes) may be able to omit adjuvant radiation.  Will plan to refer following surgery for discussion with radiation oncology.    Role for Genetic Testing:  I explained that most breast cancer is not hereditary, that I do not believe this plays a role in her case, and that she does not meet NCCN criteria for genetic testing. I would not recommend a bilateral mastectomy, since her risk of a second primary cancer is relatively low and endocrine therapy will further reduce her risk.          Plan:  A multidisciplinary plan has been formulated for the patient:      # Breast Surgical Oncology:  -Right breast NACHO bracketed partial mastectomy, sentinel lymph node biopsy  -Consents completed and signed. Discussed the risks and benefits of right breast partial mastectomy, NACHO bracketed and sentinel lymph node biopsy at length including estimated time for procedure, postoperative recovery, and postoperative instructions. Discussed the risks to include pain, bleeding, infection, nerve injury, numbness, seroma, skin complications including necrosis, breast asymmetry, need for re-excision, lymphedema, inability to breast feed in the future, possible need for axillary dissection at time of surgery or at a later date, lymphocele, and scar.  Patient appears to understand and wishes to proceed.  All questions were answered  -Plastic surgery referral.  He has a personal history of bilateral saline prepack implants.  For the right breast invasion of that implant Capsule will be required for complete resection of the 2 breast masses.  - pending plastic surgery plans on the left - may consider excision of ALH if any tissue rearrangement planned.       # Medical Oncology:  -Is  Oncotype candidate and will require 5 years of hormonal blockade  -Will refer postoperatively.    #  Radiation Oncology:  -Will refer postoperatively.    # Nurse Navigation  - Referral made today    # Lymphedema clinic  - Referral was placed today     # Genetic Testing   -Not indicated    # Breast Imaging  - Next Screening mammogram due: February 2025.  MRI to be completed July 2025      Marilia Mosley MD  Breast Surgical Oncology    I spent 60 minutes caring for Verito  on this date of service. This time includes time spent by me in the following activities: preparing for the visit, reviewing tests, obtaining and/or reviewing a separately obtained history, performing a medically appropriate examination and/or evaluation , counseling and educating the patient/family/caregiver, ordering medications, tests, or procedures, referring and communicating with other health care professionals , documenting information in the medical record, independently interpreting results and communicating that information with the patient/family/caregiver, and care coordination.      CC:  Christa Oswald, APRN

## 2024-08-15 ENCOUNTER — TELEPHONE (OUTPATIENT)
Dept: SURGERY | Facility: CLINIC | Age: 74
End: 2024-08-15
Payer: MEDICARE

## 2024-08-15 ENCOUNTER — PREP FOR SURGERY (OUTPATIENT)
Dept: OTHER | Facility: HOSPITAL | Age: 74
End: 2024-08-15
Payer: MEDICARE

## 2024-08-15 DIAGNOSIS — C50.411 MALIGNANT NEOPLASM OF UPPER-OUTER QUADRANT OF RIGHT BREAST IN FEMALE, ESTROGEN RECEPTOR POSITIVE: Primary | ICD-10-CM

## 2024-08-15 DIAGNOSIS — Z17.0 MALIGNANT NEOPLASM OF UPPER-OUTER QUADRANT OF RIGHT BREAST IN FEMALE, ESTROGEN RECEPTOR POSITIVE: Primary | ICD-10-CM

## 2024-08-15 RX ORDER — DIAZEPAM 5 MG
10 TABLET ORAL ONCE
Status: CANCELLED | OUTPATIENT
Start: 2024-09-12 | End: 2024-08-15

## 2024-08-15 NOTE — TELEPHONE ENCOUNTER
PT, Analilia, and I have discussed a surgery date of 09/12/2024. PT is aware and agrees. I will reach out with details at a later time.    MEN

## 2024-08-15 NOTE — TELEPHONE ENCOUNTER
All patient and her  back regarding upcoming trip planned in November to [place].  Effectively they will be gone majority of the month leaving early and returning Monday the 25th.    Discussed again the recommendation for therapy algorithm following breast conservation most cases require adjuvant radiation.  Length of therapy can last anywhere from 3 to 6 weeks.  Their trip would likely fall within this window of recommended radiation therapy.  However final recommendations should be made by Dr. Alvarado.  Will place referral to radiation oncology for preoperative discussion of radiation oncology therapy    Marilia Mosley MD

## 2024-08-27 ENCOUNTER — TELEPHONE (OUTPATIENT)
Dept: RADIATION ONCOLOGY | Facility: HOSPITAL | Age: 74
End: 2024-08-27
Payer: MEDICARE

## 2024-08-28 ENCOUNTER — CONSULT (OUTPATIENT)
Dept: RADIATION ONCOLOGY | Facility: HOSPITAL | Age: 74
End: 2024-08-28
Payer: MEDICARE

## 2024-08-28 DIAGNOSIS — C50.411 MALIGNANT NEOPLASM OF UPPER-OUTER QUADRANT OF RIGHT BREAST IN FEMALE, ESTROGEN RECEPTOR POSITIVE: Primary | ICD-10-CM

## 2024-08-28 DIAGNOSIS — Z17.0 MALIGNANT NEOPLASM OF UPPER-OUTER QUADRANT OF RIGHT BREAST IN FEMALE, ESTROGEN RECEPTOR POSITIVE: Primary | ICD-10-CM

## 2024-08-28 NOTE — PROGRESS NOTES
Cancer Staging   Stage IA (cT1c, cN0, cM0, G2, ER+, AR+, HER2-)    CC: right breast cancer, evaluate for radiation                              Dear Marilia Mosley MD    I had the pleasure of seeing Verito العراقي  [facility].   The patient is a 74 y.o. female with recently diagnosed right breast cancer.  She had a screening mammogram on 02/28/2024 which showed bilateral pre-pectoral saline implants, a few punctate calcifications with grouped distribution seen in the anterior one third region of the left breast at 3 o'clock.BI-RADS 0: Incomplete.  She had a left breast diagnostic mammogram on 03/20/2024 which showed amorphous calcifications with grouped distribution in the anterior one third region of the left breast at 3 o'clock.BI-RADS 4B: Suspicious.    She had a stereotactic biopsy of the left breast 3 oclock calcs on 4/15/24 which showed Incidental atypical lobular hyperplasia.    She had a breast mri on 6/10/24 which showed No suspicious enhancement is identified at 3:00 in the left breastat a site of biopsy-proven atypical lobular hyperplasia, which is markedwith a top hat clip. Consider surgical management.2.  Suspicious 1.2 cm enhancing mass at 9:00 with a possiblemammographic correlate and suspicious 1 cm enhancing mass at 8:00 in theright breast. Recommend further evaluation with diagnostic mammogram andtargeted ultrasound, followed by possible stereotactic and/or ultrasoundguided core needle biopsies.    She had a right breast diagnostic mammogram and ultrasound on 7/9/24 which showed the more anteriorly positioned 1.2 cm mass in the mid to posterior depth outer right breast is definitively seen only on the implant displaced extended CC view. The masses are otherwise likely obscured by the breast implant on the views without implant displacement. Partially imaged breast implant. No concerning microcalcification or architectural distortion. Ultrasound showed at the 9 o'clock  position of the right breast 8 cm from the nipple, there is a well-circumscribed ovoid wider than tall heterogeneous probably hypoechoic mass measuring 1.2 cm x 0.9 cm x 0.5 cm superficial to the partially imaged breast implant. At the 9:30 position approximately 9 cm from the nipple, there is an additional hypoechoic mass measuring 0.6 cm x 0.6 cm x 0.5 cm, also superficial to the breast implant. No significant shadowing.IMPRESSION: The 2 small masses seen on the prior MRI study in the lateral right breast superficial to the breast implant are visualized on ultrasound. Recommend ultrasound-guided right breast biopsy.BI-RADS 4. Suspicious.     She underwent a biopsy of the right breast 10 colock position on 7/25/24 which revealed grade ii invasive ductal carcinoma, 7mm no dcis, no lvsi.  Biopsy of right breast 9:30 position revealed invasive ductal carcinoma grade II, 8mm, rare focus of DCIS cribriform type, no lvsi ER % HI % Her  2negative ki67 15%.  The second tumor was ER % HI 41-50$ Her 2 neg ki67 40%.      She is  G: 3. P: 3. AB: 0 menarche age 12 and first childbirth age 20.  She breast fed for one months.  She went through menopause in her 40s.  She used oral contraceptives for a few years and has never used hormone replacement therapy.       Review of Systems   Constitutional: Negative.    HENT: Negative.     Respiratory: Negative.     Cardiovascular: Negative.    Gastrointestinal: Negative.    Genitourinary: Negative.    Musculoskeletal: Negative.    Neurological: Negative.    Psychiatric/Behavioral: Negative.           Past Medical History:   Diagnosis Date    Acute conjunctivitis     Allergic rhinitis     Colon polyp 10/31/2017    , ascending colon tubular adenoma    Hemorrhoids     History of bone density study 08/2011    nl    History of bone density study     nl 06/05/2017    History of mitral valve prolapse     she was told this in the psat, but she had a normal echo in 2016     Hyperplastic colon polyp      hyperplastic rectal and colon polyps,     Pregnancy              Past Surgical History:   Procedure Laterality Date    COLONOSCOPY W/ POLYPECTOMY  10/31/2017    , ascending colon 3 mm 10/31/2017, sigmoid colon (3mm) - hyperplastic polyp  2012         Social History     Socioeconomic History    Marital status:     Number of children: 3   Tobacco Use    Smoking status: Never    Smokeless tobacco: Never   Vaping Use    Vaping status: Never Used   Substance and Sexual Activity    Alcohol use: Not Currently     Alcohol/week: 7.0 standard drinks of alcohol     Types: 7 Glasses of wine per week     Comment: social    Drug use: No    Sexual activity: Defer         Family History   Problem Relation Age of Onset    Macular degeneration Mother     Hypertension Mother     Dementia Mother     Diabetes Father     Heart disease Father     Prostate cancer Father     Heart disease Maternal Grandmother     Heart disease Paternal Grandfather           Objective    Physical Exam  Constitutional:       Appearance: Normal appearance.   Eyes:      Extraocular Movements: Extraocular movements intact.   Chest:          Comments: No palpable masses in either breast or axilla  Neurological:      General: No focal deficit present.      Mental Status: She is alert.   Psychiatric:         Mood and Affect: Mood normal.         Behavior: Behavior normal.         Current Outpatient Medications on File Prior to Visit   Medication Sig Dispense Refill    atorvastatin (LIPITOR) 20 MG tablet TAKE 1 TABLET DAILY 90 tablet 3    busPIRone (BUSPAR) 5 MG tablet TAKE 1 TABLET THREE TIMES A  tablet 3    calcium carbonate-vitamin d 600-400 MG-UNIT per tablet Take 1 tablet by mouth Daily As Needed.      Cetirizine HCl (ZYRTEC PO) Take 1 tablet by mouth daily.      fluticasone (FLONASE) 50 MCG/ACT nasal spray 2 sprays by Each Nare route Daily. 16 mL 11    glucosamine-chondroitin 500-400 MG  capsule capsule Take 2 capsules by mouth Daily.      Multiple Vitamins tablet Take by mouth.      vitamin C (ASCORBIC ACID) 500 MG tablet Take 1 tablet by mouth Daily.      vitamin E 400 UNIT capsule Take 1 capsule by mouth Daily.       No current facility-administered medications on file prior to visit.       ALLERGIES:    Allergies   Allergen Reactions    Ciprofloxacin        There were no vitals taken for this visit.     (0) Fully active, able to carry on all predisease performance without restriction       No data to display                  Assessment & Plan     74 year old female with [id number] right breast cancer, ER MD Positive Her 2 negative.  She is scheduled for a lumpectomy on 9/12/24.  I discussed with her the role for post-operative radiation therapy to the right breast to decrease the risk of local recurrence.  We discussed the long-term results of the CALGB 9343 randomized clinical trial assessing the benefits of radiation therapy and women with stage I, ER+ breast cancer in addition to hormonal therapy.  At a 10 year time point 98% of women receiving hormonal therapy + radiation were free from local and regional recurrence compared to 90% of those receiving hormonal therapy alone.  There were no differences in time to mastectomy, time to distant metastases, breast cancer specific survival, or overall survival.     I discussed with her in detail the risks, benefits and rationale of radiation therapy to the right breast to include but not limited to the following:    Acute: skin erythema, breakdown/moist desquamation, swelling or discomfort of the breast, fatigue, pneumonitis resulting in shortness of breath, cough or pain, damage to the implant or infection requiring removal    Late: Permanent skin changes including hyperpigmentation, telangiectasias, fibrosis of the breast resulting in smaller size or poor cosmetic outcome, capsular contracture of implant requiring removal, late edema or cellulitis,  late rib fracture, late pulmonary fibrosis and the remote risk of second malignancies.      She voiced understanding and was given an opportunity to ask questions which I believe were answered to her satisfaction.  I explained that we would not be able to make final recommendations for adjuvant radiation until after her surgery.  She is scheduled for surgery on 9/12/24.  I have scheduled her to return for re-evaluation on October 1.  She has a trip planned to [place] in November.     I personally spent greater than 60 minutes today assessing, managing, discussing and documenting my visit with the patient. That time includes review of records, imaging and pathology reports, obtaining my own history, performing a medically appropriate evaluation, counseling and educating the patient, discussing goals, logistics, alternatives and risks of my recommendations, surveillance options and potential outcomes. It also includes the time documenting the clinical information in the EMR and communicating my recommendations to the other involved physicians.              Thank you very much for allowing me to participate in the care of this very pleasant patient.    Sincerely,      Gabriella Alvarado MD

## 2024-09-03 ENCOUNTER — TELEPHONE (OUTPATIENT)
Dept: SURGERY | Facility: CLINIC | Age: 74
End: 2024-09-03
Payer: MEDICARE

## 2024-09-03 NOTE — TELEPHONE ENCOUNTER
Pt confirmed the following appts:    PAT is scheduled on 9/4/2024 at 6:30  Annabelle is scheduled at [facility] on 9/4/2024 arrive at 2:15  Pre op with Dr. Sanchez is scheduled on 9/9/2024 at 1:30  Surgery is scheduled on 9/12/2024 arrival time is 5:15  Post op appt is scheduled on 9/25/2024 at 11:00    CMA

## 2024-09-04 ENCOUNTER — PRE-ADMISSION TESTING (OUTPATIENT)
Dept: PREADMISSION TESTING | Facility: HOSPITAL | Age: 74
End: 2024-09-04
Payer: MEDICARE

## 2024-09-04 VITALS
DIASTOLIC BLOOD PRESSURE: 89 MMHG | TEMPERATURE: 98.5 F | HEIGHT: 62 IN | HEART RATE: 78 BPM | OXYGEN SATURATION: 100 % | BODY MASS INDEX: 33.66 KG/M2 | RESPIRATION RATE: 16 BRPM | WEIGHT: 182.9 LBS | SYSTOLIC BLOOD PRESSURE: 156 MMHG

## 2024-09-04 DIAGNOSIS — Z17.0 MALIGNANT NEOPLASM OF UPPER-OUTER QUADRANT OF RIGHT BREAST IN FEMALE, ESTROGEN RECEPTOR POSITIVE: ICD-10-CM

## 2024-09-04 DIAGNOSIS — C50.411 MALIGNANT NEOPLASM OF UPPER-OUTER QUADRANT OF RIGHT BREAST IN FEMALE, ESTROGEN RECEPTOR POSITIVE: ICD-10-CM

## 2024-09-04 LAB
ANION GAP SERPL CALCULATED.3IONS-SCNC: 10.4 MMOL/L (ref 5–15)
BASOPHILS # BLD AUTO: 0.07 10*3/MM3 (ref 0–0.2)
BASOPHILS NFR BLD AUTO: 1 % (ref 0–1.5)
BUN SERPL-MCNC: 9 MG/DL (ref 8–23)
BUN/CREAT SERPL: 12.5 (ref 7–25)
CALCIUM SPEC-SCNC: 9.6 MG/DL (ref 8.6–10.5)
CHLORIDE SERPL-SCNC: 103 MMOL/L (ref 98–107)
CO2 SERPL-SCNC: 24.6 MMOL/L (ref 22–29)
CREAT SERPL-MCNC: 0.72 MG/DL (ref 0.57–1)
DEPRECATED RDW RBC AUTO: 38.4 FL (ref 37–54)
EGFRCR SERPLBLD CKD-EPI 2021: 87.9 ML/MIN/1.73
EOSINOPHIL # BLD AUTO: 0.24 10*3/MM3 (ref 0–0.4)
EOSINOPHIL NFR BLD AUTO: 3.5 % (ref 0.3–6.2)
ERYTHROCYTE [DISTWIDTH] IN BLOOD BY AUTOMATED COUNT: 11.9 % (ref 12.3–15.4)
GLUCOSE SERPL-MCNC: 107 MG/DL (ref 65–99)
HCT VFR BLD AUTO: 41.9 % (ref 34–46.6)
HGB BLD-MCNC: 14.1 G/DL (ref 12–15.9)
IMM GRANULOCYTES # BLD AUTO: 0.02 10*3/MM3 (ref 0–0.05)
IMM GRANULOCYTES NFR BLD AUTO: 0.3 % (ref 0–0.5)
LYMPHOCYTES # BLD AUTO: 2.33 10*3/MM3 (ref 0.7–3.1)
LYMPHOCYTES NFR BLD AUTO: 33.8 % (ref 19.6–45.3)
MCH RBC QN AUTO: 30.3 PG (ref 26.6–33)
MCHC RBC AUTO-ENTMCNC: 33.7 G/DL (ref 31.5–35.7)
MCV RBC AUTO: 89.9 FL (ref 79–97)
MONOCYTES # BLD AUTO: 0.61 10*3/MM3 (ref 0.1–0.9)
MONOCYTES NFR BLD AUTO: 8.8 % (ref 5–12)
NEUTROPHILS NFR BLD AUTO: 3.63 10*3/MM3 (ref 1.7–7)
NEUTROPHILS NFR BLD AUTO: 52.6 % (ref 42.7–76)
NRBC BLD AUTO-RTO: 0 /100 WBC (ref 0–0.2)
PLATELET # BLD AUTO: 264 10*3/MM3 (ref 140–450)
PMV BLD AUTO: 9.8 FL (ref 6–12)
POTASSIUM SERPL-SCNC: 4 MMOL/L (ref 3.5–5.2)
QT INTERVAL: 388 MS
QTC INTERVAL: 419 MS
RBC # BLD AUTO: 4.66 10*6/MM3 (ref 3.77–5.28)
SODIUM SERPL-SCNC: 138 MMOL/L (ref 136–145)
WBC NRBC COR # BLD AUTO: 6.9 10*3/MM3 (ref 3.4–10.8)

## 2024-09-04 PROCEDURE — 85025 COMPLETE CBC W/AUTO DIFF WBC: CPT | Performed by: STUDENT IN AN ORGANIZED HEALTH CARE EDUCATION/TRAINING PROGRAM

## 2024-09-04 PROCEDURE — 93005 ELECTROCARDIOGRAM TRACING: CPT

## 2024-09-04 PROCEDURE — 80048 BASIC METABOLIC PNL TOTAL CA: CPT | Performed by: STUDENT IN AN ORGANIZED HEALTH CARE EDUCATION/TRAINING PROGRAM

## 2024-09-04 NOTE — DISCHARGE INSTRUCTIONS
Take the following medications the morning of surgery: NONE      If you are on prescription narcotic pain medication to control your pain you may also take that medication the morning of surgery.      General Instructions:     Do not eat solid food after midnight the night before surgery.  Clear liquids day of surgery are allowed but must be stopped at least two hours before your hospital arrival time.       Allowed clear liquids      Water, sodas, and tea or coffee with no cream or milk added.       12 to 20 ounces of a clear liquid that contains carbohydrates is recommended.  If non-diabetic, have Gatorade or Powerade.  If diabetic, have G2 or Powerade Zero.     Do not have liquids red in color.  Do not consume chicken, beef, pork or vegetable broth or bouillon cubes of any variety as they are not considered clear liquids and are not allowed.      Bring any papers given to you in the doctor’s office.  Wear clean comfortable clothes.  Do not wear contact lenses, false eyelashes or make-up.  Bring a case for your glasses.   Remove all piercings.  Leave jewelry and any other valuables at home.  The Pre-Admission Testing nurse will instruct you to bring medications if unable to obtain an accurate list in Pre-Admission Testing.            Preventing a Surgical Site Infection:  For 2 to 3 days before surgery, avoid shaving with a razor because the razor can irritate skin and make it easier to develop an infection.    Any areas of open skin can increase the risk of a post-operative wound infection by allowing bacteria to enter and travel throughout the body.  Notify your surgeon if you have any skin wounds / rashes even if it is not near the expected surgical site.  The area will need assessed to determine if surgery should be delayed until it is healed.  The night prior to surgery shower using a fresh bar of anti-bacterial soap (such as Dial) and clean washcloth.  Sleep in a clean bed with clean clothing.  Do not allow  pets to sleep with you.  Shower on the morning of surgery using a fresh bar of anti-bacterial soap (such as Dial) and clean washcloth.  Dry with a clean towel and dress in clean clothing.  Ask your surgeon if you will be receiving antibiotics prior to surgery.  Make sure you, your family, and all healthcare providers clean their hands with soap and water or an alcohol based hand  before caring for you or your wound.    Day of surgery:  Your arrival time is approximately two hours before your scheduled surgery time.  Please note if you have an early arrival time the surgery doors do not open before 5:00 AM.  Upon arrival, a Pre-op nurse and Anesthesiologist will review your health history, obtain vital signs, and answer questions you may have.  The only belongings needed at this time will be a list of your home medications and if applicable your C-PAP/BI-PAP machine.  A Pre-op nurse will start an IV and you may receive medication in preparation for surgery, including something to help you relax.     Please be aware that surgery does come with discomfort.  We want to make every effort to control your discomfort so please discuss any uncontrolled symptoms with your nurse.   Your doctor will most likely have prescribed pain medications.      If you are going home after surgery you will receive individualized written care instructions before being discharged.  A responsible adult must drive you to and from the hospital on the day of your surgery and ideally stay with you through the night.   .  Discharge prescriptions can be filled by the hospital pharmacy during regular pharmacy hours.  If you are having surgery late in the day/evening your prescription may be e-prescribed to your pharmacy.  Please verify your pharmacy hours or chose a 24 hour pharmacy to avoid not having access to your prescription because your pharmacy has closed for the day.    If you are staying overnight following surgery, you will be  transported to your hospital room following the recovery period.  [facility] has all private rooms.    If you have any questions please call Pre-Admission Testing at [phone].  Deductibles and co-payments are collected on the day of service. Please be prepared to pay the required co-pay, deductible or deposit on the day of service as defined by your plan.    Call your surgeon immediately if you experience any of the following symptoms:  Sore Throat  Shortness of Breath or difficulty breathing  Cough  Chills  Body soreness or muscle pain  Headache  Fever  New loss of taste or smell  Do not arrive for your surgery ill.  Your procedure will need to be rescheduled to another time.  You will need to call your physician before the day of surgery to avoid any unnecessary exposure to hospital staff as well as other patients.

## 2024-09-05 ENCOUNTER — APPOINTMENT (OUTPATIENT)
Dept: WOMENS IMAGING | Facility: HOSPITAL | Age: 74
End: 2024-09-05
Payer: MEDICARE

## 2024-09-05 PROCEDURE — 19285 PERQ DEV BREAST 1ST US IMAG: CPT | Performed by: RADIOLOGY

## 2024-09-05 PROCEDURE — A4648 IMPLANTABLE TISSUE MARKER: HCPCS | Performed by: RADIOLOGY

## 2024-09-06 DIAGNOSIS — N60.92 ATYPICAL LOBULAR HYPERPLASIA (ALH) OF LEFT BREAST: Primary | ICD-10-CM

## 2024-09-09 ENCOUNTER — TELEPHONE (OUTPATIENT)
Dept: SURGERY | Facility: CLINIC | Age: 74
End: 2024-09-09
Payer: MEDICARE

## 2024-09-09 NOTE — TELEPHONE ENCOUNTER
Called Ms. Cesar to discuss her upcoming surgery.  She had preoperative Annabelle's placed to her biopsy-proven cancer in the right breast.  Her surgical plan after meeting with Dr. Sanchez is to proceed with oncoplastic, lift procedure.  As she is planning to undergo significant rearrangement of her remaining breast tissue I do recommend excision of the left breast ALH.  There is the possibility of a preop ANNABELLE placement tomorrow at [facility].  Discussed this change in surgical plan with her.  Will update the case request.  Ms. Cesar and my office will discuss the details of time and location to undergo ANNABELLE placement for definitive surgical management of her breast cancer and atypia.

## 2024-09-09 NOTE — TELEPHONE ENCOUNTER
Pt notified her huam has been scheduled for 9/10/2024 at [facility] at 9:30..   Pt verbalized understanding of appt time, date and location.     CMA

## 2024-09-10 ENCOUNTER — HOSPITAL ENCOUNTER (OUTPATIENT)
Dept: MAMMOGRAPHY | Facility: HOSPITAL | Age: 74
Discharge: HOME | End: 2024-09-10
Admitting: STUDENT IN AN ORGANIZED HEALTH CARE EDUCATION/TRAINING PROGRAM
Payer: MEDICARE

## 2024-09-10 DIAGNOSIS — N60.92 ATYPICAL LOBULAR HYPERPLASIA (ALH) OF LEFT BREAST: ICD-10-CM

## 2024-09-10 PROCEDURE — A4648 IMPLANTABLE TISSUE MARKER: HCPCS

## 2024-09-10 PROCEDURE — 25010000002 LIDOCAINE 1 % SOLUTION: Performed by: STUDENT IN AN ORGANIZED HEALTH CARE EDUCATION/TRAINING PROGRAM

## 2024-09-10 RX ORDER — LIDOCAINE HYDROCHLORIDE 10 MG/ML
7 INJECTION, SOLUTION INFILTRATION; PERINEURAL ONCE
Status: COMPLETED | OUTPATIENT
Start: 2024-09-10 | End: 2024-09-10

## 2024-09-10 RX ADMIN — Medication 7 ML: at 10:15

## 2024-09-12 ENCOUNTER — HOSPITAL ENCOUNTER (OUTPATIENT)
Facility: HOSPITAL | Age: 74
Setting detail: HOSPITAL OUTPATIENT SURGERY
Discharge: HOME | End: 2024-09-12
Attending: STUDENT IN AN ORGANIZED HEALTH CARE EDUCATION/TRAINING PROGRAM | Admitting: STUDENT IN AN ORGANIZED HEALTH CARE EDUCATION/TRAINING PROGRAM
Payer: MEDICARE

## 2024-09-12 ENCOUNTER — ANESTHESIA (OUTPATIENT)
Dept: PERIOP | Facility: HOSPITAL | Age: 74
End: 2024-09-12
Payer: MEDICARE

## 2024-09-12 ENCOUNTER — HOSPITAL ENCOUNTER (OUTPATIENT)
Dept: NUCLEAR MEDICINE | Facility: HOSPITAL | Age: 74
Discharge: HOME | End: 2024-09-12
Payer: MEDICARE

## 2024-09-12 ENCOUNTER — TRANSCRIBE ORDERS (OUTPATIENT)
Dept: LAB | Facility: HOSPITAL | Age: 74
End: 2024-09-12
Payer: MEDICARE

## 2024-09-12 ENCOUNTER — APPOINTMENT (OUTPATIENT)
Dept: GENERAL RADIOLOGY | Facility: HOSPITAL | Age: 74
End: 2024-09-12
Payer: MEDICARE

## 2024-09-12 ENCOUNTER — ANESTHESIA EVENT (OUTPATIENT)
Dept: PERIOP | Facility: HOSPITAL | Age: 74
End: 2024-09-12
Payer: MEDICARE

## 2024-09-12 ENCOUNTER — ANCILLARY PROCEDURE (OUTPATIENT)
Dept: LAB | Facility: HOSPITAL | Age: 74
End: 2024-09-12
Payer: MEDICARE

## 2024-09-12 VITALS
OXYGEN SATURATION: 99 % | RESPIRATION RATE: 16 BRPM | TEMPERATURE: 97.4 F | DIASTOLIC BLOOD PRESSURE: 84 MMHG | SYSTOLIC BLOOD PRESSURE: 120 MMHG | HEART RATE: 78 BPM

## 2024-09-12 DIAGNOSIS — Z17.0 MALIGNANT NEOPLASM OF UPPER-OUTER QUADRANT OF RIGHT BREAST IN FEMALE, ESTROGEN RECEPTOR POSITIVE: ICD-10-CM

## 2024-09-12 DIAGNOSIS — C50.411 MALIGNANT NEOPLASM OF UPPER-OUTER QUADRANT OF RIGHT BREAST IN FEMALE, ESTROGEN RECEPTOR POSITIVE: ICD-10-CM

## 2024-09-12 DIAGNOSIS — Z98.890 H/O BILATERAL BREAST BIOPSY: Primary | ICD-10-CM

## 2024-09-12 DIAGNOSIS — Z98.890 H/O BILATERAL BREAST BIOPSY: ICD-10-CM

## 2024-09-12 PROBLEM — N60.92 ATYPICAL LOBULAR HYPERPLASIA (ALH) OF LEFT BREAST: Status: ACTIVE | Noted: 2024-09-12

## 2024-09-12 PROCEDURE — 76098 X-RAY EXAM SURGICAL SPECIMEN: CPT

## 2024-09-12 PROCEDURE — 25010000002 HYDROMORPHONE 1 MG/ML SOLUTION: Performed by: NURSE ANESTHETIST, CERTIFIED REGISTERED

## 2024-09-12 PROCEDURE — 25810000003 LACTATED RINGERS PER 1000 ML: Performed by: PLASTIC SURGERY

## 2024-09-12 PROCEDURE — 25010000002 CEFAZOLIN PER 500 MG: Performed by: PLASTIC SURGERY

## 2024-09-12 PROCEDURE — 88307 TISSUE EXAM BY PATHOLOGIST: CPT | Performed by: STUDENT IN AN ORGANIZED HEALTH CARE EDUCATION/TRAINING PROGRAM

## 2024-09-12 PROCEDURE — 88305 TISSUE EXAM BY PATHOLOGIST: CPT | Performed by: PLASTIC SURGERY

## 2024-09-12 PROCEDURE — S0260 H&P FOR SURGERY: HCPCS | Performed by: STUDENT IN AN ORGANIZED HEALTH CARE EDUCATION/TRAINING PROGRAM

## 2024-09-12 PROCEDURE — 25810000003 LACTATED RINGERS PER 1000 ML: Performed by: ANESTHESIOLOGY

## 2024-09-12 PROCEDURE — 25010000002 ONDANSETRON PER 1 MG: Performed by: NURSE ANESTHETIST, CERTIFIED REGISTERED

## 2024-09-12 PROCEDURE — 25010000002 FENTANYL CITRATE (PF) 50 MCG/ML SOLUTION: Performed by: ANESTHESIOLOGY

## 2024-09-12 PROCEDURE — 63710000001 ONDANSETRON ODT 4 MG TABLET DISPERSIBLE: Performed by: PLASTIC SURGERY

## 2024-09-12 PROCEDURE — 25010000002 ISOSULFAN BLUE 1 % SOLUTION: Performed by: STUDENT IN AN ORGANIZED HEALTH CARE EDUCATION/TRAINING PROGRAM

## 2024-09-12 PROCEDURE — 25010000002 PROPOFOL 10 MG/ML EMULSION: Performed by: NURSE ANESTHETIST, CERTIFIED REGISTERED

## 2024-09-12 PROCEDURE — 25010000002 GLYCOPYRROLATE 0.2 MG/ML SOLUTION: Performed by: NURSE ANESTHETIST, CERTIFIED REGISTERED

## 2024-09-12 PROCEDURE — 0 TECHETIUM TC99M TILMANOCEPT: Performed by: STUDENT IN AN ORGANIZED HEALTH CARE EDUCATION/TRAINING PROGRAM

## 2024-09-12 PROCEDURE — 88341 IMHCHEM/IMCYTCHM EA ADD ANTB: CPT | Performed by: STUDENT IN AN ORGANIZED HEALTH CARE EDUCATION/TRAINING PROGRAM

## 2024-09-12 PROCEDURE — 25010000002 SUCCINYLCHOLINE PER 20 MG: Performed by: NURSE ANESTHETIST, CERTIFIED REGISTERED

## 2024-09-12 PROCEDURE — 38792 RA TRACER ID OF SENTINL NODE: CPT

## 2024-09-12 PROCEDURE — A9520 TC99 TILMANOCEPT DIAG 0.5MCI: HCPCS | Performed by: STUDENT IN AN ORGANIZED HEALTH CARE EDUCATION/TRAINING PROGRAM

## 2024-09-12 PROCEDURE — 38900 IO MAP OF SENT LYMPH NODE: CPT | Performed by: STUDENT IN AN ORGANIZED HEALTH CARE EDUCATION/TRAINING PROGRAM

## 2024-09-12 PROCEDURE — 38525 BIOPSY/REMOVAL LYMPH NODES: CPT | Performed by: STUDENT IN AN ORGANIZED HEALTH CARE EDUCATION/TRAINING PROGRAM

## 2024-09-12 PROCEDURE — 25010000002 CEFAZOLIN PER 500 MG: Performed by: STUDENT IN AN ORGANIZED HEALTH CARE EDUCATION/TRAINING PROGRAM

## 2024-09-12 PROCEDURE — 88342 IMHCHEM/IMCYTCHM 1ST ANTB: CPT | Performed by: STUDENT IN AN ORGANIZED HEALTH CARE EDUCATION/TRAINING PROGRAM

## 2024-09-12 PROCEDURE — 19301 PARTIAL MASTECTOMY: CPT | Performed by: STUDENT IN AN ORGANIZED HEALTH CARE EDUCATION/TRAINING PROGRAM

## 2024-09-12 PROCEDURE — 19125 EXCISION BREAST LESION: CPT | Performed by: STUDENT IN AN ORGANIZED HEALTH CARE EDUCATION/TRAINING PROGRAM

## 2024-09-12 PROCEDURE — 78195 LYMPH SYSTEM IMAGING: CPT | Performed by: STUDENT IN AN ORGANIZED HEALTH CARE EDUCATION/TRAINING PROGRAM

## 2024-09-12 PROCEDURE — 25010000002 GENTAMICIN PER 80 MG: Performed by: PLASTIC SURGERY

## 2024-09-12 PROCEDURE — 88360 TUMOR IMMUNOHISTOCHEM/MANUAL: CPT | Performed by: STUDENT IN AN ORGANIZED HEALTH CARE EDUCATION/TRAINING PROGRAM

## 2024-09-12 PROCEDURE — 25010000002 ROPIVACAINE PER 1 MG: Performed by: STUDENT IN AN ORGANIZED HEALTH CARE EDUCATION/TRAINING PROGRAM

## 2024-09-12 PROCEDURE — 25010000002 DEXAMETHASONE PER 1 MG: Performed by: NURSE ANESTHETIST, CERTIFIED REGISTERED

## 2024-09-12 DEVICE — LIGACLIP MCA MULTIPLE CLIP APPLIERS, 20 MEDIUM CLIPS
Type: IMPLANTABLE DEVICE | Site: BREAST | Status: FUNCTIONAL
Brand: LIGACLIP

## 2024-09-12 DEVICE — DEV CONTRL TISS STRATAFIXSPIRALMNCRYL PLSPS2 REV3/0 45CM: Type: IMPLANTABLE DEVICE | Site: BREAST | Status: FUNCTIONAL

## 2024-09-12 RX ORDER — ONDANSETRON 2 MG/ML
4 INJECTION INTRAMUSCULAR; INTRAVENOUS ONCE AS NEEDED
Status: DISCONTINUED | OUTPATIENT
Start: 2024-09-12 | End: 2024-09-12 | Stop reason: HOSPADM

## 2024-09-12 RX ORDER — CELECOXIB 200 MG/1
400 CAPSULE ORAL ONCE
Status: COMPLETED | OUTPATIENT
Start: 2024-09-12 | End: 2024-09-12

## 2024-09-12 RX ORDER — AMOXICILLIN 250 MG
2 CAPSULE ORAL DAILY PRN
Qty: 30 TABLET | Refills: 1 | Status: SHIPPED | OUTPATIENT
Start: 2024-09-12 | End: 2025-09-12

## 2024-09-12 RX ORDER — GABAPENTIN 100 MG/1
100 CAPSULE ORAL EVERY 8 HOURS
Qty: 60 CAPSULE | Refills: 1 | Status: SHIPPED | OUTPATIENT
Start: 2024-09-12 | End: 2025-09-12

## 2024-09-12 RX ORDER — SODIUM CHLORIDE, SODIUM LACTATE, POTASSIUM CHLORIDE, CALCIUM CHLORIDE 600; 310; 30; 20 MG/100ML; MG/100ML; MG/100ML; MG/100ML
9 INJECTION, SOLUTION INTRAVENOUS CONTINUOUS
Status: DISCONTINUED | OUTPATIENT
Start: 2024-09-12 | End: 2024-09-12 | Stop reason: HOSPADM

## 2024-09-12 RX ORDER — ONDANSETRON 2 MG/ML
INJECTION INTRAMUSCULAR; INTRAVENOUS AS NEEDED
Status: DISCONTINUED | OUTPATIENT
Start: 2024-09-12 | End: 2024-09-12 | Stop reason: SURG

## 2024-09-12 RX ORDER — GLYCOPYRROLATE 0.2 MG/ML
INJECTION INTRAMUSCULAR; INTRAVENOUS AS NEEDED
Status: DISCONTINUED | OUTPATIENT
Start: 2024-09-12 | End: 2024-09-12 | Stop reason: SURG

## 2024-09-12 RX ORDER — ROCURONIUM BROMIDE 10 MG/ML
INJECTION, SOLUTION INTRAVENOUS AS NEEDED
Status: DISCONTINUED | OUTPATIENT
Start: 2024-09-12 | End: 2024-09-12 | Stop reason: SURG

## 2024-09-12 RX ORDER — LIDOCAINE 40 MG/G
1 CREAM TOPICAL AS NEEDED
Status: DISCONTINUED | OUTPATIENT
Start: 2024-09-12 | End: 2024-09-12 | Stop reason: HOSPADM

## 2024-09-12 RX ORDER — SODIUM CHLORIDE, SODIUM LACTATE, POTASSIUM CHLORIDE, CALCIUM CHLORIDE 600; 310; 30; 20 MG/100ML; MG/100ML; MG/100ML; MG/100ML
125 INJECTION, SOLUTION INTRAVENOUS CONTINUOUS
Status: DISCONTINUED | OUTPATIENT
Start: 2024-09-12 | End: 2024-09-12 | Stop reason: HOSPADM

## 2024-09-12 RX ORDER — ACETAMINOPHEN 500 MG
1000 TABLET ORAL ONCE
Status: COMPLETED | OUTPATIENT
Start: 2024-09-12 | End: 2024-09-12

## 2024-09-12 RX ORDER — PROPOFOL 10 MG/ML
VIAL (ML) INTRAVENOUS AS NEEDED
Status: DISCONTINUED | OUTPATIENT
Start: 2024-09-12 | End: 2024-09-12 | Stop reason: SURG

## 2024-09-12 RX ORDER — EPHEDRINE SULFATE 50 MG/ML
INJECTION, SOLUTION INTRAVENOUS AS NEEDED
Status: DISCONTINUED | OUTPATIENT
Start: 2024-09-12 | End: 2024-09-12 | Stop reason: SURG

## 2024-09-12 RX ORDER — DOCUSATE SODIUM 250 MG
250 CAPSULE ORAL DAILY PRN
Qty: 30 CAPSULE | Refills: 1 | Status: SHIPPED | OUTPATIENT
Start: 2024-09-12

## 2024-09-12 RX ORDER — HYDRALAZINE HYDROCHLORIDE 20 MG/ML
5 INJECTION INTRAMUSCULAR; INTRAVENOUS
Status: DISCONTINUED | OUTPATIENT
Start: 2024-09-12 | End: 2024-09-12 | Stop reason: HOSPADM

## 2024-09-12 RX ORDER — DIPHENHYDRAMINE HYDROCHLORIDE 50 MG/ML
12.5 INJECTION INTRAMUSCULAR; INTRAVENOUS
Status: DISCONTINUED | OUTPATIENT
Start: 2024-09-12 | End: 2024-09-12 | Stop reason: HOSPADM

## 2024-09-12 RX ORDER — KETAMINE HYDROCHLORIDE 10 MG/ML
INJECTION, SOLUTION INTRAMUSCULAR; INTRAVENOUS AS NEEDED
Status: DISCONTINUED | OUTPATIENT
Start: 2024-09-12 | End: 2024-09-12 | Stop reason: SURG

## 2024-09-12 RX ORDER — LIDOCAINE HYDROCHLORIDE 20 MG/ML
INJECTION, SOLUTION INFILTRATION; PERINEURAL AS NEEDED
Status: DISCONTINUED | OUTPATIENT
Start: 2024-09-12 | End: 2024-09-12 | Stop reason: SURG

## 2024-09-12 RX ORDER — ONDANSETRON 4 MG/1
8 TABLET, ORALLY DISINTEGRATING ORAL ONCE
Status: COMPLETED | OUTPATIENT
Start: 2024-09-12 | End: 2024-09-12

## 2024-09-12 RX ORDER — HYDROCODONE BITARTRATE AND ACETAMINOPHEN 7.5; 325 MG/1; MG/1
1 TABLET ORAL EVERY 4 HOURS PRN
Status: DISCONTINUED | OUTPATIENT
Start: 2024-09-12 | End: 2024-09-12 | Stop reason: HOSPADM

## 2024-09-12 RX ORDER — MAGNESIUM HYDROXIDE 1200 MG/15ML
LIQUID ORAL AS NEEDED
Status: DISCONTINUED | OUTPATIENT
Start: 2024-09-12 | End: 2024-09-12 | Stop reason: HOSPADM

## 2024-09-12 RX ORDER — LIDOCAINE HYDROCHLORIDE 10 MG/ML
0.5 INJECTION, SOLUTION INFILTRATION; PERINEURAL ONCE AS NEEDED
Status: DISCONTINUED | OUTPATIENT
Start: 2024-09-12 | End: 2024-09-12 | Stop reason: HOSPADM

## 2024-09-12 RX ORDER — DROPERIDOL 2.5 MG/ML
0.62 INJECTION, SOLUTION INTRAMUSCULAR; INTRAVENOUS
Status: DISCONTINUED | OUTPATIENT
Start: 2024-09-12 | End: 2024-09-12 | Stop reason: HOSPADM

## 2024-09-12 RX ORDER — GINSENG 100 MG
CAPSULE ORAL AS NEEDED
Status: DISCONTINUED | OUTPATIENT
Start: 2024-09-12 | End: 2024-09-12 | Stop reason: HOSPADM

## 2024-09-12 RX ORDER — DEXMEDETOMIDINE HYDROCHLORIDE 100 UG/ML
INJECTION, SOLUTION INTRAVENOUS AS NEEDED
Status: DISCONTINUED | OUTPATIENT
Start: 2024-09-12 | End: 2024-09-12 | Stop reason: SURG

## 2024-09-12 RX ORDER — SUCCINYLCHOLINE CHLORIDE 20 MG/ML
INJECTION INTRAMUSCULAR; INTRAVENOUS AS NEEDED
Status: DISCONTINUED | OUTPATIENT
Start: 2024-09-12 | End: 2024-09-12 | Stop reason: SURG

## 2024-09-12 RX ORDER — ACETAMINOPHEN 325 MG/1
650 TABLET ORAL EVERY 4 HOURS PRN
Qty: 60 TABLET | Refills: 0 | Status: SHIPPED | OUTPATIENT
Start: 2024-09-12

## 2024-09-12 RX ORDER — FENTANYL CITRATE 50 UG/ML
50 INJECTION, SOLUTION INTRAMUSCULAR; INTRAVENOUS ONCE AS NEEDED
Status: COMPLETED | OUTPATIENT
Start: 2024-09-12 | End: 2024-09-12

## 2024-09-12 RX ORDER — SODIUM CHLORIDE 0.9 % (FLUSH) 0.9 %
3-10 SYRINGE (ML) INJECTION AS NEEDED
Status: DISCONTINUED | OUTPATIENT
Start: 2024-09-12 | End: 2024-09-12 | Stop reason: HOSPADM

## 2024-09-12 RX ORDER — PROMETHAZINE HYDROCHLORIDE 25 MG/1
25 SUPPOSITORY RECTAL ONCE AS NEEDED
Status: DISCONTINUED | OUTPATIENT
Start: 2024-09-12 | End: 2024-09-12 | Stop reason: HOSPADM

## 2024-09-12 RX ORDER — SODIUM CHLORIDE 0.9 % (FLUSH) 0.9 %
3 SYRINGE (ML) INJECTION EVERY 12 HOURS SCHEDULED
Status: DISCONTINUED | OUTPATIENT
Start: 2024-09-12 | End: 2024-09-12 | Stop reason: HOSPADM

## 2024-09-12 RX ORDER — HYDROMORPHONE HYDROCHLORIDE 1 MG/ML
0.25 INJECTION, SOLUTION INTRAMUSCULAR; INTRAVENOUS; SUBCUTANEOUS
Status: DISCONTINUED | OUTPATIENT
Start: 2024-09-12 | End: 2024-09-12 | Stop reason: HOSPADM

## 2024-09-12 RX ORDER — ISOSULFAN BLUE 50 MG/5ML
INJECTION, SOLUTION SUBCUTANEOUS AS NEEDED
Status: DISCONTINUED | OUTPATIENT
Start: 2024-09-12 | End: 2024-09-12 | Stop reason: HOSPADM

## 2024-09-12 RX ORDER — HYDROCODONE BITARTRATE AND ACETAMINOPHEN 5; 325 MG/1; MG/1
1 TABLET ORAL EVERY 4 HOURS PRN
Qty: 20 TABLET | Refills: 0 | Status: SHIPPED | OUTPATIENT
Start: 2024-09-12

## 2024-09-12 RX ORDER — GABAPENTIN 300 MG/1
300 CAPSULE ORAL ONCE
Status: COMPLETED | OUTPATIENT
Start: 2024-09-12 | End: 2024-09-12

## 2024-09-12 RX ORDER — PROMETHAZINE HYDROCHLORIDE 25 MG/1
25 TABLET ORAL ONCE AS NEEDED
Status: DISCONTINUED | OUTPATIENT
Start: 2024-09-12 | End: 2024-09-12 | Stop reason: HOSPADM

## 2024-09-12 RX ORDER — LABETALOL HYDROCHLORIDE 5 MG/ML
5 INJECTION, SOLUTION INTRAVENOUS
Status: DISCONTINUED | OUTPATIENT
Start: 2024-09-12 | End: 2024-09-12 | Stop reason: HOSPADM

## 2024-09-12 RX ORDER — FAMOTIDINE 10 MG/ML
20 INJECTION, SOLUTION INTRAVENOUS ONCE
Status: COMPLETED | OUTPATIENT
Start: 2024-09-12 | End: 2024-09-12

## 2024-09-12 RX ORDER — TRANEXAMIC ACID 100 MG/ML
INJECTION, SOLUTION INTRAVENOUS AS NEEDED
Status: DISCONTINUED | OUTPATIENT
Start: 2024-09-12 | End: 2024-09-12 | Stop reason: SURG

## 2024-09-12 RX ORDER — EPHEDRINE SULFATE 50 MG/ML
5 INJECTION, SOLUTION INTRAVENOUS ONCE AS NEEDED
Status: DISCONTINUED | OUTPATIENT
Start: 2024-09-12 | End: 2024-09-12 | Stop reason: HOSPADM

## 2024-09-12 RX ORDER — DEXAMETHASONE SODIUM PHOSPHATE 4 MG/ML
INJECTION, SOLUTION INTRA-ARTICULAR; INTRALESIONAL; INTRAMUSCULAR; INTRAVENOUS; SOFT TISSUE AS NEEDED
Status: DISCONTINUED | OUTPATIENT
Start: 2024-09-12 | End: 2024-09-12 | Stop reason: SURG

## 2024-09-12 RX ORDER — IPRATROPIUM BROMIDE AND ALBUTEROL SULFATE 2.5; .5 MG/3ML; MG/3ML
3 SOLUTION RESPIRATORY (INHALATION) ONCE AS NEEDED
Status: DISCONTINUED | OUTPATIENT
Start: 2024-09-12 | End: 2024-09-12 | Stop reason: HOSPADM

## 2024-09-12 RX ORDER — FENTANYL CITRATE 50 UG/ML
25 INJECTION, SOLUTION INTRAMUSCULAR; INTRAVENOUS
Status: DISCONTINUED | OUTPATIENT
Start: 2024-09-12 | End: 2024-09-12 | Stop reason: HOSPADM

## 2024-09-12 RX ORDER — HYDROCODONE BITARTRATE AND ACETAMINOPHEN 5; 325 MG/1; MG/1
1 TABLET ORAL ONCE AS NEEDED
Status: COMPLETED | OUTPATIENT
Start: 2024-09-12 | End: 2024-09-12

## 2024-09-12 RX ORDER — NALOXONE HCL 0.4 MG/ML
0.2 VIAL (ML) INJECTION AS NEEDED
Status: DISCONTINUED | OUTPATIENT
Start: 2024-09-12 | End: 2024-09-12 | Stop reason: HOSPADM

## 2024-09-12 RX ORDER — ONDANSETRON 8 MG/1
8 TABLET, ORALLY DISINTEGRATING ORAL EVERY 8 HOURS PRN
Qty: 10 TABLET | Refills: 1 | Status: SHIPPED | OUTPATIENT
Start: 2024-09-12

## 2024-09-12 RX ORDER — DIAZEPAM 5 MG
10 TABLET ORAL ONCE
Status: COMPLETED | OUTPATIENT
Start: 2024-09-12 | End: 2024-09-12

## 2024-09-12 RX ORDER — ROPIVACAINE HYDROCHLORIDE 5 MG/ML
INJECTION, SOLUTION EPIDURAL; INFILTRATION; PERINEURAL AS NEEDED
Status: DISCONTINUED | OUTPATIENT
Start: 2024-09-12 | End: 2024-09-12 | Stop reason: HOSPADM

## 2024-09-12 RX ORDER — MIDAZOLAM HYDROCHLORIDE 1 MG/ML
0.5 INJECTION INTRAMUSCULAR; INTRAVENOUS
Status: DISCONTINUED | OUTPATIENT
Start: 2024-09-12 | End: 2024-09-12 | Stop reason: HOSPADM

## 2024-09-12 RX ORDER — FLUMAZENIL 0.1 MG/ML
0.2 INJECTION INTRAVENOUS AS NEEDED
Status: DISCONTINUED | OUTPATIENT
Start: 2024-09-12 | End: 2024-09-12 | Stop reason: HOSPADM

## 2024-09-12 RX ADMIN — EPHEDRINE SULFATE 5 MG: 50 INJECTION INTRAVENOUS at 11:00

## 2024-09-12 RX ADMIN — ONDANSETRON 8 MG: 4 TABLET, ORALLY DISINTEGRATING ORAL at 06:56

## 2024-09-12 RX ADMIN — CELECOXIB 400 MG: 200 CAPSULE ORAL at 06:56

## 2024-09-12 RX ADMIN — GABAPENTIN 300 MG: 300 CAPSULE ORAL at 08:21

## 2024-09-12 RX ADMIN — ROCURONIUM BROMIDE 10 MG: 10 INJECTION, SOLUTION INTRAVENOUS at 09:22

## 2024-09-12 RX ADMIN — DEXMEDETOMIDINE HCL 6 MCG: 100 INJECTION INTRAVENOUS at 10:37

## 2024-09-12 RX ADMIN — LIDOCAINE HYDROCHLORIDE 20 MG: 20 INJECTION, SOLUTION INFILTRATION; PERINEURAL at 12:33

## 2024-09-12 RX ADMIN — ACETAMINOPHEN 1000 MG: 500 TABLET ORAL at 06:56

## 2024-09-12 RX ADMIN — ONDANSETRON 4 MG: 2 INJECTION INTRAMUSCULAR; INTRAVENOUS at 12:32

## 2024-09-12 RX ADMIN — GLYCOPYRROLATE 0.2 MG: 0.2 INJECTION INTRAMUSCULAR; INTRAVENOUS at 09:44

## 2024-09-12 RX ADMIN — HYDROMORPHONE HYDROCHLORIDE 0.25 MG: 1 INJECTION, SOLUTION INTRAMUSCULAR; INTRAVENOUS; SUBCUTANEOUS at 09:38

## 2024-09-12 RX ADMIN — EPHEDRINE SULFATE 10 MG: 50 INJECTION INTRAVENOUS at 09:46

## 2024-09-12 RX ADMIN — DEXMEDETOMIDINE HCL 4 MCG: 100 INJECTION INTRAVENOUS at 09:49

## 2024-09-12 RX ADMIN — SODIUM CHLORIDE, POTASSIUM CHLORIDE, SODIUM LACTATE AND CALCIUM CHLORIDE: 600; 310; 30; 20 INJECTION, SOLUTION INTRAVENOUS at 12:06

## 2024-09-12 RX ADMIN — SODIUM CHLORIDE, POTASSIUM CHLORIDE, SODIUM LACTATE AND CALCIUM CHLORIDE 9 ML/HR: 600; 310; 30; 20 INJECTION, SOLUTION INTRAVENOUS at 06:46

## 2024-09-12 RX ADMIN — PROPOFOL 150 MG: 10 INJECTION, EMULSION INTRAVENOUS at 09:22

## 2024-09-12 RX ADMIN — HYDROCODONE BITARTRATE AND ACETAMINOPHEN 1 TABLET: 5; 325 TABLET ORAL at 13:41

## 2024-09-12 RX ADMIN — TRANEXAMIC ACID 1000 MG: 100 INJECTION, SOLUTION INTRAVENOUS at 11:40

## 2024-09-12 RX ADMIN — LIDOCAINE HYDROCHLORIDE 40 MG: 20 INJECTION, SOLUTION INFILTRATION; PERINEURAL at 09:33

## 2024-09-12 RX ADMIN — PROPOFOL 50 MG: 10 INJECTION, EMULSION INTRAVENOUS at 09:31

## 2024-09-12 RX ADMIN — KETAMINE HYDROCHLORIDE 10 MG: 10 INJECTION INTRAMUSCULAR; INTRAVENOUS at 10:46

## 2024-09-12 RX ADMIN — DEXAMETHASONE SODIUM PHOSPHATE 8 MG: 4 INJECTION, SOLUTION INTRA-ARTICULAR; INTRALESIONAL; INTRAMUSCULAR; INTRAVENOUS; SOFT TISSUE at 09:56

## 2024-09-12 RX ADMIN — TILMANOCEPT 1 DOSE: KIT at 07:33

## 2024-09-12 RX ADMIN — LIDOCAINE 4% 1 APPLICATION: 4 CREAM TOPICAL at 06:46

## 2024-09-12 RX ADMIN — FAMOTIDINE 20 MG: 10 INJECTION INTRAVENOUS at 06:56

## 2024-09-12 RX ADMIN — SODIUM CHLORIDE 2000 MG: 900 INJECTION INTRAVENOUS at 09:12

## 2024-09-12 RX ADMIN — FENTANYL CITRATE 50 MCG: 50 INJECTION, SOLUTION INTRAMUSCULAR; INTRAVENOUS at 09:22

## 2024-09-12 RX ADMIN — DIAZEPAM 10 MG: 5 TABLET ORAL at 06:28

## 2024-09-12 RX ADMIN — SODIUM CHLORIDE, POTASSIUM CHLORIDE, SODIUM LACTATE AND CALCIUM CHLORIDE 125 ML/HR: 600; 310; 30; 20 INJECTION, SOLUTION INTRAVENOUS at 08:22

## 2024-09-12 RX ADMIN — KETAMINE HYDROCHLORIDE 10 MG: 10 INJECTION INTRAMUSCULAR; INTRAVENOUS at 11:55

## 2024-09-12 RX ADMIN — DEXMEDETOMIDINE HCL 4 MCG: 100 INJECTION INTRAVENOUS at 09:37

## 2024-09-12 RX ADMIN — LIDOCAINE HYDROCHLORIDE 40 MG: 20 INJECTION, SOLUTION INFILTRATION; PERINEURAL at 09:22

## 2024-09-12 RX ADMIN — KETAMINE HYDROCHLORIDE 30 MG: 10 INJECTION INTRAMUSCULAR; INTRAVENOUS at 09:52

## 2024-09-12 RX ADMIN — EPHEDRINE SULFATE 5 MG: 50 INJECTION INTRAVENOUS at 10:58

## 2024-09-12 RX ADMIN — PROPOFOL 25 MCG/KG/MIN: 10 INJECTION, EMULSION INTRAVENOUS at 09:51

## 2024-09-12 RX ADMIN — HYDROMORPHONE HYDROCHLORIDE 0.25 MG: 1 INJECTION, SOLUTION INTRAMUSCULAR; INTRAVENOUS; SUBCUTANEOUS at 09:31

## 2024-09-12 RX ADMIN — SUCCINYLCHOLINE CHLORIDE 80 MG: 20 INJECTION, SOLUTION INTRAMUSCULAR; INTRAVENOUS; PARENTERAL at 09:23

## 2024-09-12 NOTE — INTERVAL H&P NOTE
H&P reviewed.  The patient was examined and given her area of ADH on the left I discussed with Dr. Mosley we will also do an excision on the left and closure on the left side as well.

## 2024-09-12 NOTE — ANESTHESIA POSTPROCEDURE EVALUATION
Patient: Verito العراقي    Procedure Summary       Date: 09/12/24 Room / Location: [facility] OR  / [facility] MAIN OR    Anesthesia Start: 0917 Anesthesia Stop: 1256    Procedures:       Right breast partial mastectomy, NACHO guided to 2 sites.  Right sentinel lymph node biopsy.  Left NACHO guided excisional biopsy (Bilateral: Breast)      RIGHT ONCOPLASTIC AND LEFT MASTOPEXY FOR SYMMETRY, BILATERAL REMOVAL OF BREAST IMPLANTS (Bilateral: Chest) Diagnosis:       Malignant neoplasm of upper-outer quadrant of right breast in female, estrogen receptor positive      (Malignant neoplasm of upper-outer quadrant of right breast in female, estrogen receptor positive [C50.411, Z17.0])    Surgeons: Marilia Mosley MD; Wayne Sanchez MD Provider: Zach Liz MD    Anesthesia Type: general ASA Status: 2            Anesthesia Type: general    Vitals  Vitals Value Taken Time   /87 09/12/24 1400   Temp 36.3 °C (97.4 °F) 09/12/24 1345   Pulse 78 09/12/24 1404   Resp 16 09/12/24 1400   SpO2 96 % 09/12/24 1404   Vitals shown include unfiled device data.        Post Anesthesia Care and Evaluation    Patient location during evaluation: bedside  Patient participation: complete - patient participated  Level of consciousness: awake and alert  Pain management: adequate    Airway patency: patent  Anesthetic complications: No anesthetic complications    Cardiovascular status: acceptable  Respiratory status: acceptable  Hydration status: acceptable    Comments: /84   Pulse 78   Temp 36.3 °C (97.4 °F)   Resp 16   SpO2 99%

## 2024-09-12 NOTE — H&P
[facility]     Referring Provider: Marilia Mosley MD     Chief complaint: newly diagnosed breast cancer    Subjective    HPI: Ms. Verito العراقي is a 74 y.o. yo woman, seen at the request of Dr. Garrett for a new diagnosis of right breast cancer.  She has been followed by Dr. Garrett for atypical lobular hyperplasia diagnosed earlier this year.  On MRI for high risk she was found to have 2 concerning masses within her right breast.    She underwent bilateral breast augmentation most recently with prepectoral saline implants placed.  Her last surgery she estimates was approximately 20 years ago.    She reports she has a pertinent PMH of hyperlipidemia, anxiety, history of diastolic dysfunction and mitral valve prolapse.     She was joined today in clinic by her . They all participated in the discussion, after her examination, and she gave her consent for them to participate.     HPI 9/12/24  Ready for surgery - Annabelle's placed within cancer and typia.      Oncology/Hematology History   Malignant neoplasm of upper-outer quadrant of right breast in female, estrogen receptor positive   7/25/2024 Cancer Staged    Staging form: Breast, AJCC 8th Edition  - Clinical stage from 7/25/2024: Stage IA (cT1c, cN0, cM0, G2, ER+, KS+, HER2-) - Signed by Marilia Mosley MD on 8/1/2024 7/30/2024 Initial Diagnosis    Malignant neoplasm of upper-outer quadrant of right breast in female, estrogen receptor positive         Review of Systems   Constitutional:  Negative for appetite change, fatigue and fever.   HENT:  Negative.     Eyes: Negative.    Respiratory:  Negative for cough and shortness of breath.    Cardiovascular: Negative.    Gastrointestinal:  Negative for abdominal distention, diarrhea and nausea.   Endocrine: Negative.    Genitourinary: Negative.     Musculoskeletal:  Negative for arthralgias and back pain.   Skin: Negative.    Neurological:  Negative for dizziness, headaches and speech difficulty.    Hematological: Negative.    Psychiatric/Behavioral:  Negative for decreased concentration and sleep disturbance. The patient is nervous/anxious.        Medications:    Current Facility-Administered Medications:     ceFAZolin 2000 mg IVPB in 100 mL NS (MBP), 2,000 mg, Intravenous, Once, Marilia Mosley MD    fentaNYL citrate (PF) (SUBLIMAZE) injection 50 mcg, 50 mcg, Intravenous, Once PRN, Zach Liz MD    lactated ringers infusion, 9 mL/hr, Intravenous, Continuous, Zach Liz MD, Last Rate: 9 mL/hr at 24, 9 mL/hr at 24    lactated ringers infusion, 125 mL/hr, Intravenous, Continuous, Wayne Sanchez MD, Last Rate: 125 mL/hr at 24, 125 mL/hr at 24    lidocaine (LMX) 4 % cream 1 Application, 1 Application, Topical, PRN, Marilia Mosley MD, 1 Application at 24    lidocaine (XYLOCAINE) 1 % injection 0.5 mL, 0.5 mL, Intradermal, Once PRN, Zach Liz MD    midazolam (VERSED) injection 0.5 mg, 0.5 mg, Intravenous, Q5 Min PRN, Zach Liz MD    sodium chloride 0.9 % flush 3 mL, 3 mL, Intravenous, Q12H, Zach Liz MD    sodium chloride 0.9 % flush 3-10 mL, 3-10 mL, Intravenous, PRN, Zach Liz MD    Allergies:  Allergies   Allergen Reactions    Ciprofloxacin Other (See Comments)     UNSURE OF RX       Medical history:  Past Medical History:   Diagnosis Date    Allergic rhinitis     Anxiety     Breast cancer, right     Colon polyp 10/31/2017    , ascending colon tubular adenoma    Hemorrhoids     History of bone density study 2011    nl    History of bone density study     nl 2017    History of mitral valve prolapse     she was told this in the psat, but she had a normal echo in 2016    Hyperlipidemia     Hyperplastic colon polyp      hyperplastic rectal and colon polyps,     Pregnancy            Surgical History:  Past Surgical History:   Procedure Laterality Date     COLONOSCOPY W/ POLYPECTOMY  10/31/2017    , ascending colon 3 mm 10/31/2017, sigmoid colon (3mm) - hyperplastic polyp  2012       Family History:  Family History   Problem Relation Age of Onset    Macular degeneration Mother     Hypertension Mother     Dementia Mother     Diabetes Father     Heart disease Father     Prostate cancer Father     Heart disease Maternal Grandmother     Heart disease Paternal Grandfather     Malig Hyperthermia Neg Hx        Family Cancer History: relationship - (age of diagnosis/current age or age at death)  Breast: N/A   Ovarian: N/A  Colon: N/A  Pancreas: N/A  Prostate: N/A    Social History:   Social History     Socioeconomic History    Marital status:     Number of children: 3   Tobacco Use    Smoking status: Never    Smokeless tobacco: Never   Vaping Use    Vaping status: Never Used   Substance and Sexual Activity    Alcohol use: Not Currently     Alcohol/week: 7.0 standard drinks of alcohol     Types: 7 Glasses of wine per week     Comment: social    Drug use: No    Sexual activity: Defer       She lives with her .  She is a retired teacher.      GYNECOLOGIC HISTORY:   G: 3. P: 3. AB: 0.  Last menstrual period: in her 40's  Age at menarche: 12  Age at first childbirth: 20  Lactation/How lon month  Age at menopause: in her 40's  Total years of oral contraceptive use: 2-3 years  Total years of hormone replacement therapy: 0      Objective   PHYSICAL EXAMINATION:   Vitals:    24 0615   BP: 145/81   Pulse: 72   Resp: 16   Temp: 98.7 °F (37.1 °C)   SpO2: 98%     Examination chaperoned by Valerie.  ECOG 0 - Asymptomatic  General: NAD, well appearing  Psych: a&o x 3, normal mood and affect  Eyes: EOMI, no scleral icterus  ENMT: neck supple without masses or thyromegaly, mucus membranes moist  Resp: normal effort  CV: RRR, no edema   GI: soft, NT, ND  MSK: normal gait, normal ROM in bilateral shoulders  Lymph nodes:  no cervical, supraclavicular or axillary  lymphadenopathy  Breast: symmetric, pendulous breast, moderate volume volume. Well healed incisions from augmentation  Right: healing post biopsy hematomas to lateral breast.  No visible abnormalities on inspection while seated, with arms raised or hands on hips. No masses, skin changes, or nipple abnormalities.  Left:  No visible abnormalities on inspection while seated, with arms raised or hands on hips. No masses, skin changes, or nipple abnormalities.    Right breast, in-office ultrasound: 2 lesions in right breast and associated biopsy clips visualized along implant capsule, lateral breast       Previous IMAGING: I have independently reviewed her imagin2024, Screening MMG with Froilan ([facility]):  There are scattered areas of fibroglandular density.  There are bilateral pre-pectoral saline implants.  1. There are areas of calcification noted in both implants. There has been no significant change from the prior exam(s).  2. There are a few punctate calcifications with grouped distribution seen in the anterior one third region of the left breast at 3 o'clock.  BI-RADS 0: Incomplete     2024, Left breast Digital Diagnostic MMG with Froilan ([facility]):  Additional evaluation was performed for the calcification in the left breast, 3 o'clock seen on 2024. On the present examination, there are amorphous calcifications with grouped distribution in the anterior one third region of the left breast at 3 o'clock.  BI-RADS 4B: Suspicious     04/15/2024, Left Breast, Stereotactic Biopsy ([facility]):  1.  Breast, left, 3:00, for calcifications: Breast tissue with               -A.  Incidental atypical lobular hyperplasia               -B.  Clustered microcysts with calcifications               -C.  Columnar cell change               -D.  Cholesterol clefting, hemosiderin and fat necrosis               -E.  Clustered apocrine cysts               -F.   Florid ductal hyperplasia of the usual type               -G.   Fibroadenomatoid hyperplasia  -Tophat clip. Concordant.      6/10/2024, Bilateral Breast MRI ( [place]):  There is scattered fibroglandular tissue. There is mild background parenchymal enhancement. There are bilateral prepectoral  saline breast implants.  RIGHT BREAST:    There is mild asymmetric upper inner right breast skin thickening and edema. At 9:00 in the middle right breast, 8.5 cm posterior to the nipple, there is a 1.2 x 0.9 x 1.2 cm enhancing mass. There is a possible correlate in the outer right breast on the XCCL view from 2/28/2024, which has been marked. At 8:00 in the posterior right breast, 9 cm posterior to the nipple, there is a similar-appearing 1.0 x 0.7 x 0.8 cm enhancing mass, which is in close proximity with the underlying implant capsule. These are suspicious. The visualized axilla is within normal limits.  LEFT BREAST:    At 3:00 in the left breast, 3.4 cm posterior to the nipple, there is a focus of susceptibility from a biopsy clip marking the site of recent biopsy-proven atypical lobular hyperplasia. No suspicious enhancing mass or area of non-mass enhancement is identified at the biopsy site or elsewhere within the left breast. The visualized axilla is within normal limits.  EXTRAMAMMARY FINDINGS:  There are no pathologically enlarged internal mammary chain lymph nodes on either side.    BI-RADS 4: Suspicious     7/9/2024, Right Diagnostic MMG with Froilan & Right Breast Limited US ( [place]):  MMG:  The more anteriorly positioned 1.2 cm mass in the mid to posterior depth outer right breast is definitively seen only on the implant displaced extended CC view. The masses are otherwise likely obscured by the breast implant on the views without implant displacement. Partially imaged breast implant. No concerning microcalcification or architectural distortion.  US:  Multiplanar grayscale and color-flow imaging of the right breast was performed. Correlating with the MRI abnormality at the 9  o'clock position of the right breast 8 cm from the nipple, there is a well-circumscribed ovoid wider than tall heterogeneous probably hypoechoic mass measuring 1.2 cm x 0.9 cm x 0.5 cm superficial to the partially imaged breast implant. At the 9:30 position approximately 9 cm from the nipple, there is an additional hypoechoic mass measuring 0.6 cm x 0.6 cm x 0.5 cm, also superficial to the breast implant. No significant shadowing.  IMPRESSION:  The 2 small masses seen on the prior MRI study in the lateral right breast superficial to the breast implant are visualized on ultrasound.  Recommend ultrasound-guided right breast biopsy.  BI-RADS 4. Suspicious.    PATHOLOGY:   7/25/24 - US guided Biopsies  1. Right breast, 10:00, 9 cm from nipple, ultrasound-guided core needle biopsy for a mass (HydroMARK clip):               A. INVASIVE DUCTAL CARCINOMA, moderately differentiated; [place] histologic grade II/III       (tubule score = 3, nuclear score = 2, mitoses score = 1), measuring at least 7 mm.               B. Negative for in situ carcinoma in this sample.               C. Negative for lymphovascular space invasion.               D. See biomarker template #1 and comment.     Estrogen Receptor (ER) Status  Positive (greater than 10% of cells demonstrate nuclear positivity)   Percentage of Cells with Nuclear Positivity  %   Average Intensity of Staining  Strong   Test Type  Food and Drug Administration (FDA) cleared (test / vendor): [place]   Primary Antibody  SP1   Scoring System  Katey   Proportion Score  5   Intensity Score  3   Total Katey Score  8   Test(s) Performed     Progesterone Receptor (PgR) Status  Positive   Percentage of Cells with Nuclear Positivity  %   Average Intensity of Staining  Strong   Test Type  Food and Drug Administration (FDA) cleared (test / vendor): [place]   Primary Antibody  1E2   Scoring System  [place]   Proportion Score  5   Intensity Score  3   Total Katey Score  8    Test(s) Performed     HER2 by Immunohistochemistry  Negative (Score 1+)       2. Right breast, 930, 8 cm from nipple, ultrasound-guided core needle biopsy for a mass (HydroMARK clip):               A. INVASIVE DUCTAL CARCINOMA, moderately differentiated; [place] histologic grade II/III       (tubule score = 3, nuclear score = 2, mitoses score = 1), measuring at least 8 mm.               B. Rare focus of intermediate grade ductal carcinoma in situ (DCIS), cribriform type.               C. Negative for lymphovascular space invasion.               D. See biomarker template #2 and comment.  Estrogen Receptor (ER) Status  Positive (greater than 10% of cells demonstrate nuclear positivity)   Percentage of Cells with Nuclear Positivity  %   Average Intensity of Staining  Strong   Test Type  Food and Drug Administration (FDA) cleared (test / vendor): [place]   Primary Antibody  SP1   Scoring System  Katey   Proportion Score  5   Intensity Score  3   Total Katey Score  8   Test(s) Performed     Progesterone Receptor (PgR) Status  Positive   Percentage of Cells with Nuclear Positivity  41-50%   Average Intensity of Staining  Strong   Test Type  Food and Drug Administration (FDA) cleared (test / vendor): [place]   Primary Antibody  1E2   Scoring System  Katey   Proportion Score  4   Intensity Score  3   Total Katey Score  7   Test(s) Performed     HER2 by Immunohistochemistry  Negative (Score 1+)       Assessment & Plan   Assessment:  74 y.o. F with   right breast: Base of ductal carcinoma grade 2, ER/FL +, Her2 -; [id number], Clinical anatomic stage IA, Clinical prognostic stage IA.    Left breast ALH, incidental to calcifications, no MRI abnormal enhancement.    Discussion:  I had an extensive discussion with the patient and family about the nature of this breast cancer diagnosis. We reviewed the components of breast tissue including ducts and lobules. We reviewed her pathology report in detail. We reviewed  breast cancer histology, including stage, grade, ER/WV receptors, HER2 receptors and how this applies to her diagnosis. We discussed the multidisciplinary approach to breast cancer care.  Treatments can include surgery, radiation therapy, and medical therapy (chemotherapy, targeted therapy, and/or endocrine therapy).  The exact type of treatment and the order of therapy depends on the size and location/distribution of breast disease, the involvement of the regional lymph node basins, concern for or presence of metastatic disease, potential genetic mutations, and the individual breast cancer tumor markers expressed by the cancer. We also discussed other treatment options including the option of not undergoing any surgical treatment, with a palliative rather than curative intent and the risks associated with this including disease progression.    The patient's clinical stage is documented as above. This was discussed with the patient prior to initiation of treatment. All available pathology reports were discussed with the patient today. All treatment decisions were made via shared decision making with the patient. This patient was evaluated for appropriate ancillary referrals including, pre-treatment functional assessment, exercise program, nutrition program, genetics, and lymphedema clinic. This patient received preoperative and postoperative education. The patient was offered a breast reconstruction referral appropriate to plan surgical intervention; risks and benefits were discussed today. The patient was educated on perioperative multimodal pain management strategies. Barriers to effective and efficient care are/will be evaluated by the nurse navigator.    We discussed these options and recommendations for treatment:    Surgical Options:  We discussed the surgical management of breast cancer.  Partial mastectomy with radiation and mastectomy were explained with option of reconstruction.  The patient was informed  that from a survival standpoint, both procedures are oncologically equivalent. She is a good candidate for lumpectomy and she would like to proceed with breast conservation. We discussed that lumpectomy would require preoperative localization with a wire or a NACHO. We also discussed the risk of positive margins and that she must have negative margins for lumpectomy to be an appropriate oncologic procedure. I will make every effort to obtain negative margins at her initial operation, but there is a 10-15% chance that she will require a second operation for re-excision, or possibly a total mastectomy. We will not know the margin status until after her final pathology has returned.     We discussed that for the axilla, sentinel lymph node biopsy is recommended.  The procedure was explained in detail with the patient, including the preoperative injection of a radiotracer and intraoperative injection of lymphazurin blue dye. I explained that this is a mapping test and not a cancer test, that all of the lymph nodes containing these dyes will be removed for complete testing by pathology, and that the results could impact the decision for adjuvant treatment or additional surgery. All their questions were answered.      Medical Oncology:  We discussed that in her case, systemic treatment will likely involve endocrine therapy as targeted therapy.     We discussed that in her case, systemic treatment would involve endocrine therapy and possibly chemotherapy. She will be referred to medical oncology postoperatively to discuss this further. She is an oncotype candidate to determine if chemotherapy following surgery would help improve her overall survival and reduce the possibility of distant metastasis OR if there would be very little benefit from these therapies.       Radiation Oncology:  For all patients undergoing partial mastectomy for invasive breast cancer recommend consideration for whole breast radiation.  In some  patients pending their final surgical pathology (size of tumor and histologic subtypes) may be able to omit adjuvant radiation.  Will plan to refer following surgery for discussion with radiation oncology.    Role for Genetic Testing:  I explained that most breast cancer is not hereditary, that I do not believe this plays a role in her case, and that she does not meet NCCN criteria for genetic testing. I would not recommend a bilateral mastectomy, since her risk of a second primary cancer is relatively low and endocrine therapy will further reduce her risk.          Plan:  A multidisciplinary plan has been formulated for the patient:      # Breast Surgical Oncology:  -Right breast NACHO bracketed partial mastectomy, sentinel lymph node biopsy  -Consents completed and signed. Discussed the risks and benefits of right breast partial mastectomy, NACHO bracketed and sentinel lymph node biopsy at length including estimated time for procedure, postoperative recovery, and postoperative instructions. Discussed the risks to include pain, bleeding, infection, nerve injury, numbness, seroma, skin complications including necrosis, breast asymmetry, need for re-excision, lymphedema, inability to breast feed in the future, possible need for axillary dissection at time of surgery or at a later date, lymphocele, and scar.  Patient appears to understand and wishes to proceed.  All questions were answered  -Plastic surgery referral.  He has a personal history of bilateral saline prepack implants.  For the right breast invasion of that implant Capsule will be required for complete resection of the 2 breast masses.  -  excision of ALH with planned tissue rearrangement via lift and implant removal.   - Surgery today    # Medical Oncology:  -Is Oncotype candidate and will require 5 years of hormonal blockade  -Will refer postoperatively.    #  Radiation Oncology:  -Will refer postoperatively.    # Nurse Navigation  - Referral made  today    # Lymphedema clinic  - Referral was placed today     # Genetic Testing   -Not indicated    # Breast Imaging  - Next Screening mammogram due: February 2025.  MRI to be completed July 2025      Marilia Mosley MD  Breast Surgical Oncology

## 2024-09-12 NOTE — OP NOTE
Pre-Operative Diagnosis: Breast asymmetry following partial mastectomy, capsular contracture    Post-Operative Diagnosis: Same    Procedure Performed:   1.  Right breast oncoplastic reconstruction  2.  Left breast mastopexy for symmetry  3.  Bilateral breast total capsulectomy and implant removal    Surgeon: PAUL Sanchez MD    Assistant: Kateryna Chun PA-C    Anesthesia: General    Estimated Blood Loss: <5cc    Specimens:  Left and right breast tissue and implant capsules    Complications: None    Indications: She presented referral from Dr. Mosley after diagnosis of right breast cancer.  She also had a focus of ADH on the left breast.  Both the sites were planned for excision.  We discussed oncoplastic technique with Wise pattern skin excision.  She had old and fairly malposition implants and also discussed removal of these to prevent worsening capsular contracture after radiation.    We discussed risks, benefits and alternatives including but not limited to: bleeding, infection, asymmetry, poor or slow wound healing, need for further surgery, possible recurrence.  The patient elected to proceed.    Description of Procedure: The patient was met in the preoperative holding area.  All questions were answered and informed consent was assured.      She was marked in a standing position for left breast landmarks drawn under nipple positions marked.  Marks were confirmed with Dr. Mosley to allow adequate access to the tumor sites and preservation of the superior medial pedicle    After induction of appropriate anesthesia, a timeout was performed correctly identifying the patient, operative site, and procedure to be performed.  All present were in agreement.    After completion of the partial mastectomy skin did appear viable and pedicles preserved.  Starting on the left a 42 mm cookie cutter used to outline the nipple areolar complex and a superior medial pedicle was de-epithelialized and dissected preserving the  secondary costal .  The inferior parenchyma was preserved on inferior random pattern pedicle.  Lateral flaps were developed off of the implant capsule and essentially no additional parenchymal resection was performed from the lateral or medial breast.  The implant capsule was dissected circumferentially from the breast parenchyma and the surface of the pectoralis major muscle and passed off the field.  Some debulking of the inferior medial and lateral Crocker pattern wings was performed.  The breast was copiously irrigated and immaculate hemostasis achieved.  The inferior pedicle was anchored to the chest wall behind the breast mound.  Closure was performed with 2-0 PDS in the vertical pillar, 3-0 Monocryl in the deep dermal layer and 4-0 Monocryl in the intracuticular.    On the right a 42 mm cookie cutter used to outline the nipple areolar complex and a superior medial pedicle was de-epithelialized and dissected preserving the secondary costal .  The inferior parenchyma was preserved on inferior random pattern pedicle.  Lateral flaps were developed off of the implant capsule and essentially no additional parenchymal resection was performed from the lateral or medial breast.  The implant capsule was dissected circumferentially from the breast parenchyma and the surface of the pectoralis major muscle and passed off the field.  The lateral parenchyma was left in continuity with the inferior pedicle and rotated into the lateral defect..  The breast was copiously irrigated and immaculate hemostasis achieved.  The inferior pedicle was anchored to the chest wall behind the breast mound.  Closure was performed with 2-0 PDS in the vertical pillar, 3-0 Monocryl in the deep dermal layer and 4-0 Monocryl in the intracuticular.    The patient was then aroused from anesthesia with ease and transferred to the postoperative care area in good condition. All sponge, needle, and instrument counts were correct.

## 2024-09-12 NOTE — ANESTHESIA PREPROCEDURE EVALUATION
Anesthesia Evaluation     Patient summary reviewed and Nursing notes reviewed   NPO Solid Status: > 8 hours             Airway   Mallampati: II  TM distance: >3 FB  Neck ROM: full  no difficulty expected  Dental - normal exam     Pulmonary - normal exam   Cardiovascular - normal exam    (+) hyperlipidemia      Neuro/Psych  (+) psychiatric history Anxiety  GI/Hepatic/Renal/Endo      Musculoskeletal     Abdominal  - normal exam   Substance History      OB/GYN          Other      history of cancer                Anesthesia Plan    ASA 2     general     intravenous induction     Anesthetic plan, risks, benefits, and alternatives have been provided, discussed and informed consent has been obtained with: patient.    CODE STATUS:

## 2024-09-12 NOTE — DISCHARGE INSTRUCTIONS
Last NORCO at 2:41pm  Last Tylenol at 6:56am  Last Neurontin at 8:21am  Last Zofran at 6:56am           PATIENT IS A 70 YO MALE BIB SELF FOR ELEVATED BP HE IS AWAKE AND ALERT IN NO 
ACUTE DISTRESS.

## 2024-09-12 NOTE — ANESTHESIA PROCEDURE NOTES
Airway  Date/Time: 9/12/2024 9:25 AM    General Information and Staff    Anesthesiologist: Zach Liz MD  CRNA/CAA: Shannan Almonte CRNA    Indications and Patient Condition  Indications for airway management: airway protection    Preoxygenated: yes  MILS maintained throughout  Mask difficulty assessment: 1 - vent by mask    Final Airway Details  Final airway type: endotracheal airway      Successful airway: ETT  Cuffed: yes   Successful intubation technique: direct laryngoscopy  Facilitating devices/methods: intubating stylet  Endotracheal tube insertion site: oral  Blade: Kassandra  Blade size: 3  ETT size (mm): 7.0  Cormack-Lehane Classification: grade I - full view of glottis  Placement verified by: chest auscultation and capnometry   Measured from: lips  ETT/EBT  to lips (cm): 20  Number of attempts at approach: 1  Assessment: lips, teeth, and gum same as pre-op and atraumatic intubation

## 2024-09-12 NOTE — OP NOTE
BREAST LUMPECTOMY WITH NACHO LOCALIZATION WITH SENTINEL NODE BIOPSY  Procedure Report    Patient Name:  Verito العراقي  YOB: 1950    Date of Surgery:  9/12/2024     Indications:  left atypical hyperplasia  Right breast cancer    Pre-op Diagnosis:   Malignant neoplasm of upper-outer quadrant of right breast in female, estrogen receptor positive [C50.411, Z17.0]       Post-Op Diagnosis Codes:     * Malignant neoplasm of upper-outer quadrant of right breast in female, estrogen receptor positive [C50.411, Z17.0]        Procedure(s):  Right breast partial mastectomy, NACHO guided to 2 sites.  Right sentinel lymph node biopsy.  Left NACHO guided excisional biopsy      Staff:  Surgeon(s):  Marilia Mosley MD Wermeling, Frank Ryan, MD    Assistant: Cj Orellana CSA    Anesthesia: General    Estimated Blood Loss: minimal    Implants:    Implant Name Type Inv. Item Serial No.  Lot No. LRB No. Used Action   CLIPAPPLR M/ ENDO LIGACLIP9 3/8IN MD - [id number] Implant CLIPAPPLR M/ ENDO LIGACLIP9 3/8IN MD  [employer]  DIV OF J AND J [id number]  1 Implanted   DEV CONTRL TISS STRATAFIXSPIRALMNCRYL PLSPS2 REV3/0 45CM - [id number] Implant DEV CONTRL TISS STRATAFIXSPIRALMNCRYL PLSPS2 REV3/0 45CM  [employer]  DIV OF J AND J [id number]  1 Implanted   DEV CONTRL TISS STRATAFIXSPIRALMNCRYL PLSPS2 REV3/0 45CM - [id number] Implant DEV CONTRL TISS STRATAFIXSPIRALMNCRYL PLSPS2 REV3/0 45CM  [employer]  DIV OF J AND J [id number]  1 Implanted       Specimen:          Specimens       ID Source Type Tests Collected By Collected At Frozen?    A Breast, Left Tissue TISSUE PATHOLOGY EXAM   Marilia Mosley MD 9/12/24 0955 No    Description: Left breast excisional biopsy, short stitch superior, long stitch lateral, 1g    B Breast, Right Tissue TISSUE PATHOLOGY EXAM   Marilia Mosley MD 9/12/24 1011 No    Description: Right breast superior margin ink at true margin    C Breast, Right Tissue TISSUE PATHOLOGY EXAM   Marilia Mosley MD  9/12/24 1017 No    Description: Right breast lateral margin ink at true margin    D Breast, Right Tissue TISSUE PATHOLOGY EXAM   Marilia Mosley MD 9/12/24 1024 No    Description: Right breast inferior margin ink at true margin    E Axilla, Right Tissue TISSUE PATHOLOGY EXAM   Marilia Mosley MD 9/12/24 1014 No    Description: Right axillary lymph node    F Breast, Right Tissue TISSUE PATHOLOGY EXAM   Marilia Mosley MD 9/12/24 1025 No    Description: Right breast posterior margin ink at true margin    G Breast, Right Tissue TISSUE PATHOLOGY EXAM   Marilia Mosley MD 9/12/24 1025 No    Description: Right breast anterior margin ink at true margin    H Breast, Left Tissue TISSUE PATHOLOGY EXAM   Wayne Sanchez MD 9/12/24 1115 No    Description: Left breast tissue 35G    This specimen was not marked as sent.    J Breast, Right Tissue TISSUE PATHOLOGY EXAM   Marilia Mosley MD 9/12/24 1042 No    Description: Right breast partial mastectomy. Ink marks true margins.    Comment: Right breast partial mastectomy. Ink marks true margins.                 Findings: huma and clips within resected breast tissue    Complications: none    Description of Procedure:   The risks and benefits of the procedure were explained in detail to the patient.  Informed consent was obtained.  Prior to arrival, the patient had a radiographically localizing SAVIs were placed for proper identification of the breast lesion in question- two into the right breast for her breast cancer sites and one in the left breast for atypical lobular hyperplasia.  Radiotracer was injected prior to the start of the procedure in her right breast as well to identify sentinel lymph nodes.     The patient was then taken to the operating room and placed supine on the operating room table.  Sequential compression devices were applied to the lower extremities and preoperative antibiotics administered prior to the start of the case. After the administration of adequate  "anesthesia, the entire chest was prepped and draped in the standard surgical fashion. A time out was then performed to identify the proper patient, procedure, and location.     An incision was then made on the left breast based on the mammographic images and the positioning of the localizing NACHO along the incisions marked by Dr. Sanchez.  Dissection was carried through the skin to the underlying subcutaneous tissues.  Sharp dissection with the use of electrocautery was utilized to dissect down to the level of the clip.  The region was dissected free from the rest of the breast and removed, care taken to ensure that the NACHO was intact and the lesion in question was positioned near the center of the resected specimen. The specimen was then marked short stitch superior and long stitch lateral.     Next 4 mL of lymphazurin blue dye was injected into the periareolar space of the right breast and massaged for 5 minutes.   ttention was drawn to the right axilla.  A 4 cm incision was made at the base of the hair bearing region and dissection carried into the deep axillary space.  Utilizing the gamma probe, \"hot\" lymph node(s) with increased radioactivity were identified. All blue nodes, non-colored nodes at the end of a blue channel, hot nodes and palpable nodes were removed as sentinel lymph nodes. In total, 1 deep sentinel lymph node was removed.    An incision was then made on the right breast based on the mammographic images and the positioning of the localizing SAVIs along the incisions marked by Dr. Sanchez. Dissection was carried through the skin to the underlying subcutaneous tissues.  Sharp dissection with the use of electrocautery was utilized to dissect down to the level of the two clips and savis.  The region was dissected free from the rest of the breast and removed with inclusion of the implant capsule as the specimen's medial border, care taken to ensure that the SAVIs were intact. The two biopsied " lesions were resected enbloc for pathologic evaluation. The specimen was then marked by ink: Green - Anterior, Blue - Inferior, Orange - lateral, Yellow - medial, Black - posterior, Red - Superior.  Intraoperative specimen radiograph confirmed the lesion and the previously placed biopsy clip were within the specimen.  Additional margins were then obtained and clips placed to ellen the resection cavity. Ink marks true margin for pathology evaluation.  The posterior margin was taken down to pectoral fascia. Hemostasis was ensured.     The patient was then handed over to Dr Sanchez for oncoplastic closure and lift for symmetry with planned explant of her saline implants.           [place] Node Biopsy for Breast Cancer - Right  Operation performed with curative intent. Yes   Tracer(s) used to identify sentinel nodes in the upfront surgery (non-neoadjuvant) setting (select all that apply). Dye and Radioactive tracer   Tracer(s) used to identify sentinel nodes in the neoadjuvant setting (select all that apply). N/A   All nodes (colored or non-colored) present at the end of a dye-filled lymphatic channel were removed. Yes   All significantly radioactive nodes were removed. Yes   All palpably suspicious nodes were removed. Yes   Biopsy-proven positive nodes marked with clips prior to chemotherapy were identified and removed. N/A            Assistant: Cj Orellana CSA  was responsible for performing the following activities: Retraction, Suction, Irrigation, and Closing and their skilled assistance was necessary for the success of this case.    Marilia Mosley MD     Date: 9/12/2024  Time: 11:20 EDT

## 2024-09-17 ENCOUNTER — TELEPHONE (OUTPATIENT)
Dept: SURGERY | Facility: CLINIC | Age: 74
End: 2024-09-17
Payer: MEDICARE

## 2024-09-17 DIAGNOSIS — C50.411 MALIGNANT NEOPLASM OF UPPER-OUTER QUADRANT OF RIGHT BREAST IN FEMALE, ESTROGEN RECEPTOR POSITIVE: Primary | ICD-10-CM

## 2024-09-17 DIAGNOSIS — Z17.0 MALIGNANT NEOPLASM OF UPPER-OUTER QUADRANT OF RIGHT BREAST IN FEMALE, ESTROGEN RECEPTOR POSITIVE: Primary | ICD-10-CM

## 2024-09-18 ENCOUNTER — TELEPHONE (OUTPATIENT)
Dept: SURGERY | Facility: CLINIC | Age: 74
End: 2024-09-18
Payer: MEDICARE

## 2024-09-18 LAB
CYTO UR: NORMAL
CYTO UR: NORMAL
LAB AP CASE REPORT: NORMAL
LAB AP CASE REPORT: NORMAL
LAB AP DIAGNOSIS COMMENT: NORMAL
LAB AP DIAGNOSIS COMMENT: NORMAL
LAB AP SPECIAL STAINS: NORMAL
LAB AP SPECIAL STAINS: NORMAL
LAB AP SYNOPTIC CHECKLIST: NORMAL
LAB AP SYNOPTIC CHECKLIST: NORMAL
PATH REPORT.ADDENDUM SPEC: NORMAL
PATH REPORT.ADDENDUM SPEC: NORMAL
PATH REPORT.FINAL DX SPEC: NORMAL
PATH REPORT.FINAL DX SPEC: NORMAL
PATH REPORT.GROSS SPEC: NORMAL
PATH REPORT.GROSS SPEC: NORMAL

## 2024-09-23 ENCOUNTER — TELEPHONE (OUTPATIENT)
Dept: SURGERY | Facility: CLINIC | Age: 74
End: 2024-09-23
Payer: MEDICARE

## 2024-09-25 ENCOUNTER — OFFICE VISIT (OUTPATIENT)
Dept: SURGERY | Facility: CLINIC | Age: 74
End: 2024-09-25
Payer: MEDICARE

## 2024-09-25 VITALS
WEIGHT: 182 LBS | RESPIRATION RATE: 18 BRPM | DIASTOLIC BLOOD PRESSURE: 78 MMHG | HEART RATE: 96 BPM | SYSTOLIC BLOOD PRESSURE: 126 MMHG | HEIGHT: 62 IN | OXYGEN SATURATION: 100 % | BODY MASS INDEX: 33.49 KG/M2

## 2024-09-25 DIAGNOSIS — C50.411 MALIGNANT NEOPLASM OF UPPER-OUTER QUADRANT OF RIGHT BREAST IN FEMALE, ESTROGEN RECEPTOR POSITIVE: Primary | ICD-10-CM

## 2024-09-25 DIAGNOSIS — Z17.0 MALIGNANT NEOPLASM OF UPPER-OUTER QUADRANT OF RIGHT BREAST IN FEMALE, ESTROGEN RECEPTOR POSITIVE: Primary | ICD-10-CM

## 2024-09-25 DIAGNOSIS — Z12.31 BREAST CANCER SCREENING BY MAMMOGRAM: ICD-10-CM

## 2024-09-25 PROCEDURE — 1159F MED LIST DOCD IN RCRD: CPT | Performed by: STUDENT IN AN ORGANIZED HEALTH CARE EDUCATION/TRAINING PROGRAM

## 2024-09-25 PROCEDURE — 99024 POSTOP FOLLOW-UP VISIT: CPT | Performed by: STUDENT IN AN ORGANIZED HEALTH CARE EDUCATION/TRAINING PROGRAM

## 2024-09-25 PROCEDURE — 1160F RVW MEDS BY RX/DR IN RCRD: CPT | Performed by: STUDENT IN AN ORGANIZED HEALTH CARE EDUCATION/TRAINING PROGRAM

## 2024-10-01 ENCOUNTER — PATIENT OUTREACH (OUTPATIENT)
Dept: OTHER | Facility: HOSPITAL | Age: 74
End: 2024-10-01
Payer: MEDICARE

## 2024-10-01 ENCOUNTER — TELEPHONE (OUTPATIENT)
Dept: RADIATION ONCOLOGY | Facility: HOSPITAL | Age: 74
End: 2024-10-01
Payer: MEDICARE

## 2024-10-01 NOTE — PROGRESS NOTES
Referral received from Dr. Mosley's office. I called Ms. العراقي and introduced myself and navigational services. She has a new diagnosis of right breast: invasive ductal carcinoma grade 2, ER/[place] +, Her2 -; [id number], Clinical anatomic stage IA. Left breast ALH, incidental to calcifications, no MRI abnormal enhancement. She had a Right breast partial mastectomy, NACHO guided to 2 sites.  Right sentinel lymph node biopsy.  Left NACHO guided excisional biopsy     She stated the consult with Dr. Mosley went well and she is recovering from her surgery well.  She has a good understanding of her pathology and treatment options. She was able to verbalize teach back on her plan of care.      She stated she has a wonderful support system with her . She stated she feels comfortable talking to them about needs or issues.      She stated she has no financial or transportation concerns at this time. She has no resource needs or ongoing concerns at this time.      She stated she is doing well emotionally at this time. We discussed we have support options if the need arises. She was thankful for the information.      We discussed integrative therapies and other services at [facility]. She will received a navigation folder with the following information in the mail:     [facility], Cancer and Restorative Exercise (CARE), [employer] Exercise program, Guide for the Newly Diagnosed, [facility], [facility], Massage Therapy, Reiki Therapy, [employer] [place], Cancer Nutrition, and Survivorship Clinic.     She verbalized appreciation for navigational services and she has my contact information and will call with any questions that arise.

## 2024-10-02 ENCOUNTER — OFFICE VISIT (OUTPATIENT)
Dept: RADIATION ONCOLOGY | Facility: HOSPITAL | Age: 74
End: 2024-10-02
Payer: MEDICARE

## 2024-10-02 VITALS
WEIGHT: 177 LBS | DIASTOLIC BLOOD PRESSURE: 81 MMHG | BODY MASS INDEX: 32.12 KG/M2 | SYSTOLIC BLOOD PRESSURE: 141 MMHG | OXYGEN SATURATION: 97 % | HEART RATE: 80 BPM

## 2024-10-02 DIAGNOSIS — C50.411 MALIGNANT NEOPLASM OF UPPER-OUTER QUADRANT OF RIGHT BREAST IN FEMALE, ESTROGEN RECEPTOR POSITIVE: Primary | ICD-10-CM

## 2024-10-02 DIAGNOSIS — Z17.0 MALIGNANT NEOPLASM OF UPPER-OUTER QUADRANT OF RIGHT BREAST IN FEMALE, ESTROGEN RECEPTOR POSITIVE: Primary | ICD-10-CM

## 2024-10-02 PROCEDURE — G0463 HOSPITAL OUTPT CLINIC VISIT: HCPCS | Performed by: RADIOLOGY

## 2024-10-02 NOTE — PROGRESS NOTES
Cancer Staging   Stage IA (cT1c, cN0, cM0, G2, ER+, AR+, HER2-)                               Stage IA (cT1c, cN0, cM0, G2, ER+, AR+, HER2-)      CC: right breast cancer, evaluate for radiation                              Dear Marilia Mosley MD     I had the pleasure of seeing Verito العراقي  today in the [facility].   The patient is a 74 y.o. female with recently diagnosed right breast cancer.  She had a screening mammogram on 02/28/2024 which showed bilateral pre-pectoral saline implants, a few punctate calcifications with grouped distribution seen in the anterior one third region of the left breast at 3 o'clock.BI-RADS 0: Incomplete.  She had a left breast diagnostic mammogram on 03/20/2024 which showed amorphous calcifications with grouped distribution in the anterior one third region of the left breast at 3 o'clock.BI-RADS 4B: Suspicious.     She had a stereotactic biopsy of the left breast 3 oclock calcs on 4/15/24 which showed Incidental atypical lobular hyperplasia.     She had a breast mri on 6/10/24 which showed No suspicious enhancement is identified at 3:00 in the left breastat a site of biopsy-proven atypical lobular hyperplasia, which is markedwith a top hat clip. Consider surgical management.2.  Suspicious 1.2 cm enhancing mass at 9:00 with a possiblemammographic correlate and suspicious 1 cm enhancing mass at 8:00 in theright breast. Recommend further evaluation with diagnostic mammogram andtargeted ultrasound, followed by possible stereotactic and/or ultrasoundguided core needle biopsies.     She had a right breast diagnostic mammogram and ultrasound on 7/9/24 which showed the more anteriorly positioned 1.2 cm mass in the mid to posterior depth outer right breast is definitively seen only on the implant displaced extended CC view. The masses are otherwise likely obscured by the breast implant on the views without implant displacement. Partially imaged breast implant. No concerning  microcalcification or architectural distortion. Ultrasound showed at the 9 o'clock position of the right breast 8 cm from the nipple, there is a well-circumscribed ovoid wider than tall heterogeneous probably hypoechoic mass measuring 1.2 cm x 0.9 cm x 0.5 cm superficial to the partially imaged breast implant. At the 9:30 position approximately 9 cm from the nipple, there is an additional hypoechoic mass measuring 0.6 cm x 0.6 cm x 0.5 cm, also superficial to the breast implant. No significant shadowing.IMPRESSION: The 2 small masses seen on the prior MRI study in the lateral right breast superficial to the breast implant are visualized on ultrasound. Recommend ultrasound-guided right breast biopsy.BI-RADS 4. Suspicious.     She underwent a biopsy of the right breast 10 colock position on 7/25/24 which revealed grade ii invasive ductal carcinoma, 7mm no dcis, no lvsi.  Biopsy of right breast 9:30 position revealed invasive ductal carcinoma grade II, 8mm, rare focus of DCIS cribriform type, no lvsi ER % [place] % Her  2negative ki67 15%.  The second tumor was ER % [place] 41-50$ Her 2 neg ki67 40%.       She is  G: 3. P: 3. AB: 0 menarche age 12 and first childbirth age 20.  She breast fed for one months.  She went through menopause in her 40s.  She used oral contraceptives for a few years and has never used hormone replacement therapy.     Interval hx 10/2/24:  She underwent a right breast lumpectomy and sentinel node biopsy on 9/12/24 with pathology revealing 11mm invasive ductal carcinoma, grade II, with low grade DCIS 5mm, as well as a second site of invasive ductal carcinoma measuirng 12mm, grade II.  One sentinel node was negative for metastatic disease. The closest medial margin was 4.5mm. her pathologic stage was [id number].     She also had left breast excisional biopsy with pathology revealing radial scar, focal fibroadenomatous hyperplasia/fibroadenoma, fibrocystic change with clustered ductal and  apocrine cysts, intact intraductal papilloma and micro-papilloma and duct ectasia.    She is recovering well from her surgery and returns today for re-evaluation and finalization of her treatment plan.       Review of Systems   Constitutional: Negative.    Respiratory: Negative.     Cardiovascular: Negative.    Musculoskeletal: Negative.    Neurological: Negative.    Psychiatric/Behavioral: Negative.           Past Medical History:   Diagnosis Date    Allergic rhinitis     Anxiety     Breast cancer, right     Colon polyp 10/31/2017    , ascending colon tubular adenoma    Hemorrhoids     History of bone density study 2011    nl    History of bone density study     nl 2017    History of mitral valve prolapse     she was told this in the psat, but she had a normal echo in 2016    Hyperlipidemia     Hyperplastic colon polyp      hyperplastic rectal and colon polyps,     Pregnancy              Past Surgical History:   Procedure Laterality Date    BREAST LUMPECTOMY WITH SENTINEL NODE BIOPSY Bilateral 2024    Procedure: Right breast partial mastectomy, NACHO guided to 2 sites.  Right sentinel lymph node biopsy.  Left NACHO guided excisional biopsy;  Surgeon: Marilia Mosley MD;  Location: [facility];  Service: General;  Laterality: Bilateral;    BREAST SURGERY Bilateral 2024    Procedure: RIGHT ONCOPLASTIC AND LEFT MASTOPEXY FOR SYMMETRY, BILATERAL REMOVAL OF BREAST IMPLANTS;  Surgeon: Wayne Sanchez MD;  Location: [facility];  Service: Plastics;  Laterality: Bilateral;    COLONOSCOPY W/ POLYPECTOMY  10/31/2017    , ascending colon 3 mm 10/31/2017, sigmoid colon (3mm) - hyperplastic polyp  2012         Social History     Socioeconomic History    Marital status:     Number of children: 3   Tobacco Use    Smoking status: Never    Smokeless tobacco: Never   Vaping Use    Vaping status: Never Used   Substance and Sexual Activity    Alcohol use: Not  Currently     Alcohol/week: 7.0 standard drinks of alcohol     Types: 7 Glasses of wine per week     Comment: social    Drug use: No    Sexual activity: Defer         Family History   Problem Relation Age of Onset    Macular degeneration Mother     Hypertension Mother     Dementia Mother     Diabetes Father     Heart disease Father     Prostate cancer Father     Heart disease Maternal Grandmother     Heart disease Paternal Grandfather     Malig Hyperthermia Neg Hx           Objective    Physical Exam  Constitutional:       Appearance: Normal appearance.   HENT:      Head: Atraumatic.   Eyes:      Extraocular Movements: Extraocular movements intact.   Chest:          Comments: Incision well healed, no palpable masses or adenopathy  Neurological:      General: No focal deficit present.      Mental Status: She is alert.   Psychiatric:         Mood and Affect: Mood normal.         Behavior: Behavior normal.         Current Outpatient Medications on File Prior to Visit   Medication Sig Dispense Refill    acetaminophen (TYLENOL) 325 MG tablet Take 2 tablets by mouth Every 4 (Four) Hours As Needed for Mild Pain. 60 tablet 0    atorvastatin (LIPITOR) 20 MG tablet TAKE 1 TABLET DAILY 90 tablet 3    busPIRone (BUSPAR) 5 MG tablet TAKE 1 TABLET THREE TIMES A  tablet 3    Cetirizine HCl (ZYRTEC PO) Take 10 mg by mouth Daily.      docusate sodium (COLACE) 250 MG capsule Take 1 capsule by mouth Daily As Needed for Constipation. 30 capsule 1    fluticasone (FLONASE) 50 MCG/ACT nasal spray 2 sprays by Each Nare route Daily. (Patient taking differently: 1 spray by Each Nare route Daily.) 16 mL 11    gabapentin (Neurontin) 100 MG capsule Take 1 capsule by mouth Every 8 (Eight) Hours. 60 capsule 1    HYDROcodone-acetaminophen (NORCO) 5-325 MG per tablet Take 1 tablet by mouth Every 4 (Four) Hours As Needed (Pain). 20 tablet 0    ondansetron ODT (ZOFRAN-ODT) 8 MG disintegrating tablet Place 1 tablet on the tongue Every 8 (Eight)  Hours As Needed for Nausea or Vomiting. 10 tablet 1    sennosides-docusate (PERICOLACE) 8.6-50 MG per tablet Take 2 tablets by mouth Daily As Needed for Constipation. 30 tablet 1     No current facility-administered medications on file prior to visit.       ALLERGIES:    Allergies   Allergen Reactions    Ciprofloxacin Other (See Comments)     UNSURE OF RX       There were no vitals taken for this visit.          No data to display                  Assessment & Plan   74 year old female with [id number] right breast cancer, ER DE Positive Her 2 negative, [id number] s/p lumpectomy with 2 foci of invasive lobular carcinoma. I again discussed with her the role for post-operative radiation therapy to the right breast to decrease the risk of local recurrence.    Given the multifocal nature of her disease and both tumors over 1cm, I recommend adjuvant radiation.      I discussed with her in detail the risks, benefits and rationale of radiation therapy to the right breast to include but not limited to the following:     Acute: skin erythema, breakdown/moist desquamation, swelling or discomfort of the breast, fatigue, pneumonitis resulting in shortness of breath, cough or pain, damage to the implant or infection requiring removal     Late: Permanent skin changes including hyperpigmentation, telangiectasias, fibrosis of the breast resulting in smaller size or poor cosmetic outcome, capsular contracture of implant requiring removal, late edema or cellulitis, late rib fracture, late pulmonary fibrosis and the remote risk of second malignancies.      She voiced understanding.  She has a trip to [place] planned in November and would like to wait to start radiation after she returns.  I gave her an appointment for follow up in late November. I plan to deliver a dose of [id number] in 15 fractions with no boost.     I personally spent greater than 45 minutes today assessing, managing, discussing and documenting my visit with the patient.  That time includes review of records, imaging and pathology reports, obtaining my own history, performing a medically appropriate evaluation, counseling and educating the patient, discussing goals, logistics, alternatives and risks of my recommendations, surveillance options and potential outcomes. It also includes the time documenting the clinical information in the EMR and communicating my recommendations to the other involved physicians.               Thank you very much for allowing me to participate in the care of this very pleasant patient.    Sincerely,      Gabriella Alvarado MD

## 2024-10-15 ENCOUNTER — LAB (OUTPATIENT)
Dept: LAB | Facility: HOSPITAL | Age: 74
End: 2024-10-15
Payer: MEDICARE

## 2024-10-15 ENCOUNTER — CONSULT (OUTPATIENT)
Dept: ONCOLOGY | Facility: CLINIC | Age: 74
End: 2024-10-15
Payer: MEDICARE

## 2024-10-15 VITALS
HEART RATE: 80 BPM | RESPIRATION RATE: 16 BRPM | DIASTOLIC BLOOD PRESSURE: 84 MMHG | SYSTOLIC BLOOD PRESSURE: 146 MMHG | BODY MASS INDEX: 32.41 KG/M2 | OXYGEN SATURATION: 97 % | HEIGHT: 62 IN | TEMPERATURE: 97.7 F | WEIGHT: 176.1 LBS

## 2024-10-15 DIAGNOSIS — Z78.0 POSTMENOPAUSAL: ICD-10-CM

## 2024-10-15 DIAGNOSIS — Z00.00 HEALTH CARE MAINTENANCE: Primary | ICD-10-CM

## 2024-10-15 DIAGNOSIS — R79.89 ABNORMAL CBC: Primary | ICD-10-CM

## 2024-10-15 LAB
BASOPHILS # BLD AUTO: 0.09 10*3/MM3 (ref 0–0.2)
BASOPHILS NFR BLD AUTO: 1.1 % (ref 0–1.5)
DEPRECATED RDW RBC AUTO: 44 FL (ref 37–54)
EOSINOPHIL # BLD AUTO: 0.19 10*3/MM3 (ref 0–0.4)
EOSINOPHIL NFR BLD AUTO: 2.4 % (ref 0.3–6.2)
ERYTHROCYTE [DISTWIDTH] IN BLOOD BY AUTOMATED COUNT: 13.1 % (ref 12.3–15.4)
HCT VFR BLD AUTO: 45.6 % (ref 34–46.6)
HGB BLD-MCNC: 14.6 G/DL (ref 12–15.9)
IMM GRANULOCYTES # BLD AUTO: 0.09 10*3/MM3 (ref 0–0.05)
IMM GRANULOCYTES NFR BLD AUTO: 1.1 % (ref 0–0.5)
LYMPHOCYTES # BLD AUTO: 2.64 10*3/MM3 (ref 0.7–3.1)
LYMPHOCYTES NFR BLD AUTO: 33.5 % (ref 19.6–45.3)
MCH RBC QN AUTO: 29.5 PG (ref 26.6–33)
MCHC RBC AUTO-ENTMCNC: 32 G/DL (ref 31.5–35.7)
MCV RBC AUTO: 92.1 FL (ref 79–97)
MONOCYTES # BLD AUTO: 0.68 10*3/MM3 (ref 0.1–0.9)
MONOCYTES NFR BLD AUTO: 8.6 % (ref 5–12)
NEUTROPHILS NFR BLD AUTO: 4.19 10*3/MM3 (ref 1.7–7)
NEUTROPHILS NFR BLD AUTO: 53.3 % (ref 42.7–76)
NRBC BLD AUTO-RTO: 0.3 /100 WBC (ref 0–0.2)
PLATELET # BLD AUTO: 250 10*3/MM3 (ref 140–450)
PMV BLD AUTO: 9.6 FL (ref 6–12)
RBC # BLD AUTO: 4.95 10*6/MM3 (ref 3.77–5.28)
WBC NRBC COR # BLD AUTO: 7.88 10*3/MM3 (ref 3.4–10.8)

## 2024-10-15 PROCEDURE — 85025 COMPLETE CBC W/AUTO DIFF WBC: CPT

## 2024-10-15 PROCEDURE — 36415 COLL VENOUS BLD VENIPUNCTURE: CPT

## 2024-10-15 RX ORDER — ANASTROZOLE 1 MG/1
1 TABLET ORAL DAILY
Qty: 30 TABLET | Refills: 3 | Status: SHIPPED | OUTPATIENT
Start: 2024-10-15

## 2024-10-15 NOTE — PROGRESS NOTES
Subjective   Verito العراقي is a 74 y.o. female.  Referred by Dr. Mosley for right breast invasive ductal carcinoma    History of Present Illness     Ms. 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  Patient had bilateral prepectoral saline implants and a few punctate calcifications seen in the anterior one third of the left breast.  3 o'clock position.    Left breast diagnostic mammogram 3/20/2024-amorphus calcifications with grouped distribution in the anterior one third region of the left breast at 3:00.    4/15/2024-left breast 3:00 stereotactic biopsy-atypical lobular hyperplasia    6/10/2024-bilateral breast MRI-no suspicious enhancement in the left breast at the 2 o'clock position at the site of atypical lobular hyperplasia.  Suspicious 1.2 cm enhancing mass at 9 o'clock position of the right breast with a possible mammographic correlate and suspicious 1 cm enhancing mass at 8:00 of the right breast.  Further evaluation with diagnostic mammogram and ultrasound and possible stereotactic or ultrasound-guided biopsy recommended.    Right breast diagnostic mammogram and ultrasound 7/9/2024-More anterior 1.2 cm mass in the posterior depth of the outer right breast definitely seen on the implant displaced extended cc view.  Masses are otherwise likely obscured by the breast implant.  Ultrasound showed a 9 o'clock position right breast mass, 8 cm from the nipple measuring 1.2 x 0.9 x 3.5 cm.  At the 9:30 position, 9 cm from the nipple there is a 0.6 x 0.6 time 0.5 cm mass.  Impression-2 small masses seen on the prior MRI study in the lateral right breast superficial to the breast implant are visualized on the ultrasound.  Ultrasound-guided biopsy recommended.    7/25/2024-right breast 10:00 ultrasound-guided biopsy-invasive ductal carcinoma, grade 2 7 mm, no DCIS or lymphovascular space invasion.  ER +91 to 100%, OK +41 to  50%, HER2 negative, Ki-67 40%    Right breast 9:30 position ultrasound-guided biopsy-invasive ductal carcinoma, grade 2, 8 mm, rare focus of DCIS, no lymphovascular space invasion  ER +91 to 100%, VT +91 to 100%, HER2 negative, Ki-67 15%    9/12/2024-left breast excisional biopsy and right breast lumpectomy and right sentinel lymph node biopsy  1.left breast excisional biopsy-radial scar, focal fibroadenomatous hyperplasia, fibrocystic change with clustered ductal and apocrine cyst.  No evidence of malignancy.    2.right breast partial mastectomy-multifocal invasive ductal carcinoma  1A-invasive ductal carcinoma measuring 11 x 10 x 5 mm, grade 2, no lymphovascular space invasion, focal associated low-grade DCIS    2A-invasive ductal carcinoma measuring 12 x 11 x 10 mm, grade 2  No lymphovascular space invasion, no definite associated DCIS    The tumors are ER/VT strongly positive and HER2 1+ on immunohistochemistry.    Oncotype DX recurrence score on the 12 mm tumor returned at 12 with a less than 1% benefit from chemotherapy and 3% risk of distant metastasis at 9 years.    Patient denies any family history of breast cancer.    Comorbidities include hyperlipidemia and anxiety.    Patient here to discuss adjuvant therapy.  She expresses that she is not interested in receiving any chemotherapy.  She feels somewhat anxious with the number of appointments that she has had since the diagnosis of breast cancer.    Patient is recovering well from surgery.    The following portions of the patient's history were reviewed and updated as appropriate: allergies, current medications, past family history, past medical history, past social history, past surgical history, and problem list.    Past Medical History:   Diagnosis Date    Allergic rhinitis     Anxiety     Breast cancer, right     Colon polyp 10/31/2017    , ascending colon tubular adenoma    Hemorrhoids     History of bone density study 08/2011    nl    History of  bone density study     nl 2017    History of mitral valve prolapse     she was told this in the psat, but she had a normal echo in 2016    Hyperlipidemia     Hyperplastic colon polyp      hyperplastic rectal and colon polyps,     Pregnancy             Past Surgical History:   Procedure Laterality Date    BREAST LUMPECTOMY WITH SENTINEL NODE BIOPSY Bilateral 2024    Procedure: Right breast partial mastectomy, NACHO guided to 2 sites.  Right sentinel lymph node biopsy.  Left NACHO guided excisional biopsy;  Surgeon: Marilia Mosley MD;  Location: [facility];  Service: General;  Laterality: Bilateral;    BREAST SURGERY Bilateral 2024    Procedure: RIGHT ONCOPLASTIC AND LEFT MASTOPEXY FOR SYMMETRY, BILATERAL REMOVAL OF BREAST IMPLANTS;  Surgeon: Wayne Sanchez MD;  Location: [facility];  Service: Plastics;  Laterality: Bilateral;    COLONOSCOPY W/ POLYPECTOMY  10/31/2017    , ascending colon 3 mm 10/31/2017, sigmoid colon (3mm) - hyperplastic polyp  2012        Family History   Problem Relation Age of Onset    Macular degeneration Mother     Hypertension Mother     Dementia Mother     Diabetes Father     Heart disease Father     Prostate cancer Father     Heart disease Maternal Grandmother     Heart disease Paternal Grandfather     Malig Hyperthermia Neg Hx         Social History     Socioeconomic History    Marital status:     Number of children: 3   Tobacco Use    Smoking status: Never    Smokeless tobacco: Never   Vaping Use    Vaping status: Never Used   Substance and Sexual Activity    Alcohol use: Not Currently     Alcohol/week: 7.0 standard drinks of alcohol     Types: 7 Glasses of wine per week     Comment: social    Drug use: No    Sexual activity: Defer        OB History    No obstetric history on file.     Age at menarche-12  Age at first live childbirth-20   3 para 3  0  Breast-feeding-1 month  Age at menopause-40s  No use of  hormone replacement therapy.    Allergies   Allergen Reactions    Ciprofloxacin Other (See Comments)     UNSURE OF RX            Review of Systems   Constitutional: Negative.    HENT: Negative.     Eyes: Negative.    Respiratory: Negative.     Cardiovascular: Negative.    Gastrointestinal: Negative.    Endocrine: Negative.    Genitourinary:  Positive for amenorrhea.   Neurological: Negative.    Psychiatric/Behavioral:  The patient is nervous/anxious.          Objective   There were no vitals taken for this visit.   Physical Exam  Vitals reviewed.   Constitutional:       Appearance: Normal appearance. She is normal weight.   HENT:      Mouth/Throat:      Pharynx: Oropharynx is clear.   Eyes:      Conjunctiva/sclera: Conjunctivae normal.   Cardiovascular:      Rate and Rhythm: Normal rate.   Pulmonary:      Effort: Pulmonary effort is normal.   Musculoskeletal:         General: Normal range of motion.      Cervical back: Normal range of motion.   Neurological:      General: No focal deficit present.      Mental Status: She is alert.   Psychiatric:         Mood and Affect: Mood normal.         Behavior: Behavior normal.         Thought Content: Thought content normal.         Judgment: Judgment normal.         Lab on 10/15/2024   Component Date Value Ref Range Status    WBC 10/15/2024 7.88  3.40 - 10.80 10*3/mm3 Final    RBC 10/15/2024 4.95  3.77 - 5.28 10*6/mm3 Final    Hemoglobin 10/15/2024 14.6  12.0 - 15.9 g/dL Final    Hematocrit 10/15/2024 45.6  34.0 - 46.6 % Final    MCV 10/15/2024 92.1  79.0 - 97.0 fL Final    MCH 10/15/2024 29.5  26.6 - 33.0 pg Final    MCHC 10/15/2024 32.0  31.5 - 35.7 g/dL Final    RDW 10/15/2024 13.1  12.3 - 15.4 % Final    RDW-SD 10/15/2024 44.0  37.0 - 54.0 fl Final    MPV 10/15/2024 9.6  6.0 - 12.0 fL Final    Platelets 10/15/2024 250  140 - 450 10*3/mm3 Final    Neutrophil % 10/15/2024 53.3  42.7 - 76.0 % Final    Lymphocyte % 10/15/2024 33.5  19.6 - 45.3 % Final    Monocyte %  10/15/2024 8.6  5.0 - 12.0 % Final    Eosinophil % 10/15/2024 2.4  0.3 - 6.2 % Final    Basophil % 10/15/2024 1.1  0.0 - 1.5 % Final    Immature Grans % 10/15/2024 1.1 (H)  0.0 - 0.5 % Final    Neutrophils, Absolute 10/15/2024 4.19  1.70 - 7.00 10*3/mm3 Final    Lymphocytes, Absolute 10/15/2024 2.64  0.70 - 3.10 10*3/mm3 Final    Monocytes, Absolute 10/15/2024 0.68  0.10 - 0.90 10*3/mm3 Final    Eosinophils, Absolute 10/15/2024 0.19  0.00 - 0.40 10*3/mm3 Final    Basophils, Absolute 10/15/2024 0.09  0.00 - 0.20 10*3/mm3 Final    Immature Grans, Absolute 10/15/2024 0.09 (H)  0.00 - 0.05 10*3/mm3 Final    nRBC 10/15/2024 0.3 (H)  0.0 - 0.2 /100 WBC Final        No radiology results for the last 30 days.       Assessment & Plan       *Right breast invasive ductal carcinoma  2 foci of invasive ductal carcinoma, 1 focus measures 12 mm in second focus measures 11 mm, both are grade 2, ER/KY strongly positive  Status post right breast partial mastectomy on 9/12/2024  Oncotype DX recurrence score of the 12 mm tumor returned 12 with no additional benefit from chemotherapy and 3% risk of distant metastasis.  Patient has met with Dr. Gabriella Hartman and plans to initiate radiation in December.  She is going on trip to [place] in November and hence wants to wait on the radiation till she returns.  We discussed the fact that there are 2 tumors and we only have Oncotype DX recurrence score on one of them.  Recommended that she proceed with Oncotype DX recurrence score on the second tumor however patient expressed that she does not wish to proceed with any chemotherapy even if the Oncotype DX recurrence score is high.  Due to this reason we will not obtain the second Oncotype DX recurrence score.  After completion of radiation recommend that she proceed with endocrine therapy.  Recommend anastrozole 1 mg p.o. daily.  Adverse effects including but not limited to hot flashes, mood changes, fatigue, insomnia, nausea, decrease in  bone density, arthralgias and myalgias and cardiovascular risk discussed.  Patient does not want to start endocrine therapy prior to or during radiation and wants to wait until January to start endocrine therapy.    *Left breast atypical lobular hyperplasia-patient will be started on endocrine therapy after completion of radiation.    *Bone health  She has not had a DEXA in the past 6 years.  Will proceed with obtaining a DEXA prior to her next visit with me in 4 months  Recommend daily calcium and vitamin D    *Hyperlipidemia-continue atorvastatin    *Anxiety-continue BuSpar    *Follow-up-4 months    62 minutes total spent on the encounter on the same day including reviewing the medical records, reviewing the breast imaging, face-to-face time and documentation on the same day and care coordination.

## 2024-10-16 ENCOUNTER — PATIENT ROUNDING (BHMG ONLY) (OUTPATIENT)
Dept: ONCOLOGY | Facility: CLINIC | Age: 74
End: 2024-10-16
Payer: MEDICARE

## 2024-10-24 ENCOUNTER — OFFICE VISIT (OUTPATIENT)
Dept: OBSTETRICS AND GYNECOLOGY | Age: 74
End: 2024-10-24
Payer: MEDICARE

## 2024-10-24 VITALS
BODY MASS INDEX: 32.31 KG/M2 | SYSTOLIC BLOOD PRESSURE: 128 MMHG | DIASTOLIC BLOOD PRESSURE: 72 MMHG | HEIGHT: 62 IN | WEIGHT: 175.6 LBS

## 2024-10-24 DIAGNOSIS — Z76.89 ENCOUNTER TO ESTABLISH CARE: ICD-10-CM

## 2024-10-24 DIAGNOSIS — Z01.419 WELL WOMAN EXAM WITH ROUTINE GYNECOLOGICAL EXAM: Primary | ICD-10-CM

## 2024-10-24 PROBLEM — F41.8 ANXIETY ASSOCIATED WITH DEPRESSION: Status: RESOLVED | Noted: 2018-08-29 | Resolved: 2024-10-24

## 2024-10-24 PROBLEM — R73.01 IMPAIRED FASTING BLOOD SUGAR: Status: RESOLVED | Noted: 2018-04-30 | Resolved: 2024-10-24

## 2024-10-24 PROBLEM — K64.4 EXTERNAL HEMORRHOID: Status: RESOLVED | Noted: 2017-04-25 | Resolved: 2024-10-24

## 2024-10-24 PROBLEM — D12.6 TUBULAR ADENOMA OF COLON: Status: RESOLVED | Noted: 2017-11-09 | Resolved: 2024-10-24

## 2024-10-24 PROBLEM — Z86.79 HISTORY OF DIASTOLIC DYSFUNCTION: Status: RESOLVED | Noted: 2022-12-01 | Resolved: 2024-10-24

## 2024-10-24 PROBLEM — R53.83 OTHER FATIGUE: Status: RESOLVED | Noted: 2018-04-24 | Resolved: 2024-10-24

## 2024-10-24 PROBLEM — Z91.09 ENVIRONMENTAL ALLERGIES: Status: RESOLVED | Noted: 2017-04-25 | Resolved: 2024-10-24

## 2024-10-24 NOTE — PROGRESS NOTES
[facility]   Obstetrics and Gynecology   Routine Annual Visit    10/24/2024    Patient: Verito العراقي          MR#:[id number]    History of Present Illness    Chief Complaint   Patient presents with    Annual Exam    Establish Care     NGYN - AE today, MG 2024, DEXA sched 10/28/2024, Colonoscopy 2022, No problems today       74 y.o. female  who presents for annual exam.  Former patient of Dr. Payne's.  Diagnosed with right ER/OR positive Her 2 negative breast cacner this year.  Completed right lumpectomy with sentinel node biopsy on 2024.  Plan is for adjuvant radiation.  She is undecided if she is going to do radiation.  Planning a trip to [place] and will decide when she returns.    Studies reviewed:  Colonoscopy with polyp 2022, f/u in 7 years   DEXA is scheduled 10/28/2024, walks/golfs regularly  Denies h/o abnormal paps    Obstetric History:  OB History          3    Para   3    Term   3            AB        Living   3         SAB        IAB        Ectopic        Molar        Multiple        Live Births   3               Menstrual History:     No LMP recorded. Patient is postmenopausal.       Sexual History:   Sexually active with , declines STD testing      Social History:   Retired    since     ________________________________________  Patient Active Problem List   Diagnosis    Malignant neoplasm of upper-outer quadrant of right breast in female, estrogen receptor positive    Atypical lobular hyperplasia (ALH) of left breast     Past Medical History:   Diagnosis Date    Allergic rhinitis     Anxiety     Breast cancer, right     Colon polyp 10/31/2017    , ascending colon tubular adenoma    Hemorrhoids     History of bone density study 2011    nl    History of bone density study     nl 2017    History of mitral valve prolapse     she was told this in the psat, but she had a normal echo in      Hyperlipidemia     Hyperplastic colon polyp     04/12 hyperplastic rectal and colon polyps,      Past Surgical History:   Procedure Laterality Date    BREAST LUMPECTOMY WITH SENTINEL NODE BIOPSY Bilateral 9/12/2024    Procedure: Right breast partial mastectomy, NACHO guided to 2 sites.  Right sentinel lymph node biopsy.  Left NACHO guided excisional biopsy;  Surgeon: Marilia Mosley MD;  [facility] MA[facility];  Service: General;  Laterality: Bilateral;    BREAST SURGERY Bilateral 9/12/2024    Procedure: RIGHT ONCOPLASTIC AND LEFT MASTOPEXY FOR SYMMETRY, BILATERAL REMOVAL OF BREAST IMPLANTS;  Surgeon: Wayne Sanchez MD;  Location: [facility] MAIN OR;  Service: Plastics;  Laterality: Bilateral;    COLONOSCOPY W/ POLYPECTOMY  10/31/2017    , ascending colon 3 mm 10/31/2017, sigmoid colon (3mm) - hyperplastic polyp  04/04/2012     Social History     Tobacco Use   Smoking Status Never    Passive exposure: Never   Smokeless Tobacco Never     Family History   Problem Relation Age of Onset    Diabetes Father     Heart disease Father     Prostate cancer Father     Macular degeneration Mother     Hypertension Mother     Dementia Mother     Heart disease Paternal Grandfather     Heart disease Maternal Grandmother     Malig Hyperthermia Neg Hx     Breast cancer Neg Hx     Ovarian cancer Neg Hx     Uterine cancer Neg Hx     Colon cancer Neg Hx      Prior to Admission medications    Medication Sig Start Date End Date Taking? Authorizing Provider   atorvastatin (LIPITOR) 20 MG tablet TAKE 1 TABLET DAILY 6/17/24  Yes Christa Herr APRN   busPIRone (BUSPAR) 5 MG tablet TAKE 1 TABLET THREE TIMES A DAY 3/11/24  Yes Christa Herr APRN   Cetirizine HCl (ZYRTEC PO) Take 10 mg by mouth Daily. 12/15/14  Yes Provider, MD Leslie   fluticasone (FLONASE) 50 MCG/ACT nasal spray 2 sprays by Each Nare route Daily.  Patient taking differently: 1 spray by Each Nare route Daily. 11/4/21  Yes Christa Herr APRN  "  anastrozole (ARIMIDEX) 1 MG tablet Take 1 tablet by mouth Daily.  Patient not taking: Reported on 10/24/2024 10/15/24   Esperanza Vasquez MD   acetaminophen (TYLENOL) 325 MG tablet Take 2 tablets by mouth Every 4 (Four) Hours As Needed for Mild Pain. 9/12/24 10/24/24  Wayne Sanchez MD   docusate sodium (COLACE) 250 MG capsule Take 1 capsule by mouth Daily As Needed for Constipation. 9/12/24 10/24/24  Wayne Sanchez MD   gabapentin (Neurontin) 100 MG capsule Take 1 capsule by mouth Every 8 (Eight) Hours. 9/12/24 10/24/24  Wayne Sanchez MD   HYDROcodone-acetaminophen (NORCO) 5-325 MG per tablet Take 1 tablet by mouth Every 4 (Four) Hours As Needed (Pain). 9/12/24 10/24/24  Wayne Sanchez MD   ondansetron ODT (ZOFRAN-ODT) 8 MG disintegrating tablet Place 1 tablet on the tongue Every 8 (Eight) Hours As Needed for Nausea or Vomiting. 9/12/24 10/24/24  Wayne Sanchez MD   sennosides-docusate (PERICOLACE) 8.6-50 MG per tablet Take 2 tablets by mouth Daily As Needed for Constipation. 9/12/24 10/24/24  Wayne Sanchez MD     ________________________________________    Current contraception: post menopausal status  History of abnormal Pap smear: no  Family history of uterine or ovarian cancer: no  Family History of colon cancer/colon polyps: no  History of abnormal mammogram: yes - see above    The following portions of the patient's history were reviewed and updated as appropriate: allergies, current medications, past family history, past medical history, past social history, past surgical history, and problem list.    Review of Systems   All other systems reviewed and are negative.           Objective     /72   Ht 158.1 cm (62.24\")   Wt 79.7 kg (175 lb 9.6 oz)   Breastfeeding No   BMI 31.87 kg/m²    BP Readings from Last 3 Encounters:   10/24/24 128/72   10/15/24 146/84   10/02/24 141/81      Wt Readings from Last 3 Encounters:   10/24/24 79.7 kg (175 lb 9.6 oz) " "  10/15/24 79.9 kg (176 lb 1.6 oz)   10/02/24 80.3 kg (177 lb)        BMI: Estimated body mass index is 31.87 kg/m² as calculated from the following:    Height as of this encounter: 158.1 cm (62.24\").    Weight as of this encounter: 79.7 kg (175 lb 9.6 oz).    Physical Exam  Vitals and nursing note reviewed.   Constitutional:       General: She is not in acute distress.     Appearance: Normal appearance.   HENT:      Head: Normocephalic and atraumatic.   Eyes:      Extraocular Movements: Extraocular movements intact.   Cardiovascular:      Rate and Rhythm: Normal rate and regular rhythm.      Pulses: Normal pulses.      Heart sounds: No murmur heard.  Pulmonary:      Effort: Pulmonary effort is normal. No respiratory distress.      Breath sounds: Normal breath sounds.   Chest:      Comments: Deferred due to recent surgery  Abdominal:      General: There is no distension.      Palpations: Abdomen is soft. There is no mass.      Tenderness: There is no abdominal tenderness.   Genitourinary:     General: Normal vulva.      Labia:         Right: No rash or lesion.         Left: No rash or lesion.       Urethra: No prolapse, urethral swelling or urethral lesion.      Vagina: Normal.      Cervix: Normal.      Uterus: Normal.       Adnexa: Right adnexa normal and left adnexa normal.      Comments: Bladder: no masses or tenderness  Perineum/Anus: no masses, lesions, or skin changes  Musculoskeletal:         General: No swelling. Normal range of motion.      Cervical back: Normal range of motion.   Skin:     General: Skin is warm and dry.   Neurological:      General: No focal deficit present.      Mental Status: She is alert and oriented to person, place, and time.   Psychiatric:         Mood and Affect: Mood normal.         Behavior: Behavior normal.         As part of wellness and prevention, the following topics were discussed with the patient:  Encouraged self breast exam  Physical activity and regular exercised " encouraged.   Injury prevention discussed.  Healthy weight discussed.  Nutrition discussed.  Substance abuse/misuse discussed.  Sexual behavior/safe practices discussed.   Sexual transmitted disease prevention   Mental health discussed.           Assessment:  Diagnoses and all orders for this visit:    1. Well woman exam with routine gynecological exam (Primary)    2. Encounter to establish care      -Breast exam deferred since she just underwent surgery.  Plan to complete annually in the future.  - No need for Paps  - Colonoscopy up-to-date  - DEXA scheduled  - Declined STD screen    Plan:  Return in about 1 year (around 10/24/2025) for Annual.      Elisabeth Yap MD  10/24/2024 15:32 EDT

## 2024-10-28 ENCOUNTER — HOSPITAL ENCOUNTER (OUTPATIENT)
Facility: HOSPITAL | Age: 74
Discharge: HOME | End: 2024-10-28
Admitting: INTERNAL MEDICINE
Payer: MEDICARE

## 2024-10-28 DIAGNOSIS — Z78.0 POSTMENOPAUSAL: ICD-10-CM

## 2024-10-28 PROCEDURE — 77080 DXA BONE DENSITY AXIAL: CPT

## 2024-11-19 ENCOUNTER — PATIENT OUTREACH (OUTPATIENT)
Dept: OTHER | Facility: HOSPITAL | Age: 74
End: 2024-11-19
Payer: MEDICARE

## 2024-11-19 NOTE — PROGRESS NOTES
Called Ms. العراقي to see how she was doing. Left a message with my contact information and asked her to call back at her convenience.

## 2024-12-02 ENCOUNTER — HOSPITAL ENCOUNTER (OUTPATIENT)
Dept: RADIATION ONCOLOGY | Facility: HOSPITAL | Age: 74
Discharge: HOME | End: 2024-12-02
Payer: MEDICARE

## 2024-12-02 ENCOUNTER — HOSPITAL ENCOUNTER (OUTPATIENT)
Dept: RADIATION ONCOLOGY | Facility: HOSPITAL | Age: 74
Setting detail: RADIATION/ONCOLOGY SERIES
End: 2024-12-02
Payer: MEDICARE

## 2024-12-02 ENCOUNTER — OFFICE VISIT (OUTPATIENT)
Dept: RADIATION ONCOLOGY | Facility: HOSPITAL | Age: 74
End: 2024-12-02
Payer: MEDICARE

## 2024-12-02 VITALS
HEART RATE: 87 BPM | WEIGHT: 177 LBS | DIASTOLIC BLOOD PRESSURE: 82 MMHG | OXYGEN SATURATION: 96 % | SYSTOLIC BLOOD PRESSURE: 145 MMHG | BODY MASS INDEX: 32.12 KG/M2

## 2024-12-02 DIAGNOSIS — Z17.0 MALIGNANT NEOPLASM OF UPPER-OUTER QUADRANT OF RIGHT BREAST IN FEMALE, ESTROGEN RECEPTOR POSITIVE: Primary | ICD-10-CM

## 2024-12-02 DIAGNOSIS — C50.411 MALIGNANT NEOPLASM OF UPPER-OUTER QUADRANT OF RIGHT BREAST IN FEMALE, ESTROGEN RECEPTOR POSITIVE: Primary | ICD-10-CM

## 2024-12-02 PROCEDURE — 77290 THER RAD SIMULAJ FIELD CPLX: CPT | Performed by: RADIOLOGY

## 2024-12-02 PROCEDURE — 77332 RADIATION TREATMENT AID(S): CPT | Performed by: RADIOLOGY

## 2024-12-02 PROCEDURE — G0463 HOSPITAL OUTPT CLINIC VISIT: HCPCS | Performed by: RADIOLOGY

## 2024-12-02 PROCEDURE — 77263 THER RADIOLOGY TX PLNG CPLX: CPT | Performed by: RADIOLOGY

## 2024-12-02 NOTE — PROGRESS NOTES
Stage IA (cT1c, cN0, cM0, G2, ER+, KY+, HER2-)      CC: right breast cancer, evaluate for radiation  [id number]                            Dear Marilia Mosley MD     I had the pleasure of seeing Verito العراقي  today in the [facility].   The patient is a 74 y.o. female with recently diagnosed right breast cancer.  She had a screening mammogram on 02/28/2024 which showed bilateral pre-pectoral saline implants, a few punctate calcifications with grouped distribution seen in the anterior one third region of the left breast at 3 o'clock.BI-RADS 0: Incomplete.  She had a left breast diagnostic mammogram on 03/20/2024 which showed amorphous calcifications with grouped distribution in the anterior one third region of the left breast at 3 o'clock.BI-RADS 4B: Suspicious.     She had a stereotactic biopsy of the left breast 3 oclock calcs on 4/15/24 which showed Incidental atypical lobular hyperplasia.     She had a breast mri on 6/10/24 which showed No suspicious enhancement is identified at 3:00 in the left breastat a site of biopsy-proven atypical lobular hyperplasia, which is markedwith a top hat clip. Consider surgical management.2.  Suspicious 1.2 cm enhancing mass at 9:00 with a possiblemammographic correlate and suspicious 1 cm enhancing mass at 8:00 in theright breast. Recommend further evaluation with diagnostic mammogram andtargeted ultrasound, followed by possible stereotactic and/or ultrasoundguided core needle biopsies.     She had a right breast diagnostic mammogram and ultrasound on 7/9/24 which showed the more anteriorly positioned 1.2 cm mass in the mid to posterior depth outer right breast is definitively seen only on the implant displaced extended CC view. The masses are otherwise likely obscured by the breast implant on the views without implant displacement. Partially imaged breast implant. No concerning microcalcification or architectural distortion. Ultrasound showed at the 9 o'clock position of  the right breast 8 cm from the nipple, there is a well-circumscribed ovoid wider than tall heterogeneous probably hypoechoic mass measuring 1.2 cm x 0.9 cm x 0.5 cm superficial to the partially imaged breast implant. At the 9:30 position approximately 9 cm from the nipple, there is an additional hypoechoic mass measuring 0.6 cm x 0.6 cm x 0.5 cm, also superficial to the breast implant. No significant shadowing.IMPRESSION: The 2 small masses seen on the prior MRI study in the lateral right breast superficial to the breast implant are visualized on ultrasound. Recommend ultrasound-guided right breast biopsy.BI-RADS 4. Suspicious.     She underwent a biopsy of the right breast 10 colock position on 7/25/24 which revealed grade ii invasive ductal carcinoma, 7mm no dcis, no lvsi.  Biopsy of right breast 9:30 position revealed invasive ductal carcinoma grade II, 8mm, rare focus of DCIS cribriform type, no lvsi ER % OH % Her  2negative ki67 15%.  The second tumor was ER % OH 41-50$ Her 2 neg ki67 40%.       She is  G: 3. P: 3. AB: 0 menarche age 12 and first childbirth age 20.  She breast fed for one months.  She went through menopause in her 40s.  She used oral contraceptives for a few years and has never used hormone replacement therapy.      Interval hx 10/2/24:  She underwent a right breast lumpectomy and sentinel node biopsy on 9/12/24 with pathology revealing 11mm invasive ductal carcinoma, grade II, with low grade DCIS 5mm, as well as a second site of invasive ductal carcinoma measuirng 12mm, grade II.  One sentinel node was negative for metastatic disease. The closest medial margin was 4.5mm. her pathologic stage was [id number].      She also had left breast excisional biopsy with pathology revealing radial scar, focal fibroadenomatous hyperplasia/fibroadenoma, fibrocystic change with clustered ductal and apocrine cysts, intact intraductal papilloma and micro-papilloma and duct ectasia.     She is  recovering well from her surgery and returns today for re-evaluation and finalization of her treatment plan.     Interval hx 12/2/24:  She has now returned from her trip to [place] and she is ready to begin radiation.  She has no new complaints.         Review of Systems   Constitutional: Negative.    Musculoskeletal: Negative.    Neurological: Negative.    Psychiatric/Behavioral: Negative.           Past Medical History:   Diagnosis Date   • Allergic rhinitis    • Anxiety    • Breast cancer, right    • Colon polyp 10/31/2017    , ascending colon tubular adenoma   • Hemorrhoids    • History of bone density study 08/2011    nl   • History of bone density study     nl 06/05/2017   • History of mitral valve prolapse     she was told this in the psat, but she had a normal echo in 2016   • Hyperlipidemia    • Hyperplastic colon polyp     04/12 hyperplastic rectal and colon polyps,          Past Surgical History:   Procedure Laterality Date   • BREAST LUMPECTOMY WITH SENTINEL NODE BIOPSY Bilateral 9/12/2024    Procedure: Right breast partial mastectomy, NACHO guided to 2 sites.  Right sentinel lymph node biopsy.  Left NACHO guided excisional biopsy;  Surgeon: Marilia Mosley MD;  Location: [facility];  Service: General;  Laterality: Bilateral;   • BREAST SURGERY Bilateral 9/12/2024    Procedure: RIGHT ONCOPLASTIC AND LEFT MASTOPEXY FOR SYMMETRY, BILATERAL REMOVAL OF BREAST IMPLANTS;  Surgeon: Wayne Sanchez MD;  Location: [facility];  Service: Plastics;  Laterality: Bilateral;   • COLONOSCOPY W/ POLYPECTOMY  10/31/2017    , ascending colon 3 mm 10/31/2017, sigmoid colon (3mm) - hyperplastic polyp  04/04/2012         Social History     Socioeconomic History   • Marital status:    • Number of children: 3   Tobacco Use   • Smoking status: Never     Passive exposure: Never   • Smokeless tobacco: Never   Vaping Use   • Vaping status: Never Used   Substance and Sexual Activity   •  Alcohol use: Yes     Alcohol/week: 7.0 standard drinks of alcohol     Types: 7 Glasses of wine per week     Comment: social   • Drug use: No   • Sexual activity: Yes     Partners: Male     Birth control/protection: Post-menopausal         Family History   Problem Relation Age of Onset   • Diabetes Father    • Heart disease Father    • Prostate cancer Father    • Macular degeneration Mother    • Hypertension Mother    • Dementia Mother    • Heart disease Paternal Grandfather    • Heart disease Maternal Grandmother    • Malig Hyperthermia Neg Hx    • Breast cancer Neg Hx    • Ovarian cancer Neg Hx    • Uterine cancer Neg Hx    • Colon cancer Neg Hx           Objective    Physical Exam  Constitutional:       Appearance: Normal appearance.   Chest:          Comments: Incisions well healed, no palpable masses in either breast or axilla  Abdominal:      General: Abdomen is flat.   Neurological:      General: No focal deficit present.      Mental Status: She is alert.   Psychiatric:         Mood and Affect: Mood normal.         Current Outpatient Medications on File Prior to Visit   Medication Sig Dispense Refill   • anastrozole (ARIMIDEX) 1 MG tablet Take 1 tablet by mouth Daily. (Patient not taking: Reported on 10/24/2024) 30 tablet 3   • atorvastatin (LIPITOR) 20 MG tablet TAKE 1 TABLET DAILY 90 tablet 3   • busPIRone (BUSPAR) 5 MG tablet TAKE 1 TABLET THREE TIMES A  tablet 3   • Cetirizine HCl (ZYRTEC PO) Take 10 mg by mouth Daily.     • fluticasone (FLONASE) 50 MCG/ACT nasal spray 2 sprays by Each Nare route Daily. (Patient taking differently: 1 spray by Each Nare route Daily.) 16 mL 11     No current facility-administered medications on file prior to visit.       ALLERGIES:    Allergies   Allergen Reactions   • Ciprofloxacin Other (See Comments)     UNSURE OF RX       There were no vitals taken for this visit.     (0) Fully active, able to carry on all predisease performance without restriction      10/15/2024      9:43 AM   Current Status   ECOG score 0         Assessment & Plan     74 year old female with [id number] right breast cancer, ER PA Positive Her 2 negative, [id number] s/p lumpectomy with 2 foci of invasive lobular carcinoma. I again discussed with her the role for post-operative radiation therapy to the right breast to decrease the risk of local recurrence.  Given the multifocal nature of her disease and both tumors over 1cm, I recommend adjuvant radiation.       I discussed with her in detail the risks, benefits and rationale of radiation therapy to the right breast to include but not limited to the following:     Acute: skin erythema, breakdown/moist desquamation, swelling or discomfort of the breast, fatigue, pneumonitis resulting in shortness of breath, cough or pain, damage to the implant or infection requiring removal     Late: Permanent skin changes including hyperpigmentation, telangiectasias, fibrosis of the breast resulting in smaller size or poor cosmetic outcome, capsular contracture of implant requiring removal, late edema or cellulitis, late rib fracture, late pulmonary fibrosis and the remote risk of second malignancies.       She voiced understanding.  She has now returned from her trip to [place] and is ready to start radiation.  We will proceed with CT simulation today and begin her treatment next week. I plan to deliver a dose of [id number] in 15 fractions with no boost.     I personally spent greater than 30 minutes today assessing, managing, discussing and documenting my visit with the patient. That time includes review of records, imaging and pathology reports, obtaining my own history, performing a medically appropriate evaluation, counseling and educating the patient, discussing goals, logistics, alternatives and risks of my recommendations, surveillance options and potential outcomes. It also includes the time documenting the clinical information in the EMR and communicating my  recommendations to the other involved physicians.                   Thank you very much for allowing me to participate in the care of this very pleasant patient.    Sincerely,      Gabriella Alvarado MD

## 2024-12-03 PROCEDURE — 77334 RADIATION TREATMENT AID(S): CPT | Performed by: RADIOLOGY

## 2024-12-03 PROCEDURE — 77295 3-D RADIOTHERAPY PLAN: CPT | Performed by: RADIOLOGY

## 2024-12-03 PROCEDURE — 77300 RADIATION THERAPY DOSE PLAN: CPT | Performed by: RADIOLOGY

## 2024-12-09 ENCOUNTER — HOSPITAL ENCOUNTER (OUTPATIENT)
Dept: RADIATION ONCOLOGY | Facility: HOSPITAL | Age: 74
Discharge: HOME | End: 2024-12-09
Payer: MEDICARE

## 2024-12-09 LAB
RAD ONC ARIA COURSE ID: NORMAL
RAD ONC ARIA COURSE INTENT: NORMAL
RAD ONC ARIA COURSE LAST TREATMENT DATE: NORMAL
RAD ONC ARIA COURSE START DATE: NORMAL
RAD ONC ARIA COURSE TREATMENT ELAPSED DAYS: 0
RAD ONC ARIA FIRST TREATMENT DATE: NORMAL
RAD ONC ARIA PLAN FRACTIONS TREATED TO DATE: 1
RAD ONC ARIA PLAN ID: NORMAL
RAD ONC ARIA PLAN PRESCRIBED DOSE PER FRACTION: 2.66 GY
RAD ONC ARIA PLAN PRIMARY REFERENCE POINT: NORMAL
RAD ONC ARIA PLAN TOTAL FRACTIONS PRESCRIBED: 15
RAD ONC ARIA PLAN TOTAL PRESCRIBED DOSE: 3990 CGY
RAD ONC ARIA REFERENCE POINT DOSAGE GIVEN TO DATE: 2.66 GY
RAD ONC ARIA REFERENCE POINT ID: NORMAL
RAD ONC ARIA REFERENCE POINT SESSION DOSAGE GIVEN: 2.66 GY

## 2024-12-09 PROCEDURE — 77280 THER RAD SIMULAJ FIELD SMPL: CPT | Performed by: RADIOLOGY

## 2024-12-09 PROCEDURE — 77427 RADIATION TX MANAGEMENT X5: CPT | Performed by: RADIOLOGY

## 2024-12-09 PROCEDURE — G6002 STEREOSCOPIC X-RAY GUIDANCE: HCPCS | Performed by: RADIOLOGY

## 2024-12-09 PROCEDURE — 77412 RADIATION TX DELIVERY LVL 3: CPT | Performed by: RADIOLOGY

## 2024-12-10 ENCOUNTER — RADIATION ONCOLOGY WEEKLY ASSESSMENT (OUTPATIENT)
Dept: RADIATION ONCOLOGY | Facility: HOSPITAL | Age: 74
End: 2024-12-10
Payer: MEDICARE

## 2024-12-10 ENCOUNTER — HOSPITAL ENCOUNTER (OUTPATIENT)
Dept: RADIATION ONCOLOGY | Facility: HOSPITAL | Age: 74
Discharge: HOME | End: 2024-12-10

## 2024-12-10 VITALS
DIASTOLIC BLOOD PRESSURE: 69 MMHG | WEIGHT: 175 LBS | BODY MASS INDEX: 31.76 KG/M2 | SYSTOLIC BLOOD PRESSURE: 143 MMHG | OXYGEN SATURATION: 99 % | HEART RATE: 87 BPM

## 2024-12-10 DIAGNOSIS — Z17.0 MALIGNANT NEOPLASM OF UPPER-OUTER QUADRANT OF RIGHT BREAST IN FEMALE, ESTROGEN RECEPTOR POSITIVE: Primary | ICD-10-CM

## 2024-12-10 DIAGNOSIS — C50.411 MALIGNANT NEOPLASM OF UPPER-OUTER QUADRANT OF RIGHT BREAST IN FEMALE, ESTROGEN RECEPTOR POSITIVE: Primary | ICD-10-CM

## 2024-12-10 LAB
RAD ONC ARIA COURSE ID: NORMAL
RAD ONC ARIA COURSE INTENT: NORMAL
RAD ONC ARIA COURSE LAST TREATMENT DATE: NORMAL
RAD ONC ARIA COURSE START DATE: NORMAL
RAD ONC ARIA COURSE TREATMENT ELAPSED DAYS: 1
RAD ONC ARIA FIRST TREATMENT DATE: NORMAL
RAD ONC ARIA PLAN FRACTIONS TREATED TO DATE: 2
RAD ONC ARIA PLAN ID: NORMAL
RAD ONC ARIA PLAN PRESCRIBED DOSE PER FRACTION: 2.66 GY
RAD ONC ARIA PLAN PRIMARY REFERENCE POINT: NORMAL
RAD ONC ARIA PLAN TOTAL FRACTIONS PRESCRIBED: 15
RAD ONC ARIA PLAN TOTAL PRESCRIBED DOSE: 3990 CGY
RAD ONC ARIA REFERENCE POINT DOSAGE GIVEN TO DATE: 5.32 GY
RAD ONC ARIA REFERENCE POINT ID: NORMAL
RAD ONC ARIA REFERENCE POINT SESSION DOSAGE GIVEN: 2.66 GY

## 2024-12-10 PROCEDURE — 77417 THER RADIOLOGY PORT IMAGE(S): CPT | Performed by: RADIOLOGY

## 2024-12-10 PROCEDURE — 77412 RADIATION TX DELIVERY LVL 3: CPT | Performed by: RADIOLOGY

## 2024-12-10 NOTE — PROGRESS NOTES
Radiation Oncology  On-Treatment Note      Patient: Verito العراقي    MRN: [id number]    Attending Physician: Gabriella Alvarado MD     Diagnosis:     ICD-10-CM ICD-9-CM   1. Malignant neoplasm of upper-outer quadrant of right breast in female, estrogen receptor positive  C50.411 174.4    Z17.0 V86.0       Radiation Therapy Visit:  Continue radiation therapy, Dosimetry plan remains acceptable, Films reviewed and remains acceptable, Pain assessed, Pain management planned, Radiation dose schedule reviewed and remains acceptable, Radiation technique remains acceptable, and Symptoms within expected range    Radiation Treatments       Active   Plans   Rt Breast   Most recent treatment: Dose planned: 266 cGy (fraction 2 on 12/10/2024)   Total: Dose planned: 3,990 cGy (15 fractions)   Elapsed Days: 1      Reference Points   Rx Rt Breast   Most recent treatment: Dose given: 266 cGy (on 12/10/2024)   Total: Dose given: 532 cGy   Elapsed Days: 1                      Physical Examination:  Vitals: Blood pressure 143/69, pulse 87, weight 79.4 kg (175 lb), SpO2 99%, not currently breastfeeding.  Pain Score    12/10/24 1450   PainSc: 0-No pain       Fully active, able to carry on all pre-disease performance without restriction = 0    We examined the relevant areas: yes  Findings are within the expected range for this stage of treatment: yes  -------------------------------------------------------------------------------------------------------------------    ACTION ITEMS:  Patient tolerating treatment well and as expected for this stage in their treatment        Gabriella Alvarado MD  Radiation Oncology

## 2024-12-11 ENCOUNTER — HOSPITAL ENCOUNTER (OUTPATIENT)
Dept: RADIATION ONCOLOGY | Facility: HOSPITAL | Age: 74
Discharge: HOME | End: 2024-12-11

## 2024-12-11 LAB
RAD ONC ARIA COURSE ID: NORMAL
RAD ONC ARIA COURSE INTENT: NORMAL
RAD ONC ARIA COURSE LAST TREATMENT DATE: NORMAL
RAD ONC ARIA COURSE START DATE: NORMAL
RAD ONC ARIA COURSE TREATMENT ELAPSED DAYS: 2
RAD ONC ARIA FIRST TREATMENT DATE: NORMAL
RAD ONC ARIA PLAN FRACTIONS TREATED TO DATE: 3
RAD ONC ARIA PLAN ID: NORMAL
RAD ONC ARIA PLAN PRESCRIBED DOSE PER FRACTION: 2.66 GY
RAD ONC ARIA PLAN PRIMARY REFERENCE POINT: NORMAL
RAD ONC ARIA PLAN TOTAL FRACTIONS PRESCRIBED: 15
RAD ONC ARIA PLAN TOTAL PRESCRIBED DOSE: 3990 CGY
RAD ONC ARIA REFERENCE POINT DOSAGE GIVEN TO DATE: 7.98 GY
RAD ONC ARIA REFERENCE POINT ID: NORMAL
RAD ONC ARIA REFERENCE POINT SESSION DOSAGE GIVEN: 2.66 GY

## 2024-12-11 PROCEDURE — 77412 RADIATION TX DELIVERY LVL 3: CPT | Performed by: RADIOLOGY

## 2024-12-11 PROCEDURE — 77336 RADIATION PHYSICS CONSULT: CPT | Performed by: RADIOLOGY

## 2024-12-12 ENCOUNTER — HOSPITAL ENCOUNTER (OUTPATIENT)
Dept: RADIATION ONCOLOGY | Facility: HOSPITAL | Age: 74
Discharge: HOME | End: 2024-12-12

## 2024-12-12 LAB
RAD ONC ARIA COURSE ID: NORMAL
RAD ONC ARIA COURSE INTENT: NORMAL
RAD ONC ARIA COURSE LAST TREATMENT DATE: NORMAL
RAD ONC ARIA COURSE START DATE: NORMAL
RAD ONC ARIA COURSE TREATMENT ELAPSED DAYS: 3
RAD ONC ARIA FIRST TREATMENT DATE: NORMAL
RAD ONC ARIA PLAN FRACTIONS TREATED TO DATE: 4
RAD ONC ARIA PLAN ID: NORMAL
RAD ONC ARIA PLAN PRESCRIBED DOSE PER FRACTION: 2.66 GY
RAD ONC ARIA PLAN PRIMARY REFERENCE POINT: NORMAL
RAD ONC ARIA PLAN TOTAL FRACTIONS PRESCRIBED: 15
RAD ONC ARIA PLAN TOTAL PRESCRIBED DOSE: 3990 CGY
RAD ONC ARIA REFERENCE POINT DOSAGE GIVEN TO DATE: 10.64 GY
RAD ONC ARIA REFERENCE POINT ID: NORMAL
RAD ONC ARIA REFERENCE POINT SESSION DOSAGE GIVEN: 2.66 GY

## 2024-12-12 PROCEDURE — 77412 RADIATION TX DELIVERY LVL 3: CPT | Performed by: RADIOLOGY

## 2024-12-13 ENCOUNTER — HOSPITAL ENCOUNTER (OUTPATIENT)
Dept: RADIATION ONCOLOGY | Facility: HOSPITAL | Age: 74
Discharge: HOME | End: 2024-12-13

## 2024-12-13 LAB
RAD ONC ARIA COURSE ID: NORMAL
RAD ONC ARIA COURSE INTENT: NORMAL
RAD ONC ARIA COURSE LAST TREATMENT DATE: NORMAL
RAD ONC ARIA COURSE START DATE: NORMAL
RAD ONC ARIA COURSE TREATMENT ELAPSED DAYS: 4
RAD ONC ARIA FIRST TREATMENT DATE: NORMAL
RAD ONC ARIA PLAN FRACTIONS TREATED TO DATE: 5
RAD ONC ARIA PLAN ID: NORMAL
RAD ONC ARIA PLAN PRESCRIBED DOSE PER FRACTION: 2.66 GY
RAD ONC ARIA PLAN PRIMARY REFERENCE POINT: NORMAL
RAD ONC ARIA PLAN TOTAL FRACTIONS PRESCRIBED: 15
RAD ONC ARIA PLAN TOTAL PRESCRIBED DOSE: 3990 CGY
RAD ONC ARIA REFERENCE POINT DOSAGE GIVEN TO DATE: 13.3 GY
RAD ONC ARIA REFERENCE POINT ID: NORMAL
RAD ONC ARIA REFERENCE POINT SESSION DOSAGE GIVEN: 2.66 GY

## 2024-12-13 PROCEDURE — 77412 RADIATION TX DELIVERY LVL 3: CPT | Performed by: RADIOLOGY

## 2024-12-16 ENCOUNTER — HOSPITAL ENCOUNTER (OUTPATIENT)
Dept: RADIATION ONCOLOGY | Facility: HOSPITAL | Age: 74
Discharge: HOME | End: 2024-12-16
Payer: MEDICARE

## 2024-12-16 LAB
RAD ONC ARIA COURSE ID: NORMAL
RAD ONC ARIA COURSE INTENT: NORMAL
RAD ONC ARIA COURSE LAST TREATMENT DATE: NORMAL
RAD ONC ARIA COURSE START DATE: NORMAL
RAD ONC ARIA COURSE TREATMENT ELAPSED DAYS: 7
RAD ONC ARIA FIRST TREATMENT DATE: NORMAL
RAD ONC ARIA PLAN FRACTIONS TREATED TO DATE: 6
RAD ONC ARIA PLAN ID: NORMAL
RAD ONC ARIA PLAN PRESCRIBED DOSE PER FRACTION: 2.66 GY
RAD ONC ARIA PLAN PRIMARY REFERENCE POINT: NORMAL
RAD ONC ARIA PLAN TOTAL FRACTIONS PRESCRIBED: 15
RAD ONC ARIA PLAN TOTAL PRESCRIBED DOSE: 3990 CGY
RAD ONC ARIA REFERENCE POINT DOSAGE GIVEN TO DATE: 15.96 GY
RAD ONC ARIA REFERENCE POINT ID: NORMAL
RAD ONC ARIA REFERENCE POINT SESSION DOSAGE GIVEN: 2.66 GY

## 2024-12-16 PROCEDURE — 77412 RADIATION TX DELIVERY LVL 3: CPT | Performed by: RADIOLOGY

## 2024-12-16 PROCEDURE — 77427 RADIATION TX MANAGEMENT X5: CPT | Performed by: RADIOLOGY

## 2024-12-17 ENCOUNTER — HOSPITAL ENCOUNTER (OUTPATIENT)
Dept: RADIATION ONCOLOGY | Facility: HOSPITAL | Age: 74
Discharge: HOME | End: 2024-12-17

## 2024-12-17 LAB
RAD ONC ARIA COURSE ID: NORMAL
RAD ONC ARIA COURSE INTENT: NORMAL
RAD ONC ARIA COURSE LAST TREATMENT DATE: NORMAL
RAD ONC ARIA COURSE START DATE: NORMAL
RAD ONC ARIA COURSE TREATMENT ELAPSED DAYS: 8
RAD ONC ARIA FIRST TREATMENT DATE: NORMAL
RAD ONC ARIA PLAN FRACTIONS TREATED TO DATE: 7
RAD ONC ARIA PLAN ID: NORMAL
RAD ONC ARIA PLAN PRESCRIBED DOSE PER FRACTION: 2.66 GY
RAD ONC ARIA PLAN PRIMARY REFERENCE POINT: NORMAL
RAD ONC ARIA PLAN TOTAL FRACTIONS PRESCRIBED: 15
RAD ONC ARIA PLAN TOTAL PRESCRIBED DOSE: 3990 CGY
RAD ONC ARIA REFERENCE POINT DOSAGE GIVEN TO DATE: 18.62 GY
RAD ONC ARIA REFERENCE POINT ID: NORMAL
RAD ONC ARIA REFERENCE POINT SESSION DOSAGE GIVEN: 2.66 GY

## 2024-12-17 PROCEDURE — 77412 RADIATION TX DELIVERY LVL 3: CPT | Performed by: RADIOLOGY

## 2024-12-17 PROCEDURE — 77417 THER RADIOLOGY PORT IMAGE(S): CPT | Performed by: RADIOLOGY

## 2024-12-18 ENCOUNTER — HOSPITAL ENCOUNTER (OUTPATIENT)
Dept: RADIATION ONCOLOGY | Facility: HOSPITAL | Age: 74
Discharge: HOME | End: 2024-12-18

## 2024-12-18 ENCOUNTER — RADIATION ONCOLOGY WEEKLY ASSESSMENT (OUTPATIENT)
Dept: RADIATION ONCOLOGY | Facility: HOSPITAL | Age: 74
End: 2024-12-18
Payer: MEDICARE

## 2024-12-18 VITALS
HEART RATE: 97 BPM | WEIGHT: 175 LBS | OXYGEN SATURATION: 97 % | SYSTOLIC BLOOD PRESSURE: 128 MMHG | DIASTOLIC BLOOD PRESSURE: 84 MMHG | BODY MASS INDEX: 31.76 KG/M2

## 2024-12-18 DIAGNOSIS — Z17.0 MALIGNANT NEOPLASM OF UPPER-OUTER QUADRANT OF RIGHT BREAST IN FEMALE, ESTROGEN RECEPTOR POSITIVE: Primary | ICD-10-CM

## 2024-12-18 DIAGNOSIS — C50.411 MALIGNANT NEOPLASM OF UPPER-OUTER QUADRANT OF RIGHT BREAST IN FEMALE, ESTROGEN RECEPTOR POSITIVE: Primary | ICD-10-CM

## 2024-12-18 LAB
RAD ONC ARIA COURSE ID: NORMAL
RAD ONC ARIA COURSE INTENT: NORMAL
RAD ONC ARIA COURSE LAST TREATMENT DATE: NORMAL
RAD ONC ARIA COURSE START DATE: NORMAL
RAD ONC ARIA COURSE TREATMENT ELAPSED DAYS: 9
RAD ONC ARIA FIRST TREATMENT DATE: NORMAL
RAD ONC ARIA PLAN FRACTIONS TREATED TO DATE: 8
RAD ONC ARIA PLAN ID: NORMAL
RAD ONC ARIA PLAN PRESCRIBED DOSE PER FRACTION: 2.66 GY
RAD ONC ARIA PLAN PRIMARY REFERENCE POINT: NORMAL
RAD ONC ARIA PLAN TOTAL FRACTIONS PRESCRIBED: 15
RAD ONC ARIA PLAN TOTAL PRESCRIBED DOSE: 3990 CGY
RAD ONC ARIA REFERENCE POINT DOSAGE GIVEN TO DATE: 21.28 GY
RAD ONC ARIA REFERENCE POINT ID: NORMAL
RAD ONC ARIA REFERENCE POINT SESSION DOSAGE GIVEN: 2.66 GY

## 2024-12-18 PROCEDURE — 77412 RADIATION TX DELIVERY LVL 3: CPT | Performed by: RADIOLOGY

## 2024-12-18 PROCEDURE — 77336 RADIATION PHYSICS CONSULT: CPT | Performed by: RADIOLOGY

## 2024-12-18 NOTE — PROGRESS NOTES
Radiation Oncology  On-Treatment Note      Patient: Verito العراقي    MRN: [id number]    Attending Physician: Gabriella Alvarado MD     Diagnosis:     ICD-10-CM ICD-9-CM   1. Malignant neoplasm of upper-outer quadrant of right breast in female, estrogen receptor positive  C50.411 174.4    Z17.0 V86.0       Radiation Therapy Visit:  Continue radiation therapy, Dosimetry plan remains acceptable, Films reviewed and remains acceptable, Pain assessed, Pain management planned, Radiation dose schedule reviewed and remains acceptable, Radiation technique remains acceptable, and Symptoms within expected range    Radiation Treatments       Active   Plans   Rt Breast   Most recent treatment: Dose planned: 266 cGy (fraction 7 on 12/17/2024)   Total: Dose planned: 3,990 cGy (15 fractions)   Elapsed Days: 8      Reference Points   Rx Rt Breast   Most recent treatment: Dose given: 266 cGy (on 12/17/2024)   Total: Dose given: 1,862 cGy   Elapsed Days: 8                      Physical Examination:  Vitals: Blood pressure 128/84, pulse 97, weight 79.4 kg (175 lb), SpO2 97%, not currently breastfeeding.  Pain Score    12/18/24 1443   PainSc: 0-No pain       Fully active, able to carry on all pre-disease performance without restriction = 0    We examined the relevant areas: yes  Findings are within the expected range for this stage of treatment: yes  -------------------------------------------------------------------------------------------------------------------    ACTION ITEMS:  Patient tolerating treatment well and as expected for this stage in their treatment    Gabriella Alvarado MD  Radiation Oncology

## 2024-12-19 ENCOUNTER — HOSPITAL ENCOUNTER (OUTPATIENT)
Dept: RADIATION ONCOLOGY | Facility: HOSPITAL | Age: 74
Discharge: HOME | End: 2024-12-19

## 2024-12-19 LAB
RAD ONC ARIA COURSE ID: NORMAL
RAD ONC ARIA COURSE INTENT: NORMAL
RAD ONC ARIA COURSE LAST TREATMENT DATE: NORMAL
RAD ONC ARIA COURSE START DATE: NORMAL
RAD ONC ARIA COURSE TREATMENT ELAPSED DAYS: 10
RAD ONC ARIA FIRST TREATMENT DATE: NORMAL
RAD ONC ARIA PLAN FRACTIONS TREATED TO DATE: 9
RAD ONC ARIA PLAN ID: NORMAL
RAD ONC ARIA PLAN PRESCRIBED DOSE PER FRACTION: 2.66 GY
RAD ONC ARIA PLAN PRIMARY REFERENCE POINT: NORMAL
RAD ONC ARIA PLAN TOTAL FRACTIONS PRESCRIBED: 15
RAD ONC ARIA PLAN TOTAL PRESCRIBED DOSE: 3990 CGY
RAD ONC ARIA REFERENCE POINT DOSAGE GIVEN TO DATE: 23.94 GY
RAD ONC ARIA REFERENCE POINT ID: NORMAL
RAD ONC ARIA REFERENCE POINT SESSION DOSAGE GIVEN: 2.66 GY

## 2024-12-19 PROCEDURE — 77412 RADIATION TX DELIVERY LVL 3: CPT | Performed by: RADIOLOGY

## 2024-12-20 ENCOUNTER — HOSPITAL ENCOUNTER (OUTPATIENT)
Dept: RADIATION ONCOLOGY | Facility: HOSPITAL | Age: 74
Discharge: HOME | End: 2024-12-20

## 2024-12-20 LAB
RAD ONC ARIA COURSE ID: NORMAL
RAD ONC ARIA COURSE INTENT: NORMAL
RAD ONC ARIA COURSE LAST TREATMENT DATE: NORMAL
RAD ONC ARIA COURSE START DATE: NORMAL
RAD ONC ARIA COURSE TREATMENT ELAPSED DAYS: 11
RAD ONC ARIA FIRST TREATMENT DATE: NORMAL
RAD ONC ARIA PLAN FRACTIONS TREATED TO DATE: 10
RAD ONC ARIA PLAN ID: NORMAL
RAD ONC ARIA PLAN PRESCRIBED DOSE PER FRACTION: 2.66 GY
RAD ONC ARIA PLAN PRIMARY REFERENCE POINT: NORMAL
RAD ONC ARIA PLAN TOTAL FRACTIONS PRESCRIBED: 15
RAD ONC ARIA PLAN TOTAL PRESCRIBED DOSE: 3990 CGY
RAD ONC ARIA REFERENCE POINT DOSAGE GIVEN TO DATE: 26.6 GY
RAD ONC ARIA REFERENCE POINT ID: NORMAL
RAD ONC ARIA REFERENCE POINT SESSION DOSAGE GIVEN: 2.66 GY

## 2024-12-20 PROCEDURE — 77412 RADIATION TX DELIVERY LVL 3: CPT | Performed by: STUDENT IN AN ORGANIZED HEALTH CARE EDUCATION/TRAINING PROGRAM

## 2024-12-23 ENCOUNTER — PATIENT OUTREACH (OUTPATIENT)
Dept: OTHER | Facility: HOSPITAL | Age: 74
End: 2024-12-23
Payer: MEDICARE

## 2024-12-23 ENCOUNTER — HOSPITAL ENCOUNTER (OUTPATIENT)
Dept: RADIATION ONCOLOGY | Facility: HOSPITAL | Age: 74
Discharge: HOME | End: 2024-12-23
Payer: MEDICARE

## 2024-12-23 LAB
RAD ONC ARIA COURSE ID: NORMAL
RAD ONC ARIA COURSE INTENT: NORMAL
RAD ONC ARIA COURSE LAST TREATMENT DATE: NORMAL
RAD ONC ARIA COURSE START DATE: NORMAL
RAD ONC ARIA COURSE TREATMENT ELAPSED DAYS: 14
RAD ONC ARIA FIRST TREATMENT DATE: NORMAL
RAD ONC ARIA PLAN FRACTIONS TREATED TO DATE: 11
RAD ONC ARIA PLAN ID: NORMAL
RAD ONC ARIA PLAN PRESCRIBED DOSE PER FRACTION: 2.66 GY
RAD ONC ARIA PLAN PRIMARY REFERENCE POINT: NORMAL
RAD ONC ARIA PLAN TOTAL FRACTIONS PRESCRIBED: 15
RAD ONC ARIA PLAN TOTAL PRESCRIBED DOSE: 3990 CGY
RAD ONC ARIA REFERENCE POINT DOSAGE GIVEN TO DATE: 29.26 GY
RAD ONC ARIA REFERENCE POINT ID: NORMAL
RAD ONC ARIA REFERENCE POINT SESSION DOSAGE GIVEN: 2.66 GY

## 2024-12-23 PROCEDURE — 77412 RADIATION TX DELIVERY LVL 3: CPT | Performed by: RADIOLOGY

## 2024-12-23 PROCEDURE — 77427 RADIATION TX MANAGEMENT X5: CPT | Performed by: RADIOLOGY

## 2024-12-23 NOTE — PROGRESS NOTES
Called MsMerle Dulce Maria to see how she was doing. She stated she is tolerating radiation well and has no needs at this time. She was thankful for the call and will reach out if any questions or needs arise.

## 2024-12-26 ENCOUNTER — HOSPITAL ENCOUNTER (OUTPATIENT)
Dept: RADIATION ONCOLOGY | Facility: HOSPITAL | Age: 74
Discharge: HOME | End: 2024-12-26

## 2024-12-26 LAB
RAD ONC ARIA COURSE ID: NORMAL
RAD ONC ARIA COURSE INTENT: NORMAL
RAD ONC ARIA COURSE LAST TREATMENT DATE: NORMAL
RAD ONC ARIA COURSE START DATE: NORMAL
RAD ONC ARIA COURSE TREATMENT ELAPSED DAYS: 17
RAD ONC ARIA FIRST TREATMENT DATE: NORMAL
RAD ONC ARIA PLAN FRACTIONS TREATED TO DATE: 12
RAD ONC ARIA PLAN ID: NORMAL
RAD ONC ARIA PLAN PRESCRIBED DOSE PER FRACTION: 2.66 GY
RAD ONC ARIA PLAN PRIMARY REFERENCE POINT: NORMAL
RAD ONC ARIA PLAN TOTAL FRACTIONS PRESCRIBED: 15
RAD ONC ARIA PLAN TOTAL PRESCRIBED DOSE: 3990 CGY
RAD ONC ARIA REFERENCE POINT DOSAGE GIVEN TO DATE: 31.92 GY
RAD ONC ARIA REFERENCE POINT ID: NORMAL
RAD ONC ARIA REFERENCE POINT SESSION DOSAGE GIVEN: 2.66 GY

## 2024-12-26 PROCEDURE — 77412 RADIATION TX DELIVERY LVL 3: CPT | Performed by: RADIOLOGY

## 2024-12-26 PROCEDURE — 77417 THER RADIOLOGY PORT IMAGE(S): CPT | Performed by: RADIOLOGY

## 2024-12-27 ENCOUNTER — HOSPITAL ENCOUNTER (OUTPATIENT)
Dept: RADIATION ONCOLOGY | Facility: HOSPITAL | Age: 74
Discharge: HOME | End: 2024-12-27

## 2024-12-27 ENCOUNTER — RADIATION ONCOLOGY WEEKLY ASSESSMENT (OUTPATIENT)
Dept: RADIATION ONCOLOGY | Facility: HOSPITAL | Age: 74
End: 2024-12-27
Payer: MEDICARE

## 2024-12-27 VITALS
HEART RATE: 90 BPM | OXYGEN SATURATION: 98 % | SYSTOLIC BLOOD PRESSURE: 143 MMHG | WEIGHT: 177 LBS | BODY MASS INDEX: 32.12 KG/M2 | DIASTOLIC BLOOD PRESSURE: 81 MMHG

## 2024-12-27 DIAGNOSIS — C50.411 MALIGNANT NEOPLASM OF UPPER-OUTER QUADRANT OF RIGHT BREAST IN FEMALE, ESTROGEN RECEPTOR POSITIVE: Primary | ICD-10-CM

## 2024-12-27 DIAGNOSIS — Z17.0 MALIGNANT NEOPLASM OF UPPER-OUTER QUADRANT OF RIGHT BREAST IN FEMALE, ESTROGEN RECEPTOR POSITIVE: Primary | ICD-10-CM

## 2024-12-27 LAB
RAD ONC ARIA COURSE ID: NORMAL
RAD ONC ARIA COURSE INTENT: NORMAL
RAD ONC ARIA COURSE LAST TREATMENT DATE: NORMAL
RAD ONC ARIA COURSE START DATE: NORMAL
RAD ONC ARIA COURSE TREATMENT ELAPSED DAYS: 18
RAD ONC ARIA FIRST TREATMENT DATE: NORMAL
RAD ONC ARIA PLAN FRACTIONS TREATED TO DATE: 13
RAD ONC ARIA PLAN ID: NORMAL
RAD ONC ARIA PLAN PRESCRIBED DOSE PER FRACTION: 2.66 GY
RAD ONC ARIA PLAN PRIMARY REFERENCE POINT: NORMAL
RAD ONC ARIA PLAN TOTAL FRACTIONS PRESCRIBED: 15
RAD ONC ARIA PLAN TOTAL PRESCRIBED DOSE: 3990 CGY
RAD ONC ARIA REFERENCE POINT DOSAGE GIVEN TO DATE: 34.58 GY
RAD ONC ARIA REFERENCE POINT ID: NORMAL
RAD ONC ARIA REFERENCE POINT SESSION DOSAGE GIVEN: 2.66 GY

## 2024-12-27 PROCEDURE — 77412 RADIATION TX DELIVERY LVL 3: CPT | Performed by: RADIOLOGY

## 2024-12-27 PROCEDURE — 77336 RADIATION PHYSICS CONSULT: CPT | Performed by: RADIOLOGY

## 2024-12-27 NOTE — PROGRESS NOTES
Radiation Oncology  On-Treatment Note      Patient: Verito العراقي    MRN: [id number]    Attending Physician: Gabriella Alvarado MD     Diagnosis:     ICD-10-CM ICD-9-CM   1. Malignant neoplasm of upper-outer quadrant of right breast in female, estrogen receptor positive  C50.411 174.4    Z17.0 V86.0       Radiation Therapy Visit:  Continue radiation therapy, Dosimetry plan remains acceptable, Films reviewed and remains acceptable, Pain assessed, Pain management planned, Radiation dose schedule reviewed and remains acceptable, Radiation technique remains acceptable, and Symptoms within expected range    Radiation Treatments       Active   Plans   Rt Breast   Most recent treatment: Dose planned: 266 cGy (fraction 13 on 12/27/2024)   Total: Dose planned: 3,990 cGy (15 fractions)   Elapsed Days: 18      Reference Points   Rx Rt Breast   Most recent treatment: Dose given: 266 cGy (on 12/27/2024)   Total: Dose given: 3,458 cGy   Elapsed Days: 18                      Physical Examination:  Vitals: Blood pressure 143/81, pulse 90, weight 80.3 kg (177 lb), SpO2 98%, not currently breastfeeding.  Pain Score    12/27/24 1446   PainSc: 0-No pain       Fully active, able to carry on all pre-disease performance without restriction = 0    We examined the relevant areas: yes  Findings are within the expected range for this stage of treatment: yes  -------------------------------------------------------------------------------------------------------------------    ACTION ITEMS:  Patient tolerating treatment well and as expected for this stage in their treatment      Gabriella Alvarado MD  Radiation Oncology

## 2024-12-30 ENCOUNTER — HOSPITAL ENCOUNTER (OUTPATIENT)
Dept: RADIATION ONCOLOGY | Facility: HOSPITAL | Age: 74
Discharge: HOME | End: 2024-12-30
Payer: MEDICARE

## 2024-12-30 LAB
RAD ONC ARIA COURSE ID: NORMAL
RAD ONC ARIA COURSE INTENT: NORMAL
RAD ONC ARIA COURSE LAST TREATMENT DATE: NORMAL
RAD ONC ARIA COURSE START DATE: NORMAL
RAD ONC ARIA COURSE TREATMENT ELAPSED DAYS: 21
RAD ONC ARIA FIRST TREATMENT DATE: NORMAL
RAD ONC ARIA PLAN FRACTIONS TREATED TO DATE: 14
RAD ONC ARIA PLAN ID: NORMAL
RAD ONC ARIA PLAN PRESCRIBED DOSE PER FRACTION: 2.66 GY
RAD ONC ARIA PLAN PRIMARY REFERENCE POINT: NORMAL
RAD ONC ARIA PLAN TOTAL FRACTIONS PRESCRIBED: 15
RAD ONC ARIA PLAN TOTAL PRESCRIBED DOSE: 3990 CGY
RAD ONC ARIA REFERENCE POINT DOSAGE GIVEN TO DATE: 37.24 GY
RAD ONC ARIA REFERENCE POINT ID: NORMAL
RAD ONC ARIA REFERENCE POINT SESSION DOSAGE GIVEN: 2.66 GY

## 2024-12-30 PROCEDURE — 77412 RADIATION TX DELIVERY LVL 3: CPT | Performed by: RADIOLOGY

## 2024-12-31 ENCOUNTER — HOSPITAL ENCOUNTER (OUTPATIENT)
Dept: RADIATION ONCOLOGY | Facility: HOSPITAL | Age: 74
Discharge: HOME | End: 2024-12-31

## 2024-12-31 LAB
RAD ONC ARIA COURSE ID: NORMAL
RAD ONC ARIA COURSE INTENT: NORMAL
RAD ONC ARIA COURSE LAST TREATMENT DATE: NORMAL
RAD ONC ARIA COURSE START DATE: NORMAL
RAD ONC ARIA COURSE TREATMENT ELAPSED DAYS: 22
RAD ONC ARIA FIRST TREATMENT DATE: NORMAL
RAD ONC ARIA PLAN FRACTIONS TREATED TO DATE: 15
RAD ONC ARIA PLAN ID: NORMAL
RAD ONC ARIA PLAN PRESCRIBED DOSE PER FRACTION: 2.66 GY
RAD ONC ARIA PLAN PRIMARY REFERENCE POINT: NORMAL
RAD ONC ARIA PLAN TOTAL FRACTIONS PRESCRIBED: 15
RAD ONC ARIA PLAN TOTAL PRESCRIBED DOSE: 3990 CGY
RAD ONC ARIA REFERENCE POINT DOSAGE GIVEN TO DATE: 39.9 GY
RAD ONC ARIA REFERENCE POINT ID: NORMAL
RAD ONC ARIA REFERENCE POINT SESSION DOSAGE GIVEN: 2.66 GY

## 2024-12-31 PROCEDURE — 77412 RADIATION TX DELIVERY LVL 3: CPT | Performed by: RADIOLOGY

## 2024-12-31 PROCEDURE — 77417 THER RADIOLOGY PORT IMAGE(S): CPT | Performed by: RADIOLOGY

## 2025-01-02 DIAGNOSIS — Z17.0 MALIGNANT NEOPLASM OF UPPER-OUTER QUADRANT OF RIGHT BREAST IN FEMALE, ESTROGEN RECEPTOR POSITIVE: Primary | ICD-10-CM

## 2025-01-02 DIAGNOSIS — C50.411 MALIGNANT NEOPLASM OF UPPER-OUTER QUADRANT OF RIGHT BREAST IN FEMALE, ESTROGEN RECEPTOR POSITIVE: Primary | ICD-10-CM

## 2025-01-02 LAB
RAD ONC ARIA COURSE END DATE: NORMAL
RAD ONC ARIA COURSE ID: NORMAL
RAD ONC ARIA COURSE INTENT: NORMAL
RAD ONC ARIA COURSE LAST TREATMENT DATE: NORMAL
RAD ONC ARIA COURSE START DATE: NORMAL
RAD ONC ARIA COURSE TREATMENT ELAPSED DAYS: 22
RAD ONC ARIA FIRST TREATMENT DATE: NORMAL
RAD ONC ARIA PLAN FRACTIONS TREATED TO DATE: 15
RAD ONC ARIA PLAN ID: NORMAL
RAD ONC ARIA PLAN NAME: NORMAL
RAD ONC ARIA PLAN PRESCRIBED DOSE PER FRACTION: 2.66 GY
RAD ONC ARIA PLAN PRIMARY REFERENCE POINT: NORMAL
RAD ONC ARIA PLAN TOTAL FRACTIONS PRESCRIBED: 15
RAD ONC ARIA PLAN TOTAL PRESCRIBED DOSE: 3990 CGY
RAD ONC ARIA REFERENCE POINT DOSAGE GIVEN TO DATE: 39.9 GY
RAD ONC ARIA REFERENCE POINT ID: NORMAL

## 2025-01-07 ENCOUNTER — TELEPHONE (OUTPATIENT)
Dept: OTHER | Facility: HOSPITAL | Age: 75
End: 2025-01-07
Payer: MEDICARE

## 2025-01-15 ENCOUNTER — TELEPHONE (OUTPATIENT)
Dept: OTHER | Facility: HOSPITAL | Age: 75
End: 2025-01-15
Payer: MEDICARE

## 2025-01-15 NOTE — TELEPHONE ENCOUNTER
[facility] MULTIDISCIPLINARY [facility]INIC  SURVIVORSHIP SERVICES CARE COORDINATION NOTE  [facility]     MAILED PT INFORMATION        Patient mailed survivorship program information.

## 2025-01-30 ENCOUNTER — DOCUMENTATION (OUTPATIENT)
Dept: RADIATION ONCOLOGY | Facility: HOSPITAL | Age: 75
End: 2025-01-30
Payer: MEDICARE

## 2025-01-30 DIAGNOSIS — C50.411 MALIGNANT NEOPLASM OF UPPER-OUTER QUADRANT OF RIGHT BREAST IN FEMALE, ESTROGEN RECEPTOR POSITIVE: Primary | ICD-10-CM

## 2025-01-30 DIAGNOSIS — Z17.0 MALIGNANT NEOPLASM OF UPPER-OUTER QUADRANT OF RIGHT BREAST IN FEMALE, ESTROGEN RECEPTOR POSITIVE: Primary | ICD-10-CM

## 2025-01-30 NOTE — PROGRESS NOTES
Radiation Treatment Summary Note      Patient Name: Verito العراقي  : 1950    Attending Provider: Gabriella Alvarado MD      Diagnosis:     ICD-10-CM ICD-9-CM   1. Malignant neoplasm of upper-outer quadrant of right breast in female, estrogen receptor positive  C50.411 174.4    Z17.0 V86.0     Site: right breast    Radiation Start Date: 24    Radiation Completion Date: 24      Prescription:     She received a dose of [id number] in 15 fractions to the right breast using tangential photon fields.    Final Delivered Dose Deviated From Initially Prescribed Dose: No    Concurrent Chemotherapy: No    Patient Tolerated Treatment Without Unexpected Side Effects/Complications: Yes    ECOG: Fully active, able to carry on all pre-disease performance without restriction = 0    Pain Management Plan: None Indicated/PRN OTC    Follow-Up Plan: 3 months    Imaging Ordered for Follow-Up: None/NA        Gabriella Alvarado MD   Yes

## 2025-02-17 DIAGNOSIS — F41.8 ANXIETY ASSOCIATED WITH DEPRESSION: ICD-10-CM

## 2025-02-17 RX ORDER — BUSPIRONE HYDROCHLORIDE 5 MG/1
5 TABLET ORAL 3 TIMES DAILY
Qty: 270 TABLET | Refills: 0 | Status: SHIPPED | OUTPATIENT
Start: 2025-02-17

## 2025-02-17 NOTE — TELEPHONE ENCOUNTER
Rx Refill Note  Requested Prescriptions     Pending Prescriptions Disp Refills    busPIRone (BUSPAR) 5 MG tablet [Pharmacy Med Name: BUSPIRONE HCL TABS 5MG] 270 tablet 3     Sig: TAKE 1 TABLET THREE TIMES A DAY      Last office visit with prescribing clinician: 8/7/2023   Last telemedicine visit with prescribing clinician: Visit date not found   Next office visit with prescribing clinician: Visit date not found                         Would you like a call back once the refill request has been completed: [] Yes [] No    If the office needs to give you a call back, can they leave a voicemail: [] Yes [] No    Anselmo Woodard CMA/LMR  02/17/25, 07:32 EST

## 2025-02-26 ENCOUNTER — TELEMEDICINE (OUTPATIENT)
Dept: ONCOLOGY | Facility: CLINIC | Age: 75
End: 2025-02-26
Payer: MEDICARE

## 2025-02-26 DIAGNOSIS — Z17.0 MALIGNANT NEOPLASM OF UPPER-OUTER QUADRANT OF RIGHT BREAST IN FEMALE, ESTROGEN RECEPTOR POSITIVE: Primary | ICD-10-CM

## 2025-02-26 DIAGNOSIS — N60.92 ATYPICAL LOBULAR HYPERPLASIA (ALH) OF LEFT BREAST: ICD-10-CM

## 2025-02-26 DIAGNOSIS — C50.411 MALIGNANT NEOPLASM OF UPPER-OUTER QUADRANT OF RIGHT BREAST IN FEMALE, ESTROGEN RECEPTOR POSITIVE: Primary | ICD-10-CM

## 2025-02-26 NOTE — PROGRESS NOTES
Subjective   Verito العراقي is a 75 y.o. female.  Referred by Dr. Mosley for right breast invasive ductal carcinoma    History of Present Illness     Ms. 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  Patient had bilateral prepectoral saline implants and a few punctate calcifications seen in the anterior one third of the left breast.  3 o'clock position.    Left breast diagnostic mammogram 3/20/2024-amorphus calcifications with grouped distribution in the anterior one third region of the left breast at 3:00.    4/15/2024-left breast 3:00 stereotactic biopsy-atypical lobular hyperplasia    6/10/2024-bilateral breast MRI-no suspicious enhancement in the left breast at the 2 o'clock position at the site of atypical lobular hyperplasia.  Suspicious 1.2 cm enhancing mass at 9 o'clock position of the right breast with a possible mammographic correlate and suspicious 1 cm enhancing mass at 8:00 of the right breast.  Further evaluation with diagnostic mammogram and ultrasound and possible stereotactic or ultrasound-guided biopsy recommended.    Right breast diagnostic mammogram and ultrasound 7/9/2024-More anterior 1.2 cm mass in the posterior depth of the outer right breast definitely seen on the implant displaced extended cc view.  Masses are otherwise likely obscured by the breast implant.  Ultrasound showed a 9 o'clock position right breast mass, 8 cm from the nipple measuring 1.2 x 0.9 x 3.5 cm.  At the 9:30 position, 9 cm from the nipple there is a 0.6 x 0.6 time 0.5 cm mass.  Impression-2 small masses seen on the prior MRI study in the lateral right breast superficial to the breast implant are visualized on the ultrasound.  Ultrasound-guided biopsy recommended.    7/25/2024-right breast 10:00 ultrasound-guided biopsy-invasive ductal carcinoma, grade 2 7 mm, no DCIS or lymphovascular space invasion.  ER +91 to 100%, NE +41 to  50%, HER2 negative, Ki-67 40%    Right breast 9:30 position ultrasound-guided biopsy-invasive ductal carcinoma, grade 2, 8 mm, rare focus of DCIS, no lymphovascular space invasion  ER +91 to 100%, HI +91 to 100%, HER2 negative, Ki-67 15%    9/12/2024-left breast excisional biopsy and right breast lumpectomy and right sentinel lymph node biopsy  1.left breast excisional biopsy-radial scar, focal fibroadenomatous hyperplasia, fibrocystic change with clustered ductal and apocrine cyst.  No evidence of malignancy.    2.right breast partial mastectomy-multifocal invasive ductal carcinoma  1A-invasive ductal carcinoma measuring 11 x 10 x 5 mm, grade 2, no lymphovascular space invasion, focal associated low-grade DCIS    2A-invasive ductal carcinoma measuring 12 x 11 x 10 mm, grade 2  No lymphovascular space invasion, no definite associated DCIS    The tumors are ER/HI strongly positive and HER2 1+ on immunohistochemistry.    Oncotype DX recurrence score on the 12 mm tumor returned at 12 with a less than 1% benefit from chemotherapy and 3% risk of distant metastasis at 9 years.    Patient denies any family history of breast cancer.    Comorbidities include hyperlipidemia and anxiety.    Completed adjuvant radiation to the right breast in December 2024.    Started anastrozole in December 2024.    Patient reports tolerating anastrozole well.  She denies any new complaints at this time.  In the right breast at the site of surgery there is some scar tissue but other than that no other new palpable abnormalities.  Dr. Mosley has ordered a screening mammogram and she is scheduled to see Dr. Mosley in March 2025.    DEXA scan performed October 2024 shows normal bone density        The following portions of the patient's history were reviewed and updated as appropriate: allergies, current medications, past family history, past medical history, past social history, past surgical history, and problem list.    Past Medical History:    Diagnosis Date    Allergic rhinitis     Anxiety     Breast cancer, right     Colon polyp 10/31/2017    , ascending colon tubular adenoma    Hemorrhoids     History of bone density study 08/2011    nl    History of bone density study     nl 06/05/2017    History of mitral valve prolapse     she was told this in the psat, but she had a normal echo in 2016    Hyperlipidemia     Hyperplastic colon polyp     04/12 hyperplastic rectal and colon polyps,         Past Surgical History:   Procedure Laterality Date    BREAST LUMPECTOMY WITH SENTINEL NODE BIOPSY Bilateral 9/12/2024    Procedure: Right breast partial mastectomy, NACHO guided to 2 sites.  Right sentinel lymph node biopsy.  Left NACHO guided excisional biopsy;  Surgeon: Marilia Mosley MD;  Location: [facility] MAIN OR;  Service: General;  Laterality: Bilateral;    BREAST SURGERY Bilateral 9/12/2024    Procedure: RIGHT ONCOPLASTIC AND LEFT MASTOPEXY FOR SYMMETRY, BILATERAL REMOVAL OF BREAST IMPLANTS;  Surgeon: Wayne Sanchez MD;  Location: [facility] MAIN OR;  Service: Plastics;  Laterality: Bilateral;    COLONOSCOPY W/ POLYPECTOMY  10/31/2017    , ascending colon 3 mm 10/31/2017, sigmoid colon (3mm) - hyperplastic polyp  04/04/2012        Family History   Problem Relation Age of Onset    Diabetes Father     Heart disease Father     Prostate cancer Father     Macular degeneration Mother     Hypertension Mother     Dementia Mother     Heart disease Paternal Grandfather     Heart disease Maternal Grandmother     Malig Hyperthermia Neg Hx     Breast cancer Neg Hx     Ovarian cancer Neg Hx     Uterine cancer Neg Hx     Colon cancer Neg Hx         Social History     Socioeconomic History    Marital status:     Number of children: 3   Tobacco Use    Smoking status: Never     Passive exposure: Never    Smokeless tobacco: Never   Vaping Use    Vaping status: Never Used   Substance and Sexual Activity    Alcohol use: Yes     Alcohol/week: 7.0  standard drinks of alcohol     Types: 7 Glasses of wine per week     Comment: social    Drug use: No    Sexual activity: Yes     Partners: Male     Birth control/protection: Post-menopausal        OB History          3    Para   3    Term   3            AB        Living   3         SAB        IAB        Ectopic        Molar        Multiple        Live Births   3            Age at menarche-12  Age at first live childbirth-20   3 para 3  0  Breast-feeding-1 month  Age at menopause-40s  No use of hormone replacement therapy.    Allergies   Allergen Reactions    Ciprofloxacin Other (See Comments)     UNSURE OF RX            Review of Systems   Constitutional: Negative.    HENT: Negative.     Eyes: Negative.    Respiratory: Negative.     Cardiovascular: Negative.    Gastrointestinal: Negative.    Endocrine: Negative.    Genitourinary:  Positive for amenorrhea.   Neurological: Negative.    Psychiatric/Behavioral:  The patient is nervous/anxious.          Objective   not currently breastfeeding.   Physical Exam  Vitals reviewed.   Constitutional:       Appearance: Normal appearance. She is normal weight.   HENT:      Mouth/Throat:      Pharynx: Oropharynx is clear.   Eyes:      Conjunctiva/sclera: Conjunctivae normal.   Cardiovascular:      Rate and Rhythm: Normal rate.   Pulmonary:      Effort: Pulmonary effort is normal.   Musculoskeletal:         General: Normal range of motion.      Cervical back: Normal range of motion.   Neurological:      General: No focal deficit present.      Mental Status: She is alert.   Psychiatric:         Mood and Affect: Mood normal.         Behavior: Behavior normal.         Thought Content: Thought content normal.         Judgment: Judgment normal.       Breast exam not performed today.    No visits with results within 30 Day(s) from this visit.   Latest known visit with results is:   Orders Only on 2025   Component Date Value Ref Range Status    Course  ID 01/02/2025 C1 Rt Breast   Final    Course Intent 01/02/2025 Curative   Final    Course Start Date 01/02/2025 12/2/2024 11:14 AM   Final    Course End Date 01/02/2025 1/2/2025 10:17 AM   Final    Course First Treatment Date 01/02/2025 12/9/2024  2:18 PM   Final    Course Last Treatment Date 01/02/2025 12/31/2024  2:41 PM   Final    Course Elapsed Days 01/02/2025 22   Final    Reference Point ID 01/02/2025 Rx Rt Breast   Final    Reference Point Dosage Given to Da* 01/02/2025 [id number]  Gy Final    Plan ID 01/02/2025 Rt Breast   Final    Plan Name 01/02/2025 Rt Breast   Final    Plan Fractions Treated to Date 01/02/2025 15   Final    Plan Total Fractions Prescribed 01/02/2025 15   Final    Plan Prescribed Dose Per Fraction 01/02/2025 2.66  Gy Final    Plan Total Prescribed Dose 01/02/2025 3,990  cGy Final    Plan Primary Reference Point 01/02/2025 Rx Rt Breast   Final        No radiology results for the last 30 days.       Assessment & Plan       *Right breast invasive ductal carcinoma  2 foci of invasive ductal carcinoma, 1 focus measures 12 mm in second focus measures 11 mm, both are grade 2, ER/OH strongly positive  Status post right breast partial mastectomy on 9/12/2024  Oncotype DX recurrence score of the 12 mm tumor returned 12 with no additional benefit from chemotherapy and 3% risk of distant metastasis.  Patient has met with Dr. Gabriella Hartman and plans to initiate radiation in December.  She is going on trip to [place] in November and hence wants to wait on the radiation till she returns.  We discussed the fact that there are 2 tumors and we only have Oncotype DX recurrence score on one of them.  Recommended that she proceed with Oncotype DX recurrence score on the second tumor however patient expressed that she does not wish to proceed with any chemotherapy even if the Oncotype DX recurrence score is high.  Due to this reason we will not obtain the second Oncotype DX recurrence score.  After  completion of radiation recommend that she proceed with endocrine therapy.  Radiation completed 12/31/2024, 3990 cGy in 15 fractions to the right breast  Anastrozole started December 2025  Patient is tolerating anastrozole extremely well.  Continue at least 5 years of endocrine therapy and will discuss obtaining BCI at the end of 5 years to decide on extended endocrine therapy.    *Left breast atypical lobular hyperplasia-patient will be started on endocrine therapy after completion of radiation.    *Bone health  10/28/2024-DEXA scan shows normal bone density  Continue daily vitamin D    *Hyperlipidemia-continue atorvastatin    *Anxiety-continue BuSpar  Anxiety well-controlled at this time.    *Surveillance  Screening mammogram due February 2025 however might have to be delayed due to recent completion of radiation in December 2024.  Screening mammogram has been ordered by Dr. Mosley with a follow-up in March 2025 with her.        *Follow-up-4 months with APRN and 8 months with MD    Patient is on medications requiring close monitoring for toxicities    You have chosen to receive care through a telehealth visit.  Do you consent to use a video/audio connection for your medical care today? Yes    Patient was located at her home at the time of video visit and I was located at home at the time of video visit.

## 2025-03-10 ENCOUNTER — TELEPHONE (OUTPATIENT)
Dept: ONCOLOGY | Facility: CLINIC | Age: 75
End: 2025-03-10
Payer: MEDICARE

## 2025-03-10 RX ORDER — ANASTROZOLE 1 MG/1
1 TABLET ORAL DAILY
Qty: 90 TABLET | Refills: 3 | Status: SHIPPED | OUTPATIENT
Start: 2025-03-10

## 2025-03-10 NOTE — TELEPHONE ENCOUNTER
Caller: Verito العراقي    Relationship: Self    Best call back number: [phone]    Who are you requesting to speak with (clinical staff, provider,  specific staff member): CLINICAL    What was the call regarding: PT RECEIVED A LETTER FROM [employer] ASKING FOR A NEW ANASTROZOLE SCRIPT THAT IS FOR 90 DAY SUPPLY RATHER THAN 30.    PT CURRENTLY HAS A 30 DAY SUPPLY BUT WILL NEED THE NEW SCRIPT FOR THE NEXT FILL.

## 2025-03-12 ENCOUNTER — PATIENT OUTREACH (OUTPATIENT)
Dept: OTHER | Facility: HOSPITAL | Age: 75
End: 2025-03-12
Payer: MEDICARE

## 2025-03-12 NOTE — PROGRESS NOTES
Follow up call placed to Ms. العراقي. Left voice mail with my contact information asking that she call back at her convenience.     Chart reviewed for survivorship information. Referral to lymphedema clinic made Sept 2024, patient declined appointment. Screening mammo due Feb 2025. Message sent to Dr. Mosley's office to check on that. She will follow with Dr. Mosley and Dr. Vasquez for future breast cancer care. She had her last colonoscopy 2017, Cervical cancer screening 2013, never smoked.     Will mail information about NCCN recommendations for all cancer survivors for 150 minutes/week moderate intensity exercise, achieve and maintain a healthy weight, plants-based whole-foods diet, avoid tobacco and second hand smoke, avoid alcohol or minimize alcohol intake - no more than 1 drink in a day for adults.

## 2025-03-17 ENCOUNTER — TELEPHONE (OUTPATIENT)
Dept: SURGERY | Facility: CLINIC | Age: 75
End: 2025-03-17
Payer: MEDICARE

## 2025-03-17 NOTE — TELEPHONE ENCOUNTER
Screening mmg at [facility] on 04/16/2025@1:30 arrive at 1:15 bring ins card and ID  Follow up with Dr Mosley 04/23/2025 at 1:45

## 2025-03-21 ENCOUNTER — TELEPHONE (OUTPATIENT)
Dept: SURGERY | Facility: CLINIC | Age: 75
End: 2025-03-21
Payer: MEDICARE

## 2025-03-21 NOTE — TELEPHONE ENCOUNTER
(Patient called- her imaging had been moved to April and then a new appt with [facility] was scheduled for imaging results on 4/23/25. Her old appt on 3/25 was never canceled. Canceling and she will see her in April after her MMG with [facility])

## 2025-04-15 ENCOUNTER — TELEPHONE (OUTPATIENT)
Dept: RADIATION ONCOLOGY | Facility: HOSPITAL | Age: 75
End: 2025-04-15
Payer: MEDICARE

## 2025-04-16 ENCOUNTER — OFFICE VISIT (OUTPATIENT)
Dept: RADIATION ONCOLOGY | Facility: HOSPITAL | Age: 75
End: 2025-04-16
Payer: MEDICARE

## 2025-04-16 ENCOUNTER — APPOINTMENT (OUTPATIENT)
Dept: WOMENS IMAGING | Facility: HOSPITAL | Age: 75
End: 2025-04-16
Payer: MEDICARE

## 2025-04-16 VITALS
DIASTOLIC BLOOD PRESSURE: 82 MMHG | HEART RATE: 79 BPM | OXYGEN SATURATION: 96 % | RESPIRATION RATE: 19 BRPM | WEIGHT: 178 LBS | BODY MASS INDEX: 32.31 KG/M2 | SYSTOLIC BLOOD PRESSURE: 135 MMHG

## 2025-04-16 DIAGNOSIS — C50.411 MALIGNANT NEOPLASM OF UPPER-OUTER QUADRANT OF RIGHT BREAST IN FEMALE, ESTROGEN RECEPTOR POSITIVE: Primary | ICD-10-CM

## 2025-04-16 DIAGNOSIS — Z17.0 MALIGNANT NEOPLASM OF UPPER-OUTER QUADRANT OF RIGHT BREAST IN FEMALE, ESTROGEN RECEPTOR POSITIVE: Primary | ICD-10-CM

## 2025-04-16 PROCEDURE — G0463 HOSPITAL OUTPT CLINIC VISIT: HCPCS | Performed by: RADIOLOGY

## 2025-04-16 PROCEDURE — 77063 BREAST TOMOSYNTHESIS BI: CPT | Performed by: RADIOLOGY

## 2025-04-16 PROCEDURE — 77067 SCR MAMMO BI INCL CAD: CPT | Performed by: RADIOLOGY

## 2025-04-16 NOTE — PROGRESS NOTES
Cancer Staging   Stage IA (cT1c, cN0, cM0, G2, ER+, NM+, HER2-)   CC:[id number] right breast cancer, ER NM Positive Her 2 negative, [id number] s/p lumpectomy with 2 foci of invasive lobular carcinoma.                                 S:    I had the pleasure of seeing Verito العراقي  [facility].  She is a 75 year old female with [id number] right breast cancer, ER NM Positive Her 2 negative, [id number] s/p lumpectomy with 2 foci of invasive lobular carcinoma. She returns today for follow up now 3 months out from completion of her radiation therapy to the right breast.  She completed a dose of 3990cgy in 15 fractions to the right breast on 12/28/24.       She has been doing well since I last saw her.  She is on anastrazole and tolerating well. She states she is having a mammogram later today.     Review of Systems   Constitutional: Negative.    Musculoskeletal:  Positive for arthralgias.   Psychiatric/Behavioral: Negative.         Past Medical History:   Diagnosis Date    Allergic rhinitis     Anxiety     Breast cancer, right     Colon polyp 10/31/2017    , ascending colon tubular adenoma    Hemorrhoids     History of bone density study 08/2011    nl    History of bone density study     nl 06/05/2017    History of mitral valve prolapse     she was told this in the psat, but she had a normal echo in 2016    Hyperlipidemia     Hyperplastic colon polyp     04/12 hyperplastic rectal and colon polyps,          Past Surgical History:   Procedure Laterality Date    BREAST LUMPECTOMY WITH SENTINEL NODE BIOPSY Bilateral 9/12/2024    Procedure: Right breast partial mastectomy, NACHO guided to 2 sites.  Right sentinel lymph node biopsy.  Left NACHO guided excisional biopsy;  Surgeon: Marilia Mosley MD;  Location: [facility];  Service: General;  Laterality: Bilateral;    BREAST SURGERY Bilateral 9/12/2024    Procedure: RIGHT ONCOPLASTIC AND LEFT MASTOPEXY FOR SYMMETRY, BILATERAL REMOVAL OF BREAST IMPLANTS;   Surgeon: Wayne Sanchez MD;  Location: [facility] MAIN OR;  Service: Plastics;  Laterality: Bilateral;    COLONOSCOPY W/ POLYPECTOMY  10/31/2017    , ascending colon 3 mm 10/31/2017, sigmoid colon (3mm) - hyperplastic polyp  04/04/2012         Social History     Socioeconomic History    Marital status:      Spouse name: Pawel    Number of children: 3   Tobacco Use    Smoking status: Never     Passive exposure: Never    Smokeless tobacco: Never   Vaping Use    Vaping status: Never Used   Substance and Sexual Activity    Alcohol use: Yes     Alcohol/week: 7.0 standard drinks of alcohol     Types: 7 Glasses of wine per week     Comment: social    Drug use: No    Sexual activity: Yes     Partners: Male     Birth control/protection: Post-menopausal         Family History   Problem Relation Age of Onset    Diabetes Father     Heart disease Father     Prostate cancer Father     Macular degeneration Mother     Hypertension Mother     Dementia Mother     Heart disease Paternal Grandfather     Heart disease Maternal Grandmother     Malig Hyperthermia Neg Hx     Breast cancer Neg Hx     Ovarian cancer Neg Hx     Uterine cancer Neg Hx     Colon cancer Neg Hx           Objective    Physical Exam  Constitutional:       Appearance: Normal appearance.   Chest:      Comments: No palpable masses in either breast or axilla  Neurological:      Mental Status: She is alert.   Psychiatric:         Mood and Affect: Mood normal.         Behavior: Behavior normal.         Current Outpatient Medications on File Prior to Visit   Medication Sig Dispense Refill    anastrozole (ARIMIDEX) 1 MG tablet Take 1 tablet by mouth Daily. 90 tablet 3    atorvastatin (LIPITOR) 20 MG tablet TAKE 1 TABLET DAILY 90 tablet 3    busPIRone (BUSPAR) 5 MG tablet TAKE 1 TABLET THREE TIMES A  tablet 0    Cetirizine HCl (ZYRTEC PO) Take 10 mg by mouth Daily.      fluticasone (FLONASE) 50 MCG/ACT nasal spray 2 sprays by Each Nare route Daily.  (Patient taking differently: 1 spray by Each Nare route Daily.) 16 mL 11     No current facility-administered medications on file prior to visit.       ALLERGIES:    Allergies   Allergen Reactions    Ciprofloxacin Other (See Comments)     UNSURE OF RX       /82   Pulse 79   Resp 19   Wt 80.7 kg (178 lb)   SpO2 96%   BMI 32.31 kg/m²          10/15/2024     9:43 AM   Current Status   ECOG score 0         Assessment & Plan     74 year old female with [id number] right breast cancer, ER FL Positive Her 2 negative, [id number] s/p lumpectomy with 2 foci of invasive lobular carcinoma. She is now 3 months out from radiation.  She is having a mammogram later today and follow up with  shortly after.  She will have regular follow up with her medical oncologist.  I will see her back in one year for follow up.  She knows to call for this appointment    I personally spent greater than 25 minutes today assessing, managing, discussing and documenting my visit with the patient. That time includes review of records, imaging and pathology reports, obtaining my own history, performing a medically appropriate evaluation, counseling and educating the patient, discussing goals, logistics, alternatives and risks of my recommendations, surveillance options and potential outcomes. It also includes the time documenting the clinical information in the EMR and communicating my recommendations to the other involved physicians.   .             Thank you very much for allowing me to participate in the care of this very pleasant patient.    Sincerely,      Gabriella Alvarado MD

## 2025-04-21 DIAGNOSIS — E78.00 PURE HYPERCHOLESTEROLEMIA: ICD-10-CM

## 2025-04-21 RX ORDER — ATORVASTATIN CALCIUM 20 MG/1
20 TABLET, FILM COATED ORAL DAILY
Qty: 90 TABLET | Refills: 3 | OUTPATIENT
Start: 2025-04-21

## 2025-04-22 DIAGNOSIS — E78.00 PURE HYPERCHOLESTEROLEMIA: ICD-10-CM

## 2025-04-22 RX ORDER — ATORVASTATIN CALCIUM 20 MG/1
20 TABLET, FILM COATED ORAL DAILY
Qty: 90 TABLET | Refills: 3 | OUTPATIENT
Start: 2025-04-22

## 2025-04-23 ENCOUNTER — OFFICE VISIT (OUTPATIENT)
Dept: SURGERY | Facility: CLINIC | Age: 75
End: 2025-04-23
Payer: MEDICARE

## 2025-04-23 VITALS
SYSTOLIC BLOOD PRESSURE: 136 MMHG | HEART RATE: 75 BPM | DIASTOLIC BLOOD PRESSURE: 76 MMHG | OXYGEN SATURATION: 98 % | WEIGHT: 177.4 LBS | HEIGHT: 62 IN | BODY MASS INDEX: 32.65 KG/M2

## 2025-04-23 DIAGNOSIS — Z17.0 MALIGNANT NEOPLASM OF UPPER-OUTER QUADRANT OF RIGHT BREAST IN FEMALE, ESTROGEN RECEPTOR POSITIVE: Primary | ICD-10-CM

## 2025-04-23 DIAGNOSIS — C50.411 MALIGNANT NEOPLASM OF UPPER-OUTER QUADRANT OF RIGHT BREAST IN FEMALE, ESTROGEN RECEPTOR POSITIVE: Primary | ICD-10-CM

## 2025-04-23 NOTE — LETTER
April 23, 2025     MICHELLE Campo  [address] [facility] [address]    Patient: Verito العراقي   YOB: 1950   Date of Visit: 4/23/2025     Dear MICHELLE Campo:       Thank you for referring Verito العراقي to me for evaluation. Below are the relevant portions of my assessment and plan of care.    If you have questions, please do not hesitate to call me. I look forward to following Verito along with you.         Sincerely,        Marilia Mosley MD        CC: No Recipients    Marilia Mosley MD  04/23/25 1429  Sign when Signing Visit  Breast Surgery Follow Up Note    Oncologic History:  Verito العراقي is a 75 y.o. 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 Vasquez.  She reports she completed her screening mammogram approximately 1 week prior to this appointment.  No significant joint pains.  Has noted some hair loss.  Some fatigue that seems to be overall improving as she returns to more of her usual activities including daily walks with her .    Past Medical History:   Diagnosis Date    Allergic rhinitis     Anxiety     Breast cancer, right     Colon polyp 10/31/2017    , ascending colon tubular adenoma    Hemorrhoids     History of bone density study 08/2011    nl    History of bone density study     nl 06/05/2017    History of mitral valve prolapse     she was told this in the psat, but she had a normal  echo in 2016    Hyperlipidemia     Hyperplastic colon polyp     04/12 hyperplastic rectal and colon polyps,      Patient Active Problem List   Diagnosis    Malignant neoplasm of upper-outer quadrant of right breast in female, estrogen receptor positive    Atypical lobular hyperplasia (ALH) of left breast     Current Outpatient Medications on File Prior to Visit   Medication Sig Dispense Refill    anastrozole (ARIMIDEX) 1 MG tablet Take 1 tablet by mouth Daily. 90 tablet 3    atorvastatin (LIPITOR) 20 MG tablet TAKE 1 TABLET DAILY 90 tablet 3    busPIRone (BUSPAR) 5 MG tablet TAKE 1 TABLET THREE TIMES A  tablet 0    Cetirizine HCl (ZYRTEC PO) Take 10 mg by mouth Daily.      fluticasone (FLONASE) 50 MCG/ACT nasal spray 2 sprays by Each Nare route Daily. (Patient taking differently: 1 spray by Each Nare route Daily.) 16 mL 11     No current facility-administered medications on file prior to visit.     Allergies   Allergen Reactions    Ciprofloxacin Other (See Comments)     UNSURE OF RX       Past Surgical History:   Procedure Laterality Date    BREAST BIOPSY  08/2024    BREAST LUMPECTOMY WITH SENTINEL NODE BIOPSY Bilateral 09/12/2024    Procedure: Right breast partial mastectomy, NACHO guided to 2 sites.  Right sentinel lymph node biopsy.  Left NACHO guided excisional biopsy;  Surgeon: Marilia Mosley MD;  Location: [facility] OR;  Service: General;  Laterality: Bilateral;    BREAST SURGERY Bilateral 09/12/2024    Procedure: RIGHT ONCOPLASTIC AND LEFT MASTOPEXY FOR SYMMETRY, BILATERAL REMOVAL OF BREAST IMPLANTS;  Surgeon: Wayne Sanchez MD;  Location: [facility];  Service: Plastics;  Laterality: Bilateral;    COLONOSCOPY W/ POLYPECTOMY  10/31/2017    , ascending colon 3 mm 10/31/2017, sigmoid colon (3mm) - hyperplastic polyp  04/04/2012     Social History     Socioeconomic History    Marital status:      Spouse name: Don    Number of children: 3   Tobacco Use    Smoking status: Never      Passive exposure: Never    Smokeless tobacco: Never   Vaping Use    Vaping status: Never Used   Substance and Sexual Activity    Alcohol use: Yes     Alcohol/week: 7.0 standard drinks of alcohol     Types: 7 Glasses of wine per week     Comment: social    Drug use: No    Sexual activity: Yes     Partners: Male     Birth control/protection: Post-menopausal     Family History   Problem Relation Age of Onset    Diabetes Father     Heart disease Father     Prostate cancer Father     Macular degeneration Mother     Hypertension Mother     Dementia Mother     Heart disease Paternal Grandfather     Heart disease Maternal Grandmother     Malig Hyperthermia Neg Hx     Breast cancer Neg Hx     Ovarian cancer Neg Hx     Uterine cancer Neg Hx     Colon cancer Neg Hx         Review of Systems:  CONSTITUTIONAL: No weight loss, fever, chills, weakness or fatigue.  HEENT: Eyes: No visual loss, blurred vision, double vision or yellow sclerae. Ears, Nose, Throat: No hearing loss, sneezing, congestion, runny nose or sore throat.  SKIN: No rash or itching.  CARDIOVASCULAR: No chest pain, chest pressure or chest discomfort. No palpitations or edema.  RESPIRATORY: No shortness of breath, cough or sputum.  GASTROINTESTINAL: No anorexia, nausea, vomiting or diarrhea. No abdominal pain or blood.  GENITOURINARY: No burning on urination  NEUROLOGICAL: No headache, dizziness, syncope, paralysis, ataxia, numbness or tingling in the extremities. No change in bowel or bladder control.  MUSCULOSKELETAL: No muscle, back pain, joint pain or stiffness.  HEMATOLOGIC: No anemia, bleeding or bruising.  LYMPHATICS: No enlarged nodes.  PSYCHIATRIC: No history of depression or anxiety.  ENDOCRINOLOGIC: No reports of sweating, cold or heat intolerance. No polyuria or polydipsia.  ALLERGIES: No history of asthma, hives, eczema or rhinitis.    Physical Exam:  Vitals:    04/23/25 1346   BP: 136/76   Pulse: 75   SpO2: 98%         General: Alert,  cooperative    HEENT: Atraumatic, normocephalic    Oral/Maxillofacial: Moist mucous membranes    Neck: Supple     Lungs: EWOB, clear to auscultation bilaterally     Heart: RRR    Breast: Well-healed bilateral reduction incisions, bilateral breasts examined sitting and supine.  RIGHT-lateral skin thickening and postsurgery seroma with ongoing radiation changes no nipple discharge  LEFT- no masses, skin changes, or nipple discharge    Lymphatics:  Bilateral supraclavicular, cervical, and axillary basins without lymphadneopathy    Abdomen: Soft, non-tender, non-distended    Extremities: normal strength and sensation.  No obvious deformities.     Neuro: alert, normal speech, no focal findings or movement disorder noted     Skin: no lesions or abrasions      Recent Imaging:  ***    Assessment/Plan: Verito العراقي is a 75 y.o.female with h/o Stage IA, multifocal T1c N0, right invasive lobular breast cancer, WILL. Surgery completed: 9/12/2024  - RTC 6 months for clinical breast exam  - Next imaging due 1 year, screening mammogram, will order at her follow-up appointment  - Follows with Dr. Sanchez for oncoplastic closure  - Lymphedema offered and recommended referral, patient declined states having no issues  - Discussed self breast massage for lymphatic changes after radiation and scar massage  - Continue follow-up with Dr. Vasquez for AI      Marilia Mosley MD  Breast Surgical Oncology  I spent 20 minutes caring for Ms. Giordano on this date of service. This time includes time spent by me in the following activities: preparing for the visit, reviewing tests, obtaining and/or reviewing a separately obtained history, performing a medically appropriate examination and/or evaluation , counseling and educating the patient/family/caregiver, ordering medications, tests, or procedures, referring and communicating with other health care professionals , documenting information in the medical record, independently interpreting  results and communicating that information with the patient/family/caregiver, and care coordination.    Return in about 6 months (around 10/23/2025).

## 2025-04-23 NOTE — PROGRESS NOTES
Breast Surgery Follow Up Note    Oncologic History:  Verito العراقي is a 75 y.o. female with the following oncologic history:  Oncology/Hematology History   Malignant neoplasm of upper-outer quadrant of right breast in female, estrogen receptor positive   7/25/2024 Cancer Staged    Staging form: Breast, AJCC 8th Edition  - Clinical stage from 7/25/2024: Stage IA (cT1c, cN0, cM0, G2, ER+, [place]+, HER2-) - Signed by Marilia Mosley MD on 8/1/2024 7/30/2024 Initial Diagnosis    Malignant neoplasm of upper-outer quadrant of right breast in female, estrogen receptor positive          Interval History: Since her last visit she has completed radiation therapy, is on AI under the care of Dr. Vasquez.  She reports she completed her screening mammogram approximately 1 week prior to this appointment.  No significant joint pains.  Has noted some hair loss.  Some fatigue that seems to be overall improving as she returns to more of her usual activities including daily walks with her .    Past Medical History:   Diagnosis Date    Allergic rhinitis     Anxiety     Breast cancer, right     Colon polyp 10/31/2017    , ascending colon tubular adenoma    Hemorrhoids     History of bone density study 08/2011    nl    History of bone density study     nl 06/05/2017    History of mitral valve prolapse     she was told this in the psat, but she had a normal echo in 2016    Hyperlipidemia     Hyperplastic colon polyp     04/12 hyperplastic rectal and colon polyps,      Patient Active Problem List   Diagnosis    Malignant neoplasm of upper-outer quadrant of right breast in female, estrogen receptor positive    Atypical lobular hyperplasia (ALH) of left breast     Current Outpatient Medications on File Prior to Visit   Medication Sig Dispense Refill    anastrozole (ARIMIDEX) 1 MG tablet Take 1 tablet by mouth Daily. 90 tablet 3    atorvastatin (LIPITOR) 20 MG tablet TAKE 1 TABLET DAILY 90 tablet 3    busPIRone (BUSPAR) 5  MG tablet TAKE 1 TABLET THREE TIMES A  tablet 0    Cetirizine HCl (ZYRTEC PO) Take 10 mg by mouth Daily.      fluticasone (FLONASE) 50 MCG/ACT nasal spray 2 sprays by Each Nare route Daily. (Patient taking differently: 1 spray by Each Nare route Daily.) 16 mL 11     No current facility-administered medications on file prior to visit.     Allergies   Allergen Reactions    Ciprofloxacin Other (See Comments)     UNSURE OF RX       Past Surgical History:   Procedure Laterality Date    BREAST BIOPSY  08/2024    BREAST LUMPECTOMY WITH SENTINEL NODE BIOPSY Bilateral 09/12/2024    Procedure: Right breast partial mastectomy, NACHO guided to 2 sites.  Right sentinel lymph node biopsy.  Left NACHO guided excisional biopsy;  Surgeon: Marilia Mosley MD;  Location: [facility];  Service: General;  Laterality: Bilateral;    BREAST SURGERY Bilateral 09/12/2024    Procedure: RIGHT ONCOPLASTIC AND LEFT MASTOPEXY FOR SYMMETRY, BILATERAL REMOVAL OF BREAST IMPLANTS;  Surgeon: Wayne Sanchez MD;  Location: [facility];  Service: Plastics;  Laterality: Bilateral;    COLONOSCOPY W/ POLYPECTOMY  10/31/2017    , ascending colon 3 mm 10/31/2017, sigmoid colon (3mm) - hyperplastic polyp  04/04/2012     Social History     Socioeconomic History    Marital status:      Spouse name: Don    Number of children: 3   Tobacco Use    Smoking status: Never     Passive exposure: Never    Smokeless tobacco: Never   Vaping Use    Vaping status: Never Used   Substance and Sexual Activity    Alcohol use: Yes     Alcohol/week: 7.0 standard drinks of alcohol     Types: 7 Glasses of wine per week     Comment: social    Drug use: No    Sexual activity: Yes     Partners: Male     Birth control/protection: Post-menopausal     Family History   Problem Relation Age of Onset    Diabetes Father     Heart disease Father     Prostate cancer Father     Macular degeneration Mother     Hypertension Mother     Dementia Mother     Heart disease  Paternal Grandfather     Heart disease Maternal Grandmother     Malig Hyperthermia Neg Hx     Breast cancer Neg Hx     Ovarian cancer Neg Hx     Uterine cancer Neg Hx     Colon cancer Neg Hx         Review of Systems:  CONSTITUTIONAL: No weight loss, fever, chills, weakness or fatigue.  HEENT: Eyes: No visual loss, blurred vision, double vision or yellow sclerae. Ears, Nose, Throat: No hearing loss, sneezing, congestion, runny nose or sore throat.  SKIN: No rash or itching.  CARDIOVASCULAR: No chest pain, chest pressure or chest discomfort. No palpitations or edema.  RESPIRATORY: No shortness of breath, cough or sputum.  GASTROINTESTINAL: No anorexia, nausea, vomiting or diarrhea. No abdominal pain or blood.  GENITOURINARY: No burning on urination  NEUROLOGICAL: No headache, dizziness, syncope, paralysis, ataxia, numbness or tingling in the extremities. No change in bowel or bladder control.  MUSCULOSKELETAL: No muscle, back pain, joint pain or stiffness.  HEMATOLOGIC: No anemia, bleeding or bruising.  LYMPHATICS: No enlarged nodes.  PSYCHIATRIC: No history of depression or anxiety.  ENDOCRINOLOGIC: No reports of sweating, cold or heat intolerance. No polyuria or polydipsia.  ALLERGIES: No history of asthma, hives, eczema or rhinitis.    Physical Exam:  Vitals:    04/23/25 1346   BP: 136/76   Pulse: 75   SpO2: 98%         General: Alert, cooperative    HEENT: Atraumatic, normocephalic    Oral/Maxillofacial: Moist mucous membranes    Neck: Supple     Lungs: EWOB, clear to auscultation bilaterally     Heart: RRR    Breast: Well-healed bilateral reduction incisions, bilateral breasts examined sitting and supine.  RIGHT-lateral skin thickening and postsurgery seroma with ongoing radiation changes no nipple discharge  LEFT- no masses, skin changes, or nipple discharge    Lymphatics:  Bilateral supraclavicular, cervical, and axillary basins without lymphadneopathy    Abdomen: Soft, non-tender, non-distended     Extremities: normal strength and sensation.  No obvious deformities.     Neuro: alert, normal speech, no focal findings or movement disorder noted     Skin: no lesions or abrasions      Recent Imagin25 Bilateral Screening Mammogram ([facility])  Scattered areas of fibroglandular density.  There is an area of architectural distortion and post surgical scar in the right breast - compatible with surgical and radiation history.  Left breast no suspicious masses, significant calcifications or other abnormalities are seen.  IMPRESSION  Post surgical scar in the right breast is benign negative   Follow up in 1 year.  BIRADS2      Assessment/Plan: Verito العراقي is a 75 y.o.female with h/o Stage IA, multifocal T1c N0, right invasive lobular breast cancer, WILL. Surgery completed: 2024  - RTC 6 months for clinical breast exam  - Next imaging due 1 year, screening mammogram, will order at her follow-up appointment  - Follows with Dr. Sanchez for oncoplastic closure  - Lymphedema offered and recommended referral, patient declined states having no issues - massage and physical therapy would likely improve her symptoms of right breast thickening after surgery and radiation  - Discussed self breast massage for lymphatic changes after radiation and scar massage  - Continue follow-up with Dr. Vasquez for AI      Marilia Mosley MD  Breast Surgical Oncology  I spent 20 minutes caring for Ms. Giordano on this date of service. This time includes time spent by me in the following activities: preparing for the visit, reviewing tests, obtaining and/or reviewing a separately obtained history, performing a medically appropriate examination and/or evaluation , counseling and educating the patient/family/caregiver, ordering medications, tests, or procedures, referring and communicating with other health care professionals , documenting information in the medical record, independently interpreting results and communicating that  information with the patient/family/caregiver, and care coordination.    Return in about 6 months (around 10/23/2025).

## 2025-04-23 NOTE — LETTER
April 23, 2025     MICHELLE Campo  [address] [facility] [address]    Patient: Verito العراقي   YOB: 1950   Date of Visit: 4/23/2025     Dear MICHELLE Campo:       Thank you for referring Verito العراقي to me for evaluation. Below are the relevant portions of my assessment and plan of care.    If you have questions, please do not hesitate to call me. I look forward to following Verito along with you.         Sincerely,        Marilia Mosley MD        CC: No Recipients    Marilia Mosley MD  04/23/25 2238  Sign when Signing Visit  Breast Surgery Follow Up Note    Oncologic History:  Verito العراقي is a 75 y.o. female with the following oncologic history:  Oncology/Hematology History   Malignant neoplasm of upper-outer quadrant of right breast in female, estrogen receptor positive   7/25/2024 Cancer Staged    Staging form: Breast, AJCC 8th Edition  - Clinical stage from 7/25/2024: Stage IA (cT1c, cN0, cM0, G2, ER+, NJ+, HER2-) - Signed by Marilia Mosley MD on 8/1/2024 7/30/2024 Initial Diagnosis    Malignant neoplasm of upper-outer quadrant of right breast in female, estrogen receptor positive          Interval History: Since her last visit she has completed radiation therapy, is on AI under the care of Dr. Vasquez.  She reports she completed her screening mammogram approximately 1 week prior to this appointment.  No significant joint pains.  Has noted some hair loss.  Some fatigue that seems to be overall improving as she returns to more of her usual activities including daily walks with her .    Past Medical History:   Diagnosis Date   • Allergic rhinitis    • Anxiety    • Breast cancer, right    • Colon polyp 10/31/2017    , ascending colon tubular adenoma   • Hemorrhoids    • History of bone density study 08/2011    nl   • History of bone density study     nl 06/05/2017   • History of mitral valve prolapse     she was told this in the psat, but she had a  normal echo in 2016   • Hyperlipidemia    • Hyperplastic colon polyp     04/12 hyperplastic rectal and colon polyps,      Patient Active Problem List   Diagnosis   • Malignant neoplasm of upper-outer quadrant of right breast in female, estrogen receptor positive   • Atypical lobular hyperplasia (ALH) of left breast     Current Outpatient Medications on File Prior to Visit   Medication Sig Dispense Refill   • anastrozole (ARIMIDEX) 1 MG tablet Take 1 tablet by mouth Daily. 90 tablet 3   • atorvastatin (LIPITOR) 20 MG tablet TAKE 1 TABLET DAILY 90 tablet 3   • busPIRone (BUSPAR) 5 MG tablet TAKE 1 TABLET THREE TIMES A  tablet 0   • Cetirizine HCl (ZYRTEC PO) Take 10 mg by mouth Daily.     • fluticasone (FLONASE) 50 MCG/ACT nasal spray 2 sprays by Each Nare route Daily. (Patient taking differently: 1 spray by Each Nare route Daily.) 16 mL 11     No current facility-administered medications on file prior to visit.     Allergies   Allergen Reactions   • Ciprofloxacin Other (See Comments)     UNSURE OF RX       Past Surgical History:   Procedure Laterality Date   • BREAST BIOPSY  08/2024   • BREAST LUMPECTOMY WITH SENTINEL NODE BIOPSY Bilateral 09/12/2024    Procedure: Right breast partial mastectomy, NACHO guided to 2 sites.  Right sentinel lymph node biopsy.  Left NACHO guided excisional biopsy;  Surgeon: Marilia Mosley MD;  Location: [facility];  Service: General;  Laterality: Bilateral;   • BREAST SURGERY Bilateral 09/12/2024    Procedure: RIGHT ONCOPLASTIC AND LEFT MASTOPEXY FOR SYMMETRY, BILATERAL REMOVAL OF BREAST IMPLANTS;  Surgeon: Wayne Sanchez MD;  Location: [facility];  Service: Plastics;  Laterality: Bilateral;   • COLONOSCOPY W/ POLYPECTOMY  10/31/2017    , ascending colon 3 mm 10/31/2017, sigmoid colon (3mm) - hyperplastic polyp  04/04/2012     Social History     Socioeconomic History   • Marital status:      Spouse name: Don   • Number of children: 3   Tobacco Use    • Smoking status: Never     Passive exposure: Never   • Smokeless tobacco: Never   Vaping Use   • Vaping status: Never Used   Substance and Sexual Activity   • Alcohol use: Yes     Alcohol/week: 7.0 standard drinks of alcohol     Types: 7 Glasses of wine per week     Comment: social   • Drug use: No   • Sexual activity: Yes     Partners: Male     Birth control/protection: Post-menopausal     Family History   Problem Relation Age of Onset   • Diabetes Father    • Heart disease Father    • Prostate cancer Father    • Macular degeneration Mother    • Hypertension Mother    • Dementia Mother    • Heart disease Paternal Grandfather    • Heart disease Maternal Grandmother    • Malig Hyperthermia Neg Hx    • Breast cancer Neg Hx    • Ovarian cancer Neg Hx    • Uterine cancer Neg Hx    • Colon cancer Neg Hx         Review of Systems:  CONSTITUTIONAL: No weight loss, fever, chills, weakness or fatigue.  HEENT: Eyes: No visual loss, blurred vision, double vision or yellow sclerae. Ears, Nose, Throat: No hearing loss, sneezing, congestion, runny nose or sore throat.  SKIN: No rash or itching.  CARDIOVASCULAR: No chest pain, chest pressure or chest discomfort. No palpitations or edema.  RESPIRATORY: No shortness of breath, cough or sputum.  GASTROINTESTINAL: No anorexia, nausea, vomiting or diarrhea. No abdominal pain or blood.  GENITOURINARY: No burning on urination  NEUROLOGICAL: No headache, dizziness, syncope, paralysis, ataxia, numbness or tingling in the extremities. No change in bowel or bladder control.  MUSCULOSKELETAL: No muscle, back pain, joint pain or stiffness.  HEMATOLOGIC: No anemia, bleeding or bruising.  LYMPHATICS: No enlarged nodes.  PSYCHIATRIC: No history of depression or anxiety.  ENDOCRINOLOGIC: No reports of sweating, cold or heat intolerance. No polyuria or polydipsia.  ALLERGIES: No history of asthma, hives, eczema or rhinitis.    Physical Exam:  Vitals:    04/23/25 1346   BP: 136/76   Pulse: 75    SpO2: 98%         General: Alert, cooperative    HEENT: Atraumatic, normocephalic    Oral/Maxillofacial: Moist mucous membranes    Neck: Supple     Lungs: EWOB, clear to auscultation bilaterally     Heart: RRR    Breast: Well-healed bilateral reduction incisions, bilateral breasts examined sitting and supine.  RIGHT-lateral skin thickening and postsurgery seroma with ongoing radiation changes no nipple discharge  LEFT- no masses, skin changes, or nipple discharge    Lymphatics:  Bilateral supraclavicular, cervical, and axillary basins without lymphadneopathy    Abdomen: Soft, non-tender, non-distended    Extremities: normal strength and sensation.  No obvious deformities.     Neuro: alert, normal speech, no focal findings or movement disorder noted     Skin: no lesions or abrasions      Recent Imagin25 Bilateral Screening Mammogram ([facility])  Scattered areas of fibroglandular density.  There is an area of architectural distortion and post surgical scar in the right breast - compatible with surgical and radiation history.  Left breast no suspicious masses, significant calcifications or other abnormalities are seen.  IMPRESSION  Post surgical scar in the right breast is benign negative   Follow up in 1 year.  BIRADS2      Assessment/Plan: Vertio العراقي is a 75 y.o.female with h/o Stage IA, multifocal T1c N0, right invasive lobular breast cancer, WILL. Surgery completed: 2024  - RTC 6 months for clinical breast exam  - Next imaging due 1 year, screening mammogram, will order at her follow-up appointment  - Follows with Dr. Sanchez for oncoplastic closure  - Lymphedema offered and recommended referral, patient declined states having no issues - massage and physical therapy would likely improve her symptoms of right breast thickening after surgery and radiation  - Discussed self breast massage for lymphatic changes after radiation and scar massage  - Continue follow-up with Dr. Vasquez for ROBERT Capellan  MD Dimitri  Breast Surgical Oncology  I spent 20 minutes caring for Ms. Giordano on this date of service. This time includes time spent by me in the following activities: preparing for the visit, reviewing tests, obtaining and/or reviewing a separately obtained history, performing a medically appropriate examination and/or evaluation , counseling and educating the patient/family/caregiver, ordering medications, tests, or procedures, referring and communicating with other health care professionals , documenting information in the medical record, independently interpreting results and communicating that information with the patient/family/caregiver, and care coordination.    Return in about 6 months (around 10/23/2025).

## 2025-05-07 ENCOUNTER — TELEPHONE (OUTPATIENT)
Dept: SURGERY | Facility: CLINIC | Age: 75
End: 2025-05-07
Payer: MEDICARE

## 2025-05-19 DIAGNOSIS — F41.8 ANXIETY ASSOCIATED WITH DEPRESSION: ICD-10-CM

## 2025-05-19 RX ORDER — BUSPIRONE HYDROCHLORIDE 5 MG/1
5 TABLET ORAL 3 TIMES DAILY
Qty: 90 TABLET | Refills: 0 | Status: SHIPPED | OUTPATIENT
Start: 2025-05-19

## 2025-05-19 NOTE — TELEPHONE ENCOUNTER
Rx Refill Note  Requested Prescriptions     Pending Prescriptions Disp Refills    busPIRone (BUSPAR) 5 MG tablet [Pharmacy Med Name: BUSPIRONE HCL TABS 5MG] 270 tablet 3     Sig: TAKE 1 TABLET THREE TIMES A DAY      Last office visit with prescribing clinician: 8/7/2023   Last telemedicine visit with prescribing clinician: Visit date not found   Next office visit with prescribing clinician: Visit date not found                         Would you like a call back once the refill request has been completed: [] Yes [] No    If the office needs to give you a call back, can they leave a voicemail: [] Yes [] No    Claude Ye MA  05/19/25, 07:30 EDT

## 2025-05-23 DIAGNOSIS — E78.00 PURE HYPERCHOLESTEROLEMIA: ICD-10-CM

## 2025-05-23 RX ORDER — ATORVASTATIN CALCIUM 20 MG/1
20 TABLET, FILM COATED ORAL DAILY
Qty: 30 TABLET | Refills: 0 | Status: SHIPPED | OUTPATIENT
Start: 2025-05-23

## 2025-05-23 NOTE — TELEPHONE ENCOUNTER
Caller: Verito العراقي    Relationship: Self    Best call back number:[phone]      Requested Prescriptions:   Requested Prescriptions     Pending Prescriptions Disp Refills    atorvastatin (LIPITOR) 20 MG tablet 90 tablet 3     Sig: Take 1 tablet by mouth Daily.        Pharmacy where request should be sent: [facility] - [address] [phone] [facility] [phone]      Last office visit with prescribing clinician: 8/7/2023   Last telemedicine visit with prescribing clinician: Visit date not found   Next office visit with prescribing clinician: 6/9/2025     Additional details provided by patient: 10 DAYS SUPPLY    Does the patient have less than a 3 day supply:  [] Yes  [x] No    Would you like a call back once the refill request has been completed: [] Yes [x] No    If the office needs to give you a call back, can they leave a voicemail: [] Yes [x] No    Tom Rdz Rep   05/23/25 14:12 EDT

## 2025-05-23 NOTE — TELEPHONE ENCOUNTER
Rx Refill Note  Requested Prescriptions     Pending Prescriptions Disp Refills    atorvastatin (LIPITOR) 20 MG tablet 90 tablet 3     Sig: Take 1 tablet by mouth Daily.      Last office visit with prescribing clinician: 8/7/2023   Last telemedicine visit with prescribing clinician: Visit date not found   Next office visit with prescribing clinician: 6/9/2025                         Would you like a call back once the refill request has been completed: [] Yes [] No    If the office needs to give you a call back, can they leave a voicemail: [] Yes [] No    Claude Ye MA  05/23/25, 14:14 EDT

## 2025-05-28 DIAGNOSIS — Z13.220 SCREENING FOR LIPID DISORDERS: ICD-10-CM

## 2025-05-28 DIAGNOSIS — E78.5 HYPERLIPIDEMIA, UNSPECIFIED HYPERLIPIDEMIA TYPE: ICD-10-CM

## 2025-05-28 DIAGNOSIS — Z13.0 SCREENING FOR DEFICIENCY ANEMIA: ICD-10-CM

## 2025-05-28 DIAGNOSIS — Z13.1 SCREENING FOR DIABETES MELLITUS: ICD-10-CM

## 2025-05-28 DIAGNOSIS — Z13.0 SCREENING FOR ENDOCRINE, METABOLIC AND IMMUNITY DISORDER: ICD-10-CM

## 2025-05-28 DIAGNOSIS — E78.00 PURE HYPERCHOLESTEROLEMIA: Primary | ICD-10-CM

## 2025-05-28 DIAGNOSIS — Z13.29 SCREENING FOR ENDOCRINE, METABOLIC AND IMMUNITY DISORDER: ICD-10-CM

## 2025-05-28 DIAGNOSIS — Z13.228 SCREENING FOR ENDOCRINE, METABOLIC AND IMMUNITY DISORDER: ICD-10-CM

## 2025-05-28 DIAGNOSIS — E55.9 VITAMIN D DEFICIENCY: ICD-10-CM

## 2025-06-09 ENCOUNTER — OFFICE VISIT (OUTPATIENT)
Dept: FAMILY MEDICINE CLINIC | Facility: CLINIC | Age: 75
End: 2025-06-09
Payer: MEDICARE

## 2025-06-09 VITALS
HEIGHT: 62 IN | WEIGHT: 181 LBS | BODY MASS INDEX: 33.31 KG/M2 | SYSTOLIC BLOOD PRESSURE: 134 MMHG | TEMPERATURE: 98.1 F | OXYGEN SATURATION: 97 % | HEART RATE: 102 BPM | DIASTOLIC BLOOD PRESSURE: 82 MMHG

## 2025-06-09 DIAGNOSIS — Z00.00 MEDICARE ANNUAL WELLNESS VISIT, SUBSEQUENT: Primary | ICD-10-CM

## 2025-06-09 DIAGNOSIS — E78.00 PURE HYPERCHOLESTEROLEMIA: ICD-10-CM

## 2025-06-09 PROCEDURE — 1159F MED LIST DOCD IN RCRD: CPT | Performed by: NURSE PRACTITIONER

## 2025-06-09 PROCEDURE — 1160F RVW MEDS BY RX/DR IN RCRD: CPT | Performed by: NURSE PRACTITIONER

## 2025-06-09 PROCEDURE — G0439 PPPS, SUBSEQ VISIT: HCPCS | Performed by: NURSE PRACTITIONER

## 2025-06-09 PROCEDURE — 1126F AMNT PAIN NOTED NONE PRSNT: CPT | Performed by: NURSE PRACTITIONER

## 2025-06-09 RX ORDER — ATORVASTATIN CALCIUM 20 MG/1
20 TABLET, FILM COATED ORAL DAILY
Qty: 90 TABLET | Refills: 1 | Status: SHIPPED | OUTPATIENT
Start: 2025-06-09

## 2025-06-09 NOTE — PROGRESS NOTES
Subjective   The ABCs of the Annual Wellness Visit  Medicare Wellness Visit      Verito العراقي is a 75 y.o. patient who presents for a Medicare Wellness Visit. She has a history of anxiety, hyperlipidemia, and breast cancer.  Following with oncology and breast surgery.  She had a right breast partial mastectomy in September 2024.  She is feeling well today.    The following portions of the patient's history were reviewed and   updated as appropriate: allergies, current medications, past family history, past medical history, past social history, past surgical history, and problem list.    Compared to one year ago, the patient's physical   health is the same.  Compared to one year ago, the patient's mental   health is the same.    Recent Hospitalizations:  She was not admitted to the hospital during the last year.     Current Medical Providers:  Patient Care Team:  Christa Herr APRN as PCP - General (Nurse Practitioner)  Gabrilela Alvarado MD as Consulting Physician (Radiation Oncology)  Marilia Mosley MD as Surgeon (Breast Surgery)  Marilia Mosley MD as Referring Physician (Breast Surgery)  Esperanza Vasquez MD as Consulting Physician (Hematology and Oncology)  Elisabeth Yap MD as Gynecologist (Obstetrics and Gynecology)    Outpatient Medications Prior to Visit   Medication Sig Dispense Refill    anastrozole (ARIMIDEX) 1 MG tablet Take 1 tablet by mouth Daily. 90 tablet 3    busPIRone (BUSPAR) 5 MG tablet Take 1 tablet by mouth 3 (Three) Times a Day. Please call to schedule appt for further refills 90 tablet 0    Cetirizine HCl (ZYRTEC PO) Take 10 mg by mouth Daily.      fluticasone (FLONASE) 50 MCG/ACT nasal spray 2 sprays by Each Nare route Daily. (Patient taking differently: 1 spray by Each Nare route Daily.) 16 mL 11    atorvastatin (LIPITOR) 20 MG tablet Take 1 tablet by mouth Daily. 30 tablet 0     No facility-administered medications prior to visit.     No opioid medication identified on active  "medication list. I have reviewed chart for other potential  high risk medication/s and harmful drug interactions in the elderly.      Aspirin is not on active medication list.  Aspirin use is not indicated based on review of current medical condition/s. Risk of harm outweighs potential benefits.  .    Patient Active Problem List   Diagnosis    Malignant neoplasm of upper-outer quadrant of right breast in female, estrogen receptor positive    Atypical lobular hyperplasia (ALH) of left breast     Advance Care Planning Advance Directive is not on file.  ACP discussion was held with the patient during this visit. Patient has an advance directive (not in EMR), copy requested.            Objective   Vitals:    06/09/25 1238   BP: 134/82   Pulse: 102   Temp: 98.1 °F (36.7 °C)   SpO2: 97%   Weight: 82.1 kg (181 lb)   Height: 158.1 cm (62.24\")   PainSc: 0-No pain       Estimated body mass index is 32.85 kg/m² as calculated from the following:    Height as of this encounter: 158.1 cm (62.24\").    Weight as of this encounter: 82.1 kg (181 lb).                Does the patient have evidence of cognitive impairment? No  Lab Results   Component Value Date    CHLPL 173 06/03/2025    TRIG 75 06/03/2025    HDL 67 (H) 06/03/2025    LDL 92 06/03/2025    VLDL 14 06/03/2025    HGBA1C 5.30 06/03/2025                                                                                                Health  Risk Assessment    Smoking Status:  Social History     Tobacco Use   Smoking Status Never    Passive exposure: Never   Smokeless Tobacco Never     Alcohol Consumption:  Social History     Substance and Sexual Activity   Alcohol Use Yes    Alcohol/week: 7.0 standard drinks of alcohol    Types: 7 Glasses of wine per week    Comment: social       Fall Risk Screen  STEADI Fall Risk Assessment was completed, and patient is at LOW risk for falls.Assessment completed on:6/9/2025    Depression Screening   Little interest or pleasure in doing things? " Not at all   Feeling down, depressed, or hopeless? Not at all   PHQ-2 Total Score 0      Health Habits and Functional and Cognitive Screenin/2/2025     3:48 PM   Functional & Cognitive Status   Do you have difficulty preparing food and eating? No   Do you have difficulty bathing yourself, getting dressed or grooming yourself? No   Do you have difficulty using the toilet? No   Do you have difficulty moving around from place to place? No   Do you have trouble with steps or getting out of a bed or a chair? No   Current Diet Well Balanced Diet   Dental Exam Up to date   Eye Exam Up to date   Exercise (times per week) 3 times per week   Current Exercises Include Walking;Yard Work   Do you need help using the phone?  No   Are you deaf or do you have serious difficulty hearing?  No   Do you need help to go to places out of walking distance? No   Do you need help shopping? No   Do you need help preparing meals?  No   Do you need help with housework?  No   Do you need help with laundry? No   Do you need help taking your medications? No   Do you need help managing money? No   Do you ever drive or ride in a car without wearing a seat belt? No   Have you felt unusual stress, anger or loneliness in the last month? No   Who do you live with? Spouse   If you need help, do you have trouble finding someone available to you? No   Have you been bothered in the last four weeks by sexual problems? No   Do you have difficulty concentrating, remembering or making decisions? No           Age-appropriate Screening Schedule:  Refer to the list below for future screening recommendations based on patient's age, sex and/or medical conditions. Orders for these recommended tests are listed in the plan section. The patient has been provided with a written plan.    Health Maintenance List  Health Maintenance   Topic Date Due    COVID-19 Vaccine (8 - Pfizer risk - season) 2025 (Originally 2025)    Pneumococcal Vaccine 50+ (2  "of 2 - PCV) 06/23/2025 (Originally 11/18/2021)    TDAP/TD VACCINES (2 - Td or Tdap) 06/23/2025 (Originally 12/15/2024)    INFLUENZA VACCINE  07/01/2025    LIPID PANEL  06/03/2026    ANNUAL WELLNESS VISIT  06/09/2026    COLORECTAL CANCER SCREENING  11/08/2027    DXA SCAN  10/28/2029    RSV Vaccine - Adults  Completed    ZOSTER VACCINE  Addressed    HEPATITIS C SCREENING  Discontinued    MAMMOGRAM  Discontinued                                                                                                                                                CMS Preventative Services Quick Reference  Risk Factors Identified During Encounter  Immunizations Discussed/Encouraged: Tdap and Prevnar 20 (Pneumococcal 20-valent conjugate)    Patient is a pleasant 75-year-old female who presents for her Medicare wellness visit.  Vital signs are within normal limits.  Reviewed lab work today.  Anxiety well-controlled.    Health maintenance screenings:  Breast cancer screening: Following with oncology and breast surgery.  Diagnostic mammogram performed July 2024.  Osteoporosis screening: DEXA scan performed October 2024.  Normal bone density.  Colon cancer screening: Colonoscopy performed November 2022.  Immunizations: Due for penumonia vaccine and TDAP.     The above risks/problems have been discussed with the patient.  Pertinent information has been shared with the patient in the After Visit Summary.  An After Visit Summary and PPPS were made available to the patient.    Follow Up:   Next Medicare Wellness visit to be scheduled in 1 year.         Objective   Vital Signs:  /82   Pulse 102   Temp 98.1 °F (36.7 °C)   Ht 158.1 cm (62.24\")   Wt 82.1 kg (181 lb)   SpO2 97%   BMI 32.85 kg/m²   Physical Exam  Vitals reviewed.   Constitutional:       General: She is not in acute distress.     Appearance: Normal appearance. She is not ill-appearing, toxic-appearing or diaphoretic.   Cardiovascular:      Rate and Rhythm: Normal rate " and regular rhythm.   Neurological:      General: No focal deficit present.      Mental Status: She is alert and oriented to person, place, and time.      Motor: No weakness.      Gait: Gait normal.   Psychiatric:         Mood and Affect: Mood normal.         Behavior: Behavior normal.             Common labs          9/4/2024    06:37 10/15/2024    09:31 6/3/2025    10:20   Common Labs   Glucose 107   105    BUN 9   14.0    Creatinine 0.72   0.85    Sodium 138   137    Potassium 4.0   4.3    Chloride 103   100    Calcium 9.6   9.7    Albumin   4.4    Total Bilirubin   0.6    Alkaline Phosphatase   83    AST (SGOT)   25    ALT (SGPT)   26    WBC 6.90  7.88  5.98    Hemoglobin 14.1  14.6  14.7    Hematocrit 41.9  45.6  44.1    Platelets 264  250  261    Total Cholesterol   173    Triglycerides   75    HDL Cholesterol   67    LDL Cholesterol    92    Hemoglobin A1C   5.30           Assessment and Plan      Pure hypercholesterolemia       Orders:    atorvastatin (LIPITOR) 20 MG tablet; Take 1 tablet by mouth Daily.    Medicare annual wellness visit, subsequent                 Follow Up   Return in about 6 months (around 12/9/2025) for Recheck.  Patient was given instructions and counseling regarding her condition or for health maintenance advice. Please see specific information pulled into the AVS if appropriate.    Electronically signed by MICHELLE Hidalgo, 06/09/25, 1:06 PM EDT.

## 2025-06-26 ENCOUNTER — OFFICE VISIT (OUTPATIENT)
Dept: ONCOLOGY | Facility: CLINIC | Age: 75
End: 2025-06-26
Payer: MEDICARE

## 2025-06-26 VITALS
TEMPERATURE: 98.4 F | DIASTOLIC BLOOD PRESSURE: 83 MMHG | WEIGHT: 180.8 LBS | OXYGEN SATURATION: 95 % | HEART RATE: 90 BPM | SYSTOLIC BLOOD PRESSURE: 130 MMHG | HEIGHT: 62 IN | BODY MASS INDEX: 33.27 KG/M2

## 2025-06-26 DIAGNOSIS — Z17.0 MALIGNANT NEOPLASM OF UPPER-OUTER QUADRANT OF RIGHT BREAST IN FEMALE, ESTROGEN RECEPTOR POSITIVE: Primary | ICD-10-CM

## 2025-06-26 DIAGNOSIS — C50.411 MALIGNANT NEOPLASM OF UPPER-OUTER QUADRANT OF RIGHT BREAST IN FEMALE, ESTROGEN RECEPTOR POSITIVE: Primary | ICD-10-CM

## 2025-06-26 DIAGNOSIS — Z79.811 AROMATASE INHIBITOR USE: ICD-10-CM

## 2025-06-26 RX ORDER — ANASTROZOLE 1 MG/1
1 TABLET ORAL DAILY
Qty: 90 TABLET | Refills: 3 | Status: SHIPPED | OUTPATIENT
Start: 2025-06-26

## 2025-06-26 NOTE — PROGRESS NOTES
Subjective   Verito العراقي is a 75 y.o. female.  Referred by Dr. Mosley for right breast invasive ductal carcinoma    History of Present Illness     Ms. 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  Patient had bilateral prepectoral saline implants and a few punctate calcifications seen in the anterior one third of the left breast.  3 o'clock position.    Left breast diagnostic mammogram 3/20/2024-amorphus calcifications with grouped distribution in the anterior one third region of the left breast at 3:00.    4/15/2024-left breast 3:00 stereotactic biopsy-atypical lobular hyperplasia    6/10/2024-bilateral breast MRI-no suspicious enhancement in the left breast at the 2 o'clock position at the site of atypical lobular hyperplasia.  Suspicious 1.2 cm enhancing mass at 9 o'clock position of the right breast with a possible mammographic correlate and suspicious 1 cm enhancing mass at 8:00 of the right breast.  Further evaluation with diagnostic mammogram and ultrasound and possible stereotactic or ultrasound-guided biopsy recommended.    Right breast diagnostic mammogram and ultrasound 7/9/2024-More anterior 1.2 cm mass in the posterior depth of the outer right breast definitely seen on the implant displaced extended cc view.  Masses are otherwise likely obscured by the breast implant.  Ultrasound showed a 9 o'clock position right breast mass, 8 cm from the nipple measuring 1.2 x 0.9 x 3.5 cm.  At the 9:30 position, 9 cm from the nipple there is a 0.6 x 0.6 time 0.5 cm mass.  Impression-2 small masses seen on the prior MRI study in the lateral right breast superficial to the breast implant are visualized on the ultrasound.  Ultrasound-guided biopsy recommended.    7/25/2024-right breast 10:00 ultrasound-guided biopsy-invasive ductal carcinoma, grade 2 7 mm, no DCIS or lymphovascular space invasion.  ER +91 to 100%, TN +41 to  50%, HER2 negative, Ki-67 40%    Right breast 9:30 position ultrasound-guided biopsy-invasive ductal carcinoma, grade 2, 8 mm, rare focus of DCIS, no lymphovascular space invasion  ER +91 to 100%, VT +91 to 100%, HER2 negative, Ki-67 15%    9/12/2024-left breast excisional biopsy and right breast lumpectomy and right sentinel lymph node biopsy  1.left breast excisional biopsy-radial scar, focal fibroadenomatous hyperplasia, fibrocystic change with clustered ductal and apocrine cyst.  No evidence of malignancy.    2.right breast partial mastectomy-multifocal invasive ductal carcinoma  1A-invasive ductal carcinoma measuring 11 x 10 x 5 mm, grade 2, no lymphovascular space invasion, focal associated low-grade DCIS    2A-invasive ductal carcinoma measuring 12 x 11 x 10 mm, grade 2  No lymphovascular space invasion, no definite associated DCIS    The tumors are ER/VT strongly positive and HER2 1+ on immunohistochemistry.    Oncotype DX recurrence score on the 12 mm tumor returned at 12 with a less than 1% benefit from chemotherapy and 3% risk of distant metastasis at 9 years.    Patient denies any family history of breast cancer.    Comorbidities include hyperlipidemia and anxiety.    Completed adjuvant radiation to the right breast in December 2024.    Started anastrozole in December 2024.    Patient reports tolerating anastrozole well.  She denies any new complaints at this time.  In the right breast at the site of surgery there is some scar tissue but other than that no other new palpable abnormalities.  Dr. Mosley has ordered a screening mammogram and she is scheduled to see Dr. Mosley in March 2025.    DEXA scan performed October 2024 shows normal bone density    INTERVAL HISTORY:  The patient is a 75 y.o. female with the above-mentioned history, returns to the office today for 4-month follow-up and review.  She continues on anastrozole 1 mg daily with overall excellent tolerance.  She has no new hot flashes, joint  pain.  Cramps.  She has no new pain, shortness of breath or abdominal pain.    The following portions of the patient's history were reviewed and updated as appropriate: allergies, current medications, past family history, past medical history, past social history, past surgical history, and problem list.    Past Medical History:   Diagnosis Date    Allergic rhinitis     Anxiety     Breast cancer, right     Colon polyp 10/31/2017    , ascending colon tubular adenoma    Hemorrhoids     History of bone density study 08/2011    nl    History of bone density study     nl 06/05/2017    History of mitral valve prolapse     she was told this in the psat, but she had a normal echo in 2016    Hyperlipidemia     Hyperplastic colon polyp     04/12 hyperplastic rectal and colon polyps,         Past Surgical History:   Procedure Laterality Date    BREAST BIOPSY  08/2024    BREAST LUMPECTOMY WITH SENTINEL NODE BIOPSY Bilateral 09/12/2024    Procedure: Right breast partial mastectomy, NACHO guided to 2 sites.  Right sentinel lymph node biopsy.  Left NACHO guided excisional biopsy;  Surgeon: Marilia Mosley MD;  Location: [facility];  Service: General;  Laterality: Bilateral;    BREAST SURGERY Bilateral 09/12/2024    Procedure: RIGHT ONCOPLASTIC AND LEFT MASTOPEXY FOR SYMMETRY, BILATERAL REMOVAL OF BREAST IMPLANTS;  Surgeon: Wayne Sanchez MD;  Location: [facility] OR;  Service: Plastics;  Laterality: Bilateral;    COLONOSCOPY  8/2018    COLONOSCOPY W/ POLYPECTOMY  10/31/2017    , ascending colon 3 mm 10/31/2017, sigmoid colon (3mm) - hyperplastic polyp  04/04/2012        Family History   Problem Relation Age of Onset    Diabetes Father     Heart disease Father     Prostate cancer Father     Macular degeneration Mother     Hypertension Mother     Dementia Mother     Heart disease Paternal Grandfather     Heart disease Maternal Grandmother     Malig Hyperthermia Neg Hx     Breast cancer Neg Hx     Ovarian  "cancer Neg Hx     Uterine cancer Neg Hx     Colon cancer Neg Hx         Social History     Socioeconomic History    Marital status:      Spouse name: Don    Number of children: 3   Tobacco Use    Smoking status: Never     Passive exposure: Never    Smokeless tobacco: Never   Vaping Use    Vaping status: Never Used   Substance and Sexual Activity    Alcohol use: Yes     Alcohol/week: 7.0 standard drinks of alcohol     Types: 7 Glasses of wine per week     Comment: social    Drug use: No    Sexual activity: Yes     Partners: Male     Birth control/protection: Post-menopausal        OB History          3    Para   3    Term   3            AB        Living   3         SAB        IAB        Ectopic        Molar        Multiple        Live Births   3            Age at menarche-12  Age at first live childbirth-20   3 para 3  0  Breast-feeding-1 month  Age at menopause-40s  No use of hormone replacement therapy.    Allergies   Allergen Reactions    Ciprofloxacin Other (See Comments)     UNSURE OF RX      Review of Systems  ROS as per HPI    Objective   Blood pressure 130/83, pulse 90, temperature 98.4 °F (36.9 °C), temperature source Oral, height 158.1 cm (62.24\"), weight 82 kg (180 lb 12.8 oz), SpO2 95%, not currently breastfeeding.   Physical Exam  Vitals reviewed.   Constitutional:       Appearance: Normal appearance. She is normal weight.   HENT:      Mouth/Throat:      Pharynx: Oropharynx is clear.   Eyes:      Conjunctiva/sclera: Conjunctivae normal.   Cardiovascular:      Rate and Rhythm: Normal rate.   Pulmonary:      Effort: Pulmonary effort is normal.   Musculoskeletal:         General: Normal range of motion.      Cervical back: Normal range of motion.   Neurological:      General: No focal deficit present.      Mental Status: She is alert.   Psychiatric:         Mood and Affect: Mood normal.         Behavior: Behavior normal.         Thought Content: Thought content normal.    "      Judgment: Judgment normal.     Exam: Left breast status post mastopexy with no palpable abnormalities, no skin changes or nipple discharge.  Right breast with outer skin thickening and radiation changes with scar tissue noted.  Patient reports this is unchanged from her baseline    Results Encounter on 06/03/2025   Component Date Value Ref Range Status    WBC 06/03/2025 5.98  3.40 - 10.80 10*3/mm3 Final    RBC 06/03/2025 4.77  3.77 - 5.28 10*6/mm3 Final    Hemoglobin 06/03/2025 14.7  12.0 - 15.9 g/dL Final    Hematocrit 06/03/2025 44.1  34.0 - 46.6 % Final    MCV 06/03/2025 92.5  79.0 - 97.0 fL Final    MCH 06/03/2025 30.8  26.6 - 33.0 pg Final    MCHC 06/03/2025 33.3  31.5 - 35.7 g/dL Final    RDW 06/03/2025 12.8  12.3 - 15.4 % Final    Platelets 06/03/2025 261  140 - 450 10*3/mm3 Final    Neutrophil Rel % 06/03/2025 61.0  42.7 - 76.0 % Final    Lymphocyte Rel % 06/03/2025 28.3  19.6 - 45.3 % Final    Monocyte Rel % 06/03/2025 7.9  5.0 - 12.0 % Final    Eosinophil Rel % 06/03/2025 1.8  0.3 - 6.2 % Final    Basophil Rel % 06/03/2025 0.8  0.0 - 1.5 % Final    Neutrophils Absolute 06/03/2025 3.65  1.70 - 7.00 10*3/mm3 Final    Lymphocytes Absolute 06/03/2025 1.69  0.70 - 3.10 10*3/mm3 Final    Monocytes Absolute 06/03/2025 0.47  0.10 - 0.90 10*3/mm3 Final    Eosinophils Absolute 06/03/2025 0.11  0.00 - 0.40 10*3/mm3 Final    Basophils Absolute 06/03/2025 0.05  0.00 - 0.20 10*3/mm3 Final    Immature Granulocyte Rel % 06/03/2025 0.2  0.0 - 0.5 % Final    Immature Grans Absolute 06/03/2025 0.01  0.00 - 0.05 10*3/mm3 Final    nRBC 06/03/2025 0.0  0.0 - 0.2 /100 WBC Final    Glucose 06/03/2025 105 (H)  65 - 99 mg/dL Final    BUN 06/03/2025 14.0  8.0 - 23.0 mg/dL Final    Creatinine 06/03/2025 0.85  0.57 - 1.00 mg/dL Final    EGFR Result 06/03/2025 71.5  >60.0 mL/min/1.73 Final    Comment: GFR Categories in Chronic Kidney Disease (CKD)  GFR Category          GFR (mL/min/1.73)    Interpretation  G1                    90  or greater        Normal or high  (1)  G2                    60-89                Mild decrease  (1)  G3a                   45-59                Mild to moderate  decrease  G3b                   30-44                Moderate to  severe decrease  G4                    15-29                Severe decrease  G5                    14 or less           Kidney failure  (1)In the absence of evidence of kidney disease, neither  GFR category G1 or G2 fulfill the criteria for CKD.  eGFR calculation 2021 CKD-EPI creatinine equation, which  does not include race as a factor      BUN/Creatinine Ratio 06/03/2025 16.5  7.0 - 25.0 Final    Sodium 06/03/2025 137  136 - 145 mmol/L Final    Potassium 06/03/2025 4.3  3.5 - 5.2 mmol/L Final    Chloride 06/03/2025 100  98 - 107 mmol/L Final    Total CO2 06/03/2025 22.4  22.0 - 29.0 mmol/L Final    Calcium 06/03/2025 9.7  8.6 - 10.5 mg/dL Final    Total Protein 06/03/2025 7.0  6.0 - 8.5 g/dL Final    Albumin 06/03/2025 4.4  3.5 - 5.2 g/dL Final    Globulin 06/03/2025 2.6  gm/dL Final    A/G Ratio 06/03/2025 1.7  g/dL Final    Total Bilirubin 06/03/2025 0.6  0.0 - 1.2 mg/dL Final    Alkaline Phosphatase 06/03/2025 83  39 - 117 U/L Final    AST (SGOT) 06/03/2025 25  1 - 32 U/L Final    ALT (SGPT) 06/03/2025 26  1 - 33 U/L Final    Total Cholesterol 06/03/2025 173  0 - 200 mg/dL Final    Comment: Cholesterol Reference Ranges  (U.S. Department of Health and Human Services ATP III  Classifications)  Desirable          <200 mg/dL  Borderline High    200-239 mg/dL  High Risk          >240 mg/dL  Triglyceride Reference Ranges  (U.S. Department of Health and Human Services ATP III  Classifications)  Normal           <150 mg/dL  Borderline High  150-199 mg/dL  High             200-499 mg/dL  Very High        >500 mg/dL  HDL Reference Ranges  (U.S. Department of Health and Human Services ATP III  Classifications)  Low     <40 mg/dl (major risk factor for CHD)  High    >60 mg/dl ('negative' risk  factor for CHD)  LDL Reference Ranges  (U.S. Department of Health and Human Services ATP III  Classifications)  Optimal          <100 mg/dL  Near Optimal     100-129 mg/dL  Borderline High  130-159 mg/dL  High             160-189 mg/dL  Very High        >189 mg/dL  LDL is calculated using the NIH LDL-C calculation.      Triglycerides 06/03/2025 75  0 - 150 mg/dL Final    HDL Cholesterol 06/03/2025 67 (H)  40 - 60 mg/dL Final    VLDL Cholesterol Romel 06/03/2025 14  5 - 40 mg/dL Final    LDL Chol Calc (NIH) 06/03/2025 92  0 - 100 mg/dL Final    Hemoglobin A1C 06/03/2025 5.30  4.80 - 5.60 % Final    Comment: Hemoglobin A1C Ranges:  Increased Risk for Diabetes  5.7% to 6.4%  Diabetes                     >= 6.5%  Diabetic Goal                < 7.0%      25 Hydroxy, Vitamin D 06/03/2025 50.9  30.0 - 100.0 ng/ml Final    Comment: Reference Range for Total Vitamin D 25(OH)  Deficiency <20.0 ng/mL  Insufficiency 21-29 ng/mL  Sufficiency  ng/mL  Toxicity >100 ng/ml          No radiology results for the last 30 days.       Assessment & Plan     *Right breast invasive ductal carcinoma  2 foci of invasive ductal carcinoma, 1 focus measures 12 mm in second focus measures 11 mm, both are grade 2, ER/NC strongly positive  Status post right breast partial mastectomy on 9/12/2024  Oncotype DX recurrence score of the 12 mm tumor returned 12 with no additional benefit from chemotherapy and 3% risk of distant metastasis.  Patient has met with Dr. Gabriella Hartman and plans to initiate radiation in December.  She is going on trip to [place] in November and hence wants to wait on the radiation till she returns.  We discussed the fact that there are 2 tumors and we only have Oncotype DX recurrence score on one of them.  Recommended that she proceed with Oncotype DX recurrence score on the second tumor however patient expressed that she does not wish to proceed with any chemotherapy even if the Oncotype DX recurrence score is  high.  Due to this reason we will not obtain the second Oncotype DX recurrence score.  After completion of radiation recommend that she proceed with endocrine therapy.  Radiation completed 12/31/2024, 3990 cGy in 15 fractions to the right breast  Anastrozole started December 2025  Continue at least 5 years of endocrine therapy and will discuss obtaining BCI at the end of 5 years to decide on extended endocrine therapy.  4/16/2025 screening mammography with no abnormalities, recommendations for annual follow-up  Patient evaluated 6/26/2025 with excellent tolerance to anastrozole which will be continued.    *Left breast atypical lobular hyperplasia-patient will be started on endocrine therapy after completion of radiation.    *Bone health  10/28/2024-DEXA scan shows normal bone density  Continue daily vitamin D    *Hyperlipidemia-continue atorvastatin    *Anxiety-continue BuSpar  Anxiety well-controlled at this time.    PLAN:  Continue anastrozole 1 mg daily  Continue daily vitamin D  Screening mammogram due April 2026  DEXA scan due October 2026  Return in 4 months for follow-up with Dr. Christina Shaw, MICHELLE  06/26/2025

## 2025-06-27 ENCOUNTER — TELEPHONE (OUTPATIENT)
Dept: ONCOLOGY | Facility: CLINIC | Age: 75
End: 2025-06-27
Payer: MEDICARE

## 2025-06-27 NOTE — TELEPHONE ENCOUNTER
Caller: Verito العراقي    Relationship to patient: Self    Best call back number: [phone]      Chief complaint: WILL BE OUT OF TOWN     Type of visit: VITALS ONLY AND FOLLOW UP 1    Requested date: NEEDING EITHER LAST WEEK IN OCTOBER OR FIRST WEEK OF DECEMBER      PREFER APPT TIME IN AFTERNOON IF CAN     If rescheduling, when is the original appointment: 11/0

## 2025-08-18 DIAGNOSIS — F41.8 ANXIETY ASSOCIATED WITH DEPRESSION: ICD-10-CM

## 2025-08-18 RX ORDER — BUSPIRONE HYDROCHLORIDE 5 MG/1
5 TABLET ORAL 3 TIMES DAILY
Qty: 270 TABLET | Refills: 1 | Status: SHIPPED | OUTPATIENT
Start: 2025-08-18

## (undated) DEVICE — SUT PDS 2/0 CT1 27IN DYED Z339H

## (undated) DEVICE — ELECTRD BLD EZ CLN MOD 2.5IN

## (undated) DEVICE — CVR TRANSD CIV FLX TPR 11.9 TO 3.8X61CM

## (undated) DEVICE — INTENDED FOR TISSUE SEPARATION, AND OTHER PROCEDURES THAT REQUIRE A SHARP SURGICAL BLADE TO PUNCTURE OR CUT.: Brand: BARD-PARKER ® STAINLESS STEEL BLADES

## (undated) DEVICE — ANTIBACTERIAL UNDYED BRAIDED (POLYGLACTIN 910), SYNTHETIC ABSORBABLE SUTURE: Brand: COATED VICRYL

## (undated) DEVICE — APPL CHLORAPREP HI/LITE 26ML ORNG

## (undated) DEVICE — SYR LUERLOK 5CC

## (undated) DEVICE — DRSNG SURESITE WNDW 4X4.5

## (undated) DEVICE — CONN TBG Y 5 IN 1 LF STRL

## (undated) DEVICE — Device

## (undated) DEVICE — HYPODERMIC SAFETY NEEDLE: Brand: MONOJECT

## (undated) DEVICE — DRAPE,TOWEL,LARGE,INVISISHIELD: Brand: MEDLINE

## (undated) DEVICE — SUT MNCRYL 3/0 PS2 18IN MCP497G

## (undated) DEVICE — STPLR SKIN VISISTAT WD 35CT

## (undated) DEVICE — NEEDLE, QUINCKE 22GX3.5": Brand: MEDLINE INDUSTRIES, INC.

## (undated) DEVICE — STRIP CLS WND SUTURESTRIP/PLS 0.5X5IN TP1105

## (undated) DEVICE — SUT SILK 2/0 SH 30IN K833H

## (undated) DEVICE — KT INK TISS MARGINMARKER STD 6COLOR

## (undated) DEVICE — GLV SURG BIOGEL LTX PF 7

## (undated) DEVICE — JACKSON-PRATT 100CC BULB RESERVOIR: Brand: CARDINAL HEALTH

## (undated) DEVICE — GLV SURG SENSICARE PI PF LF 7 GRN STRL

## (undated) DEVICE — GLV SURG BIOGEL LTX PF 8 1/2

## (undated) DEVICE — SPNG LAP 18X18IN LF STRL PK/5

## (undated) DEVICE — PK UNIV COMPL 40

## (undated) DEVICE — SMOKE EVACUATION TUBING WITH 7/8 IN TO 1/4 IN REDUCER: Brand: BUFFALO FILTER

## (undated) DEVICE — PENCL SMOKE/EVAC MEGADYNE TELESCP 10FT

## (undated) DEVICE — Device: Brand: FABCO

## (undated) DEVICE — CONTAINER,SPECIMEN,OR STERILE,4OZ: Brand: MEDLINE

## (undated) DEVICE — LEGGINGS, PAIR, 31X48, STERILE: Brand: MEDLINE

## (undated) DEVICE — SUT MNCRYL PLS ANTIB UD 4/0 PS2 18IN

## (undated) DEVICE — BNDG,ELSTC,MATRIX,STRL,6"X5YD,LF,HOOK&LP: Brand: MEDLINE

## (undated) DEVICE — SHEATH GUIDE SCOUT SURG TPR 8.3TO3.2CM 264CM STRL

## (undated) DEVICE — NDL HYPO PRECISIONGLIDE REG 25G 1 1/2

## (undated) DEVICE — PATIENT RETURN ELECTRODE, SINGLE-USE, CONTACT QUALITY MONITORING, ADULT, WITH 9FT CORD, FOR PATIENTS WEIGING OVER 33LBS. (15KG): Brand: MEGADYNE

## (undated) DEVICE — MARKER,SKIN,WI/RULER AND LABELS: Brand: MEDLINE

## (undated) DEVICE — SUT ETHLN 3/0 PS1 18IN 1663H

## (undated) DEVICE — ELECTRD BLD EZ CLN MOD XLNG 2.75IN

## (undated) DEVICE — GLV SURG SENSICARE PI LF PF 7.5 GRN STRL

## (undated) DEVICE — TOWEL,OR,DSP,ST,BLUE,STD,4/PK,20PK/CS: Brand: MEDLINE

## (undated) DEVICE — SYR LL TP 10ML STRL

## (undated) DEVICE — LOU MINOR PROCEDURE: Brand: MEDLINE INDUSTRIES, INC.

## (undated) DEVICE — BANDAGE,GAUZE,BULKEE II,4.5"X4.1YD,STRL: Brand: MEDLINE